# Patient Record
Sex: FEMALE | Race: ASIAN | NOT HISPANIC OR LATINO | Employment: OTHER | URBAN - METROPOLITAN AREA
[De-identification: names, ages, dates, MRNs, and addresses within clinical notes are randomized per-mention and may not be internally consistent; named-entity substitution may affect disease eponyms.]

---

## 2017-08-02 PROBLEM — Q18.1 PREAURICULAR CYST: Status: ACTIVE | Noted: 2017-08-02

## 2017-08-08 RX ORDER — ACETAMINOPHEN 500 MG
1000 TABLET ORAL 3 TIMES DAILY
Status: ON HOLD | COMMUNITY
End: 2022-11-28

## 2017-08-09 ENCOUNTER — ANESTHESIA EVENT (OUTPATIENT)
Dept: SURGERY | Facility: CLINIC | Age: 82
End: 2017-08-09
Payer: MEDICARE

## 2017-08-09 ENCOUNTER — ANESTHESIA (OUTPATIENT)
Dept: SURGERY | Facility: CLINIC | Age: 82
End: 2017-08-09
Payer: MEDICARE

## 2017-08-09 ENCOUNTER — SURGERY (OUTPATIENT)
Age: 82
End: 2017-08-09

## 2017-08-09 ENCOUNTER — HOSPITAL ENCOUNTER (OUTPATIENT)
Facility: CLINIC | Age: 82
Discharge: HOME | End: 2017-08-09
Attending: OTOLARYNGOLOGY | Admitting: OTOLARYNGOLOGY
Payer: MEDICARE

## 2017-08-09 VITALS
HEART RATE: 61 BPM | DIASTOLIC BLOOD PRESSURE: 72 MMHG | OXYGEN SATURATION: 95 % | HEIGHT: 61 IN | SYSTOLIC BLOOD PRESSURE: 143 MMHG | BODY MASS INDEX: 21.88 KG/M2 | RESPIRATION RATE: 16 BRPM | WEIGHT: 115.9 LBS | TEMPERATURE: 96.5 F

## 2017-08-09 DIAGNOSIS — Q18.1 PREAURICULAR CYST: Primary | ICD-10-CM

## 2017-08-09 PROCEDURE — 37000008 ZZH ANESTHESIA TECHNICAL FEE, 1ST 30 MIN: Performed by: OTOLARYNGOLOGY

## 2017-08-09 PROCEDURE — 88305 TISSUE EXAM BY PATHOLOGIST: CPT | Performed by: OTOLARYNGOLOGY

## 2017-08-09 PROCEDURE — 25000128 H RX IP 250 OP 636: Performed by: NURSE ANESTHETIST, CERTIFIED REGISTERED

## 2017-08-09 PROCEDURE — 71000027 ZZH RECOVERY PHASE 2 EACH 15 MINS: Performed by: OTOLARYNGOLOGY

## 2017-08-09 PROCEDURE — 36000052 ZZH SURGERY LEVEL 2 EA 15 ADDTL MIN: Performed by: OTOLARYNGOLOGY

## 2017-08-09 PROCEDURE — 71000012 ZZH RECOVERY PHASE 1 LEVEL 1 FIRST HR: Performed by: OTOLARYNGOLOGY

## 2017-08-09 PROCEDURE — 36000050 ZZH SURGERY LEVEL 2 1ST 30 MIN: Performed by: OTOLARYNGOLOGY

## 2017-08-09 PROCEDURE — 27210794 ZZH OR GENERAL SUPPLY STERILE: Performed by: OTOLARYNGOLOGY

## 2017-08-09 PROCEDURE — 25000125 ZZHC RX 250: Performed by: OTOLARYNGOLOGY

## 2017-08-09 PROCEDURE — 88305 TISSUE EXAM BY PATHOLOGIST: CPT | Mod: 26 | Performed by: OTOLARYNGOLOGY

## 2017-08-09 PROCEDURE — 25000125 ZZHC RX 250: Performed by: NURSE ANESTHETIST, CERTIFIED REGISTERED

## 2017-08-09 PROCEDURE — 37000009 ZZH ANESTHESIA TECHNICAL FEE, EACH ADDTL 15 MIN: Performed by: OTOLARYNGOLOGY

## 2017-08-09 PROCEDURE — 40000170 ZZH STATISTIC PRE-PROCEDURE ASSESSMENT II: Performed by: OTOLARYNGOLOGY

## 2017-08-09 RX ORDER — LIDOCAINE HYDROCHLORIDE 20 MG/ML
INJECTION, SOLUTION INFILTRATION; PERINEURAL PRN
Status: DISCONTINUED | OUTPATIENT
Start: 2017-08-09 | End: 2017-08-09

## 2017-08-09 RX ORDER — FENTANYL CITRATE 50 UG/ML
25-50 INJECTION, SOLUTION INTRAMUSCULAR; INTRAVENOUS EVERY 5 MIN PRN
Status: DISCONTINUED | OUTPATIENT
Start: 2017-08-09 | End: 2017-08-09 | Stop reason: HOSPADM

## 2017-08-09 RX ORDER — IBUPROFEN 600 MG/1
600 TABLET, FILM COATED ORAL
Status: DISCONTINUED | OUTPATIENT
Start: 2017-08-09 | End: 2017-08-09 | Stop reason: HOSPADM

## 2017-08-09 RX ORDER — SODIUM CHLORIDE, SODIUM LACTATE, POTASSIUM CHLORIDE, CALCIUM CHLORIDE 600; 310; 30; 20 MG/100ML; MG/100ML; MG/100ML; MG/100ML
INJECTION, SOLUTION INTRAVENOUS CONTINUOUS
Status: DISCONTINUED | OUTPATIENT
Start: 2017-08-09 | End: 2017-08-09 | Stop reason: HOSPADM

## 2017-08-09 RX ORDER — MEPERIDINE HYDROCHLORIDE 25 MG/ML
12.5 INJECTION INTRAMUSCULAR; INTRAVENOUS; SUBCUTANEOUS
Status: DISCONTINUED | OUTPATIENT
Start: 2017-08-09 | End: 2017-08-09 | Stop reason: HOSPADM

## 2017-08-09 RX ORDER — HYDROCODONE BITARTRATE AND ACETAMINOPHEN 5; 325 MG/1; MG/1
1-2 TABLET ORAL
Status: DISCONTINUED | OUTPATIENT
Start: 2017-08-09 | End: 2017-08-09 | Stop reason: HOSPADM

## 2017-08-09 RX ORDER — ONDANSETRON 4 MG/1
4 TABLET, ORALLY DISINTEGRATING ORAL EVERY 30 MIN PRN
Status: DISCONTINUED | OUTPATIENT
Start: 2017-08-09 | End: 2017-08-09 | Stop reason: HOSPADM

## 2017-08-09 RX ORDER — ONDANSETRON 2 MG/ML
INJECTION INTRAMUSCULAR; INTRAVENOUS PRN
Status: DISCONTINUED | OUTPATIENT
Start: 2017-08-09 | End: 2017-08-09

## 2017-08-09 RX ORDER — FENTANYL CITRATE 50 UG/ML
INJECTION, SOLUTION INTRAMUSCULAR; INTRAVENOUS PRN
Status: DISCONTINUED | OUTPATIENT
Start: 2017-08-09 | End: 2017-08-09

## 2017-08-09 RX ORDER — PROPOFOL 10 MG/ML
INJECTION, EMULSION INTRAVENOUS CONTINUOUS PRN
Status: DISCONTINUED | OUTPATIENT
Start: 2017-08-09 | End: 2017-08-09

## 2017-08-09 RX ORDER — ONDANSETRON 2 MG/ML
4 INJECTION INTRAMUSCULAR; INTRAVENOUS EVERY 30 MIN PRN
Status: DISCONTINUED | OUTPATIENT
Start: 2017-08-09 | End: 2017-08-09 | Stop reason: HOSPADM

## 2017-08-09 RX ORDER — GINSENG 100 MG
CAPSULE ORAL
Status: DISCONTINUED
Start: 2017-08-09 | End: 2017-08-09 | Stop reason: HOSPADM

## 2017-08-09 RX ORDER — DEXAMETHASONE SODIUM PHOSPHATE 4 MG/ML
INJECTION, SOLUTION INTRA-ARTICULAR; INTRALESIONAL; INTRAMUSCULAR; INTRAVENOUS; SOFT TISSUE PRN
Status: DISCONTINUED | OUTPATIENT
Start: 2017-08-09 | End: 2017-08-09

## 2017-08-09 RX ORDER — HYDROCODONE BITARTRATE AND ACETAMINOPHEN 5; 325 MG/1; MG/1
1-2 TABLET ORAL EVERY 4 HOURS PRN
Qty: 20 TABLET | Refills: 0 | Status: ON HOLD | OUTPATIENT
Start: 2017-08-09 | End: 2018-09-02

## 2017-08-09 RX ORDER — LIDOCAINE HYDROCHLORIDE AND EPINEPHRINE 10; 10 MG/ML; UG/ML
INJECTION, SOLUTION INFILTRATION; PERINEURAL PRN
Status: DISCONTINUED | OUTPATIENT
Start: 2017-08-09 | End: 2017-08-09 | Stop reason: HOSPADM

## 2017-08-09 RX ORDER — FENTANYL CITRATE 50 UG/ML
25-50 INJECTION, SOLUTION INTRAMUSCULAR; INTRAVENOUS
Status: DISCONTINUED | OUTPATIENT
Start: 2017-08-09 | End: 2017-08-09 | Stop reason: HOSPADM

## 2017-08-09 RX ORDER — BACITRACIN ZINC 500 [USP'U]/G
OINTMENT TOPICAL PRN
Status: DISCONTINUED | OUTPATIENT
Start: 2017-08-09 | End: 2017-08-09 | Stop reason: HOSPADM

## 2017-08-09 RX ORDER — SODIUM CHLORIDE, SODIUM LACTATE, POTASSIUM CHLORIDE, CALCIUM CHLORIDE 600; 310; 30; 20 MG/100ML; MG/100ML; MG/100ML; MG/100ML
INJECTION, SOLUTION INTRAVENOUS CONTINUOUS PRN
Status: DISCONTINUED | OUTPATIENT
Start: 2017-08-09 | End: 2017-08-09

## 2017-08-09 RX ORDER — CEPHALEXIN 500 MG/1
500 CAPSULE ORAL 2 TIMES DAILY
Qty: 10 CAPSULE | Refills: 0 | Status: SHIPPED | OUTPATIENT
Start: 2017-08-09 | End: 2017-08-14

## 2017-08-09 RX ORDER — NALOXONE HYDROCHLORIDE 0.4 MG/ML
.1-.4 INJECTION, SOLUTION INTRAMUSCULAR; INTRAVENOUS; SUBCUTANEOUS
Status: DISCONTINUED | OUTPATIENT
Start: 2017-08-09 | End: 2017-08-09 | Stop reason: HOSPADM

## 2017-08-09 RX ORDER — LIDOCAINE HYDROCHLORIDE AND EPINEPHRINE 10; 10 MG/ML; UG/ML
INJECTION, SOLUTION INFILTRATION; PERINEURAL
Status: DISCONTINUED
Start: 2017-08-09 | End: 2017-08-09 | Stop reason: HOSPADM

## 2017-08-09 RX ADMIN — FENTANYL CITRATE 25 MCG: 50 INJECTION, SOLUTION INTRAMUSCULAR; INTRAVENOUS at 07:33

## 2017-08-09 RX ADMIN — PROPOFOL 75 MCG/KG/MIN: 10 INJECTION, EMULSION INTRAVENOUS at 07:34

## 2017-08-09 RX ADMIN — BACITRACIN ZINC 1 G: 500 OINTMENT TOPICAL at 08:06

## 2017-08-09 RX ADMIN — DEXAMETHASONE SODIUM PHOSPHATE 4 MG: 4 INJECTION, SOLUTION INTRA-ARTICULAR; INTRALESIONAL; INTRAMUSCULAR; INTRAVENOUS; SOFT TISSUE at 07:35

## 2017-08-09 RX ADMIN — LIDOCAINE HYDROCHLORIDE 25 MG: 20 INJECTION, SOLUTION INFILTRATION; PERINEURAL at 07:42

## 2017-08-09 RX ADMIN — SODIUM CHLORIDE, POTASSIUM CHLORIDE, SODIUM LACTATE AND CALCIUM CHLORIDE: 600; 310; 30; 20 INJECTION, SOLUTION INTRAVENOUS at 07:28

## 2017-08-09 RX ADMIN — FENTANYL CITRATE 25 MCG: 50 INJECTION, SOLUTION INTRAMUSCULAR; INTRAVENOUS at 07:38

## 2017-08-09 RX ADMIN — ONDANSETRON 4 MG: 2 INJECTION INTRAMUSCULAR; INTRAVENOUS at 07:35

## 2017-08-09 RX ADMIN — LIDOCAINE HYDROCHLORIDE AND EPINEPHRINE 5 ML: 10; 10 INJECTION, SOLUTION INFILTRATION; PERINEURAL at 08:00

## 2017-08-09 NOTE — OP NOTE
DATE OF PROCEDURE:  2017       PREOPERATIVE DIAGNOSIS:  Left preauricular cyst.      POSTOPERATIVE DIAGNOSIS:  Left preauricular cyst.       PROCEDURE:  Excision of left preauricular cyst.      PREOPERATIVE HISTORY AND INDICATIONS:  Mejia Vazquez is an 81-year-old female with a long history of mass in the immediate left preauricular region.  This has been drained on several occasions but has recurred.  She presents at this time for its excision.  Risks, benefits and limitations have been discussed with the patient and family.      OPERATIVE PROCEDURE:  The patient was taken to the operating room and was appropriately positioned.  The patient was given intravenous sedation, and at this time the left auricle was prepped in a standard manner.  The patient was then draped and timeout procedure was observed.  The region of the superior left preauricular region was infiltrated with 1% Xylocaine with 1:100,000 epinephrine.  At this time, an elliptical incision was made over the mass present, including puncta that was noted within the skin.  At this time, flaps were elevated and margins of this cystic structure were delineated.  Mild scarring was noted along what I felt to be a probable previous drainage site.  The cyst was mobilized from the soft tissues and eventually tracked to the anterior cartilaginous canal.  A small segment of cartilage and the cyst were removed and hemostasis assured.  The wound was then closed in layers using a 4-0 chromic for the subcutaneous layer and 6-0 fast absorbing plain for the skin.  Bacitracin dressing was applied.  The patient is then transferred to the recovery area in satisfactory condition without complication and with very minimal blood loss.         JOSTIN CARRASCO III, MD             D: 2017 08:24   T: 2017 09:47   MT: KRISTIN#[id number]      Name:     MJEIA VAZQUEZ   MRN:      [id number]        Account:        [id number]   :      1935           Procedure Date: 2017       Document: [id number]       cc: Delmy Starkey III, MD

## 2017-08-09 NOTE — ANESTHESIA POSTPROCEDURE EVALUATION
Patient: Mejia Ordaz    Procedure(s):  EXCISION OF PREAURICULAR CYST LEFT EAR - Wound Class: I-Clean    Diagnosis:LEFT PREAURICULAR CYST  Diagnosis Additional Information: No value filed.    Anesthesia Type:  General, LMA    Note:  Anesthesia Post Evaluation    Patient location during evaluation: PACU  Patient participation: Able to fully participate in evaluation  Level of consciousness: awake  Pain management: adequate  Airway patency: patent  Cardiovascular status: acceptable  Respiratory status: acceptable  Hydration status: acceptable  PONV: controlled     Anesthetic complications: None          Last vitals:  Vitals:    08/09/17 0635 08/09/17 0814 08/09/17 0830   BP: 153/69 136/77 117/70   Pulse: 60 61    Resp: 18 16 22   Temp: 35.9  C (96.7  F) 35.8  C (96.5  F)    SpO2: 97% 96% 93%         Electronically Signed By: Luis Daniel Madden MD  August 9, 2017  8:50 AM

## 2017-08-09 NOTE — ANESTHESIA PREPROCEDURE EVALUATION
Anesthesia Evaluation     . Pt has had prior anesthetic. Type: General    History of anesthetic complications   - PONV        ROS/MED HX    ENT/Pulmonary:       Neurologic: Comment: Vertigo, trigeminal neuralgia      Cardiovascular:     (+) Dyslipidemia, hypertension----. : . . . :. .       METS/Exercise Tolerance:     Hematologic: Comments: thrombcythemia - platelet count 740,000        Musculoskeletal: Comment: Giant cell arteritis        GI/Hepatic:  - neg GI/hepatic ROS       Renal/Genitourinary:     (+) chronic renal disease, type: CRI,       Endo:     (+) Chronic steroid usage for .      Psychiatric:         Infectious Disease:         Malignancy:         Other:                     Physical Exam  Normal systems: cardiovascular, pulmonary and dental    Airway   Mallampati: I  TM distance: >3 FB  Neck ROM: full    Dental     Cardiovascular   Rhythm and rate: regular and normal      Pulmonary    breath sounds clear to auscultation                    Anesthesia Plan      History & Physical Review  History and physical reviewed and following examination; no interval change.    ASA Status:  3 .    NPO Status:  > 8 hours    Plan for MAC with Intravenous induction.   PONV prophylaxis:  Ondansetron (or other 5HT-3) and Dexamethasone or Solumedrol       Postoperative Care  Postoperative pain management:  IV analgesics and Oral pain medications.      Consents  Anesthetic plan, risks, benefits and alternatives discussed with:  Patient..                          .

## 2017-08-09 NOTE — IP AVS SNAPSHOT
MRN:[id number]                      After Visit Summary   8/9/2017    Mejia Ordaz    MRN: [id number]           Thank you!     Thank you for choosing [facility] for your care. Our goal is always to provide you with excellent care. Hearing back from our patients is one way we can continue to improve our services. Please take a few minutes to complete the written survey that you may receive in the mail after you visit with us. Thank you!        Patient Information     Date Of Birth          1935        About your hospital stay     You were admitted on:  August 9, 2017 You last received care in the:  [facility]    You were discharged on:  August 9, 2017       Who to Call     For medical emergencies, please call 911.  For non-urgent questions about your medical care, please call your primary care provider or clinic, [phone]  For questions related to your surgery, please call your surgery clinic        Attending Provider     Provider Yordan Conner MD Otolaryngology       Primary Care Provider Office Phone # Fax #    Delmy Matias [phone]      After Care Instructions     Diet Instructions       Resume pre procedure diet            Discharge Instructions       Patient to follow up with surgeon in 7 days            Discharge Instructions - Lifting restrictions       Lifting Restrictions 15 pounds until seen at Post-op follow up appointment            No driving or operating machinery       until the day after procedure            Wound care       Keep wound dry for three days. Apply Bacitracin three times daily for 5 days.                  Further instructions from your care team       Same Day Surgery Discharge Instructions for  Sedation and General Anesthesia       It's not unusual to feel dizzy, light-headed or faint for up to 24 hours after surgery or while taking pain medication.  If you have these symptoms: sit for a few minutes  before standing and have someone assist you when you get up to walk or use the bathroom.      You should rest and relax for the next 24 hours. We recommend you make arrangements to have an adult stay with you for at least 24 hours after your discharge.  Avoid hazardous and strenuous activity.      DO NOT DRIVE any vehicle or operate mechanical equipment for 24 hours following the end of your surgery.  Even though you may feel normal, your reactions may be affected by the medication you have received.      Do not drink alcoholic beverages for 24 hours following surgery.       Slowly progress to your regular diet as you feel able. It's not unusual to feel nauseated and/or vomit after receiving anesthesia.  If you develop these symptoms, drink clear liquids (apple juice, ginger ale, broth, 7-up, etc. ) until you feel better.  If your nausea and vomiting persists for 24 hours, please notify your surgeon.        All narcotic pain medications, along with inactivity and anesthesia, can cause constipation. Drinking plenty of liquids and increasing fiber intake will help.      For any questions of a medical nature, call your surgeon.      Do not make important decisions for 24 hours.      If you had general anesthesia, you may have a sore throat for a couple of days related to the breathing tube used during surgery.  You may use Cepacol lozenges to help with this discomfort.  If it worsens or if you develop a fever, contact your surgeon.       If you feel your pain is not well managed with the pain medications prescribed by your surgeon, please contact your surgeon's office to let them know so they can address your concerns.     Reasons to contact your surgeon:    1. Signs of possible infection: Check your incision daily for redness, swelling, warmth, red streaks or foul drainage.   2. Elevated temperature.  3. Pain not controlled with pain medication and/or rest.   4. Uncontrolled nausea or vomiting.  Any questions or  "concerns.      **If you have questions or concerns about your procedure, call   Dr. Starkey at [phone]**    Pending Results     Date and Time Order Name Status Description    2017 0000 EKG CARDIAC - HIM SCAN Preliminary             Admission Information     Date & Time Provider Department Dept. Phone    2017 Yordan Starkey MD [facility] [phone]      Your Vitals Were     Blood Pressure Pulse Temperature Respirations Height Weight    117/70 61 96.5  F (35.8  C) (Temporal) 22 1.549 m (5' 1\") 52.6 kg (115 lb 14.4 oz)    Pulse Oximetry BMI (Body Mass Index)                93% 21.9 kg/m2          MyChart Information     [employer] lets you send messages to your doctor, view your test results, renew your prescriptions, schedule appointments and more. To sign up, go to [email or web address] . Click on \"Log in\" on the left side of the screen, which will take you to the Welcome page. Then click on \"Sign up Now\" on the right side of the page.     You will be asked to enter the access code listed below, as well as some personal information. Please follow the directions to create your username and password.     Your access code is: [email or web address]  Expires: 2017  8:26 AM     Your access code will  in 90 days. If you need help or a new code, please call your [place] clinic or [phone].        Care EveryWhere ID     This is your Care EveryWhere ID. This could be used by other organizations to access your [facility] [place] medical records  [id number]        Equal Access to Services     [facility] : Emilio Rasheed, warosa isela buchanan, qator ross. So [facility] [phone].    ATENCIÓN: Si habla español, tiene a arce disposición servicios gratuitos de asistencia lingüística. Llame al [phone].    We comply with applicable federal civil rights laws and [place] laws. We do not discriminate on the basis of race, color, " national origin, age, disability sex, sexual orientation or gender identity.               Review of your medicines      START taking        Dose / Directions    cephALEXin 500 MG capsule   Commonly known as:  KEFLEX   Used for:  Preauricular cyst        Dose:  500 mg   Take 1 capsule (500 mg) by mouth 2 times daily for 5 days   Quantity:  10 capsule   Refills:  0       HYDROcodone-acetaminophen 5-325 MG per tablet   Commonly known as:  NORCO   Used for:  Preauricular cyst        Dose:  1-2 tablet   Take 1-2 tablets by mouth every 4 hours as needed for other (Moderate to Severe Pain)   Quantity:  20 tablet   Refills:  0         CONTINUE these medicines which have NOT CHANGED        Dose / Directions    acetaminophen 500 MG tablet   Commonly known as:  TYLENOL        Dose:  500 mg   Take 500 mg by mouth every 6 hours as needed for mild pain   Refills:  0       atorvastatin 10 MG tablet   Commonly known as:  LIPITOR   Used for:  Other and unspecified hyperlipidemia        Dose:  10 mg   Take 1 tablet (10 mg) by mouth daily   Quantity:  90 tablet   Refills:  0       predniSONE 2 MG EC tablet   Commonly known as:  SADIQ        Dose:  2 mg   Take 2 mg by mouth daily   Refills:  0       triamterene-hydrochlorothiazide 37.5-25 MG per tablet   Commonly known as:  MAXZIDE-25        Dose:  1 tablet   Take 1 tablet by mouth daily   Refills:  0            Where to get your medicines      These medications were sent to [facility] - [address]  [address], [place] [address]     Phone:  [phone]     cephALEXin 500 MG capsule         Some of these will need a paper prescription and others can be bought over the counter. Ask your nurse if you have questions.     Bring a paper prescription for each of these medications     HYDROcodone-acetaminophen 5-325 MG per tablet                Protect others around you: Learn how to safely use, store and throw away your medicines at  www.disposemymeds.org.             Medication List: This is a list of all your medications and when to take them. Check marks below indicate your daily home schedule. Keep this list as a reference.      Medications           Morning Afternoon Evening Bedtime As Needed    acetaminophen 500 MG tablet   Commonly known as:  TYLENOL   Take 500 mg by mouth every 6 hours as needed for mild pain                                atorvastatin 10 MG tablet   Commonly known as:  LIPITOR   Take 1 tablet (10 mg) by mouth daily                                cephALEXin 500 MG capsule   Commonly known as:  KEFLEX   Take 1 capsule (500 mg) by mouth 2 times daily for 5 days                                HYDROcodone-acetaminophen 5-325 MG per tablet   Commonly known as:  NORCO   Take 1-2 tablets by mouth every 4 hours as needed for other (Moderate to Severe Pain)                                predniSONE 2 MG EC tablet   Commonly known as:  SADIQ   Take 2 mg by mouth daily                                triamterene-hydrochlorothiazide 37.5-25 MG per tablet   Commonly known as:  MAXZIDE-25   Take 1 tablet by mouth daily

## 2017-08-09 NOTE — DISCHARGE INSTRUCTIONS
Same Day Surgery Discharge Instructions for  Sedation and General Anesthesia       It's not unusual to feel dizzy, light-headed or faint for up to 24 hours after surgery or while taking pain medication.  If you have these symptoms: sit for a few minutes before standing and have someone assist you when you get up to walk or use the bathroom.      You should rest and relax for the next 24 hours. We recommend you make arrangements to have an adult stay with you for at least 24 hours after your discharge.  Avoid hazardous and strenuous activity.      DO NOT DRIVE any vehicle or operate mechanical equipment for 24 hours following the end of your surgery.  Even though you may feel normal, your reactions may be affected by the medication you have received.      Do not drink alcoholic beverages for 24 hours following surgery.       Slowly progress to your regular diet as you feel able. It's not unusual to feel nauseated and/or vomit after receiving anesthesia.  If you develop these symptoms, drink clear liquids (apple juice, ginger ale, broth, 7-up, etc. ) until you feel better.  If your nausea and vomiting persists for 24 hours, please notify your surgeon.        All narcotic pain medications, along with inactivity and anesthesia, can cause constipation. Drinking plenty of liquids and increasing fiber intake will help.      For any questions of a medical nature, call your surgeon.      Do not make important decisions for 24 hours.      If you had general anesthesia, you may have a sore throat for a couple of days related to the breathing tube used during surgery.  You may use Cepacol lozenges to help with this discomfort.  If it worsens or if you develop a fever, contact your surgeon.       If you feel your pain is not well managed with the pain medications prescribed by your surgeon, please contact your surgeon's office to let them know so they can address your concerns.     Reasons to contact your surgeon:    1. Signs of  possible infection: Check your incision daily for redness, swelling, warmth, red streaks or foul drainage.   2. Elevated temperature.  3. Pain not controlled with pain medication and/or rest.   4. Uncontrolled nausea or vomiting.  Any questions or concerns.      **If you have questions or concerns about your procedure, call   Dr. Starkey at [phone]**

## 2017-08-09 NOTE — BRIEF OP NOTE
[facility] Brief Operative Note    Pre-operative diagnosis: LEFT PREAURICULAR CYST   Post-operative diagnosis Same   Procedure: Procedure(s):  EXCISION OF PREAURICULAR CYST LEFT EAR - Wound Class: I-Clean   Surgeon(s): Surgeon(s) and Role:     * Yordan Starkey MD - Primary   Estimated blood loss: 1 mL    Specimens:   ID Type Source Tests Collected by Time Destination   A : Left Ear Preauricular Cyst Tissue Ear, Outer, Left SURGICAL PATHOLOGY EXAM Yordan Starkey MD 8/9/2017  7:54 AM       Findings: 2.5 cm cyst with attachment to anterior external canal

## 2017-08-09 NOTE — ANESTHESIA CARE TRANSFER NOTE
Patient: Mejia Ordaz    Procedure(s):  EXCISION OF PREAURICULAR CYST LEFT EAR - Wound Class: I-Clean    Diagnosis: LEFT PREAURICULAR CYST  Diagnosis Additional Information: No value filed.    Anesthesia Type:   General, LMA     Note:  Airway :Room Air  Patient transferred to:PACU  Comments: O2 room air. Patient is up in recliner chair, awake, alert and oriented. Sats are 95%. Report to RN.      Vitals: (Last set prior to Anesthesia Care Transfer)    CRNA VITALS  8/9/2017 0743 - 8/9/2017 0816      8/9/2017             Resp Rate (set): 10                Electronically Signed By: KEVIN Katz CRNA  August 9, 2017  8:16 AM

## 2017-08-09 NOTE — IP AVS SNAPSHOT
[facility]    [address]    [place] [address]    Phone:  [phone]    Fax:  [phone]                                       After Visit Summary   8/9/2017    Mejia Ordaz    MRN: [id number]           After Visit Summary Signature Page     I have received my discharge instructions, and my questions have been answered. I have discussed any challenges I see with this plan with the nurse or doctor.    ..........................................................................................................................................  Patient/Patient Representative Signature      ..........................................................................................................................................  Patient Representative Print Name and Relationship to Patient    ..................................................               ................................................  Date                                            Time    ..........................................................................................................................................  Reviewed by Signature/Title    ...................................................              ..............................................  Date                                                            Time

## 2017-08-10 LAB — COPATH REPORT: NORMAL

## 2018-09-01 ENCOUNTER — HOSPITAL ENCOUNTER (OUTPATIENT)
Facility: CLINIC | Age: 83
Setting detail: OBSERVATION
Discharge: HOME | End: 2018-09-02
Attending: EMERGENCY MEDICINE | Admitting: INTERNAL MEDICINE
Payer: MEDICARE

## 2018-09-01 DIAGNOSIS — N39.0 URINARY TRACT INFECTION WITHOUT HEMATURIA, SITE UNSPECIFIED: ICD-10-CM

## 2018-09-01 DIAGNOSIS — R42 VERTIGO: ICD-10-CM

## 2018-09-01 DIAGNOSIS — R55 NEAR SYNCOPE: ICD-10-CM

## 2018-09-01 LAB
ANION GAP SERPL CALCULATED.3IONS-SCNC: 8 MMOL/L (ref 3–14)
BASOPHILS # BLD AUTO: 0 10E9/L (ref 0–0.2)
BASOPHILS NFR BLD AUTO: 0.2 %
BUN SERPL-MCNC: 22 MG/DL (ref 7–30)
CALCIUM SERPL-MCNC: 8.5 MG/DL (ref 8.5–10.1)
CHLORIDE SERPL-SCNC: 101 MMOL/L (ref 94–109)
CO2 SERPL-SCNC: 31 MMOL/L (ref 20–32)
CREAT SERPL-MCNC: 1 MG/DL (ref 0.52–1.04)
DIFFERENTIAL METHOD BLD: ABNORMAL
EOSINOPHIL # BLD AUTO: 0.3 10E9/L (ref 0–0.7)
EOSINOPHIL NFR BLD AUTO: 2.4 %
ERYTHROCYTE [DISTWIDTH] IN BLOOD BY AUTOMATED COUNT: 15.1 % (ref 10–15)
GFR SERPL CREATININE-BSD FRML MDRD: 53 ML/MIN/1.7M2
GLUCOSE SERPL-MCNC: 134 MG/DL (ref 70–99)
HCT VFR BLD AUTO: 41.2 % (ref 35–47)
HGB BLD-MCNC: 13.4 G/DL (ref 11.7–15.7)
IMM GRANULOCYTES # BLD: 0 10E9/L (ref 0–0.4)
IMM GRANULOCYTES NFR BLD: 0.2 %
INTERPRETATION ECG - MUSE: NORMAL
LYMPHOCYTES # BLD AUTO: 2.7 10E9/L (ref 0.8–5.3)
LYMPHOCYTES NFR BLD AUTO: 22.1 %
MAGNESIUM SERPL-MCNC: 2.4 MG/DL (ref 1.6–2.3)
MCH RBC QN AUTO: 27.6 PG (ref 26.5–33)
MCHC RBC AUTO-ENTMCNC: 32.5 G/DL (ref 31.5–36.5)
MCV RBC AUTO: 85 FL (ref 78–100)
MONOCYTES # BLD AUTO: 0.8 10E9/L (ref 0–1.3)
MONOCYTES NFR BLD AUTO: 6.7 %
NEUTROPHILS # BLD AUTO: 8.4 10E9/L (ref 1.6–8.3)
NEUTROPHILS NFR BLD AUTO: 68.4 %
NRBC # BLD AUTO: 0 10*3/UL
NRBC BLD AUTO-RTO: 0 /100
PLATELET # BLD AUTO: 622 10E9/L (ref 150–450)
POTASSIUM SERPL-SCNC: 3.3 MMOL/L (ref 3.4–5.3)
RBC # BLD AUTO: 4.85 10E12/L (ref 3.8–5.2)
SODIUM SERPL-SCNC: 140 MMOL/L (ref 133–144)
WBC # BLD AUTO: 12.3 10E9/L (ref 4–11)

## 2018-09-01 PROCEDURE — 99285 EMERGENCY DEPT VISIT HI MDM: CPT | Mod: 25

## 2018-09-01 PROCEDURE — 87086 URINE CULTURE/COLONY COUNT: CPT | Performed by: EMERGENCY MEDICINE

## 2018-09-01 PROCEDURE — 87088 URINE BACTERIA CULTURE: CPT | Performed by: EMERGENCY MEDICINE

## 2018-09-01 PROCEDURE — 96375 TX/PRO/DX INJ NEW DRUG ADDON: CPT

## 2018-09-01 PROCEDURE — 84443 ASSAY THYROID STIM HORMONE: CPT | Performed by: EMERGENCY MEDICINE

## 2018-09-01 PROCEDURE — 80048 BASIC METABOLIC PNL TOTAL CA: CPT | Performed by: EMERGENCY MEDICINE

## 2018-09-01 PROCEDURE — 85025 COMPLETE CBC W/AUTO DIFF WBC: CPT | Performed by: EMERGENCY MEDICINE

## 2018-09-01 PROCEDURE — 83735 ASSAY OF MAGNESIUM: CPT | Performed by: EMERGENCY MEDICINE

## 2018-09-01 PROCEDURE — A9270 NON-COVERED ITEM OR SERVICE: HCPCS | Mod: GY | Performed by: EMERGENCY MEDICINE

## 2018-09-01 PROCEDURE — 25000128 H RX IP 250 OP 636: Performed by: EMERGENCY MEDICINE

## 2018-09-01 PROCEDURE — 87186 SC STD MICRODIL/AGAR DIL: CPT | Performed by: EMERGENCY MEDICINE

## 2018-09-01 PROCEDURE — 25000131 ZZH RX MED GY IP 250 OP 636 PS 637: Mod: GY | Performed by: EMERGENCY MEDICINE

## 2018-09-01 PROCEDURE — 81001 URINALYSIS AUTO W/SCOPE: CPT | Performed by: EMERGENCY MEDICINE

## 2018-09-01 PROCEDURE — 93005 ELECTROCARDIOGRAM TRACING: CPT

## 2018-09-01 PROCEDURE — 84484 ASSAY OF TROPONIN QUANT: CPT | Performed by: EMERGENCY MEDICINE

## 2018-09-01 RX ORDER — ONDANSETRON 2 MG/ML
4 INJECTION INTRAMUSCULAR; INTRAVENOUS ONCE
Status: COMPLETED | OUTPATIENT
Start: 2018-09-01 | End: 2018-09-01

## 2018-09-01 RX ORDER — MECLIZINE HYDROCHLORIDE 25 MG/1
25 TABLET ORAL ONCE
Status: COMPLETED | OUTPATIENT
Start: 2018-09-01 | End: 2018-09-01

## 2018-09-01 RX ADMIN — MECLIZINE HYDROCHLORIDE 25 MG: 25 TABLET ORAL at 23:54

## 2018-09-01 RX ADMIN — ONDANSETRON 4 MG: 2 INJECTION INTRAMUSCULAR; INTRAVENOUS at 23:54

## 2018-09-01 ASSESSMENT — ENCOUNTER SYMPTOMS
DIARRHEA: 0
HEMATURIA: 0
NAUSEA: 1
DYSURIA: 0
SHORTNESS OF BREATH: 0
WEAKNESS: 1
DIFFICULTY URINATING: 0
DIZZINESS: 1
HEADACHES: 0
BLOOD IN STOOL: 0
ABDOMINAL PAIN: 0
VOMITING: 1

## 2018-09-01 NOTE — IP AVS SNAPSHOT
MRN:[id number]                      After Visit Summary   9/1/2018    Mejia Ordaz    MRN: [id number]           Thank you!     Thank you for choosing [facility] for your care. Our goal is always to provide you with excellent care. Hearing back from our patients is one way we can continue to improve our services. Please take a few minutes to complete the written survey that you may receive in the mail after you visit with us. Thank you!        Patient Information     Date Of Birth          1935        About your hospital stay     You were admitted on:  September 2, 2018 [facility]    You were discharged on:  September 2, 2018        Reason for your hospital stay       You were in the hospital to treat your vertigo                  Who to Call     For medical emergencies, please call 911.  For non-urgent questions about your medical care, please call your primary care provider or clinic, [phone]          Attending Provider     Provider Specialty    Chaparro Fountain MD Emergency Medicine    Aguilar, Miguel Bush MD Internal Medicine       Primary Care Provider Office Phone # Fax #    Delmy Matias [phone]      After Care Instructions     Activity       Your activity upon discharge: activity as tolerated            Diet       Follow this diet upon discharge: Regular                  Follow-up Appointments     Follow-up and recommended labs and tests        Follow up with primary care provider, Delmy Matias MD, within 7-10 days for hospital follow- up.                  Pending Results     Date and Time Order Name Status Description    9/2/2018 1302 Basic metabolic panel In process     9/2/2018 0055 Urine Culture Preliminary             Statement of Approval     Ordered          09/02/18 1252  I have reviewed and agree with all the recommendations and orders detailed in this document.  EFFECTIVE NOW     Approved and  "electronically signed by:  Valeria Jaffe PA-C             Admission Information     Date & Time Provider Department Dept. Phone    2018 Aguilar, Miguel Bush MD [facility] Observation Unit [phone]      Your Vitals Were     Blood Pressure Pulse Temperature Respirations Height Weight    150/63 (BP Location: Right arm) 56 96.5  F (35.8  C) (Oral) 16 1.575 m (5' 2\") 53.2 kg (117 lb 3.2 oz)    Pulse Oximetry BMI (Body Mass Index)                94% 21.44 kg/m2          [email or web address] Information     [employer] lets you send messages to your doctor, view your test results, renew your prescriptions, schedule appointments and more. To sign up, go to [email or web address] ([employer]) . Click on \"Log in\" on the left side of the screen, which will take you to the Welcome page. Then click on \"Sign up Now\" on the right side of the page.     You will be asked to enter the access code listed below, as well as some personal information. Please follow the directions to create your username and password.     Your access code is: [email or web address]  Expires: 2018  1:34 AM     Your access code will  in 90 days. If you need help or a new code, please call your [facility] clinic or [phone].        Care EveryWhere ID     This is your Care EveryWhere ID. This could be used by other organizations to access your [facility] medical records  [id number]        Equal Access to Services     [facility] AH: Hadii alison ku hadasho Soomaali, waaxda luqadaha, qaybta kaalmada adeegyada, tor thomas . So [facility] [phone].    ATENCIÓN: Si habla español, tiene a arce disposición servicios gratuitos de asistencia lingüística. Llame al [phone].    We comply with applicable federal civil rights laws and [place] laws. We do not discriminate on the basis of race, color, national origin, age, disability, sex, sexual orientation, or gender identity.               Review of your medicines      CONTINUE these medicines which " have NOT CHANGED        Dose / Directions    acetaminophen 500 MG tablet   Commonly known as:  TYLENOL        Dose:  500 mg   Take 500 mg by mouth every 6 hours as needed for mild pain   Refills:  0       atorvastatin 10 MG tablet   Commonly known as:  LIPITOR   Used for:  Other and unspecified hyperlipidemia        Dose:  10 mg   Take 1 tablet (10 mg) by mouth daily   Quantity:  90 tablet   Refills:  0       predniSONE 1 MG tablet   Commonly known as:  DELTASONE        Dose:  1 mg   Take 1 mg by mouth daily   Refills:  0       triamterene-hydrochlorothiazide 37.5-25 MG per tablet   Commonly known as:  MAXZIDE-25        Dose:  1 tablet   Take 1 tablet by mouth daily   Refills:  0                Protect others around you: Learn how to safely use, store and throw away your medicines at www.disposemymeds.org.             Medication List: This is a list of all your medications and when to take them. Check marks below indicate your daily home schedule. Keep this list as a reference.      Medications           Morning Afternoon Evening Bedtime As Needed    acetaminophen 500 MG tablet   Commonly known as:  TYLENOL   Take 500 mg by mouth every 6 hours as needed for mild pain                                atorvastatin 10 MG tablet   Commonly known as:  LIPITOR   Take 1 tablet (10 mg) by mouth daily                                predniSONE 1 MG tablet   Commonly known as:  DELTASONE   Take 1 mg by mouth daily   Last time this was given:  2 mg on 9/2/2018  8:04 AM                                triamterene-hydrochlorothiazide 37.5-25 MG per tablet   Commonly known as:  MAXZIDE-25   Take 1 tablet by mouth daily

## 2018-09-02 VITALS
OXYGEN SATURATION: 94 % | WEIGHT: 117.2 LBS | SYSTOLIC BLOOD PRESSURE: 150 MMHG | TEMPERATURE: 96.5 F | DIASTOLIC BLOOD PRESSURE: 63 MMHG | HEART RATE: 56 BPM | HEIGHT: 62 IN | RESPIRATION RATE: 16 BRPM | BODY MASS INDEX: 21.57 KG/M2

## 2018-09-02 PROBLEM — R42 VERTIGO: Status: ACTIVE | Noted: 2018-09-02

## 2018-09-02 LAB
ALBUMIN UR-MCNC: NEGATIVE MG/DL
ANION GAP SERPL CALCULATED.3IONS-SCNC: 8 MMOL/L (ref 3–14)
APPEARANCE UR: CLEAR
BILIRUB UR QL STRIP: NEGATIVE
BUN SERPL-MCNC: 19 MG/DL (ref 7–30)
CALCIUM SERPL-MCNC: 8.2 MG/DL (ref 8.5–10.1)
CHLORIDE SERPL-SCNC: 105 MMOL/L (ref 94–109)
CO2 SERPL-SCNC: 28 MMOL/L (ref 20–32)
COLOR UR AUTO: ABNORMAL
CREAT SERPL-MCNC: 1.01 MG/DL (ref 0.52–1.04)
ERYTHROCYTE [DISTWIDTH] IN BLOOD BY AUTOMATED COUNT: 15.1 % (ref 10–15)
GFR SERPL CREATININE-BSD FRML MDRD: 52 ML/MIN/1.7M2
GLUCOSE SERPL-MCNC: 165 MG/DL (ref 70–99)
GLUCOSE UR STRIP-MCNC: NEGATIVE MG/DL
HCT VFR BLD AUTO: 37 % (ref 35–47)
HGB BLD-MCNC: 12.1 G/DL (ref 11.7–15.7)
HGB UR QL STRIP: NEGATIVE
KETONES UR STRIP-MCNC: NEGATIVE MG/DL
LEUKOCYTE ESTERASE UR QL STRIP: ABNORMAL
MCH RBC QN AUTO: 27.7 PG (ref 26.5–33)
MCHC RBC AUTO-ENTMCNC: 32.7 G/DL (ref 31.5–36.5)
MCV RBC AUTO: 85 FL (ref 78–100)
NITRATE UR QL: POSITIVE
PH UR STRIP: 6.5 PH (ref 5–7)
PLATELET # BLD AUTO: 494 10E9/L (ref 150–450)
POTASSIUM SERPL-SCNC: 4.3 MMOL/L (ref 3.4–5.3)
RBC # BLD AUTO: 4.37 10E12/L (ref 3.8–5.2)
RBC #/AREA URNS AUTO: <1 /HPF (ref 0–2)
SODIUM SERPL-SCNC: 141 MMOL/L (ref 133–144)
SOURCE: ABNORMAL
SP GR UR STRIP: 1.01 (ref 1–1.03)
TROPONIN I SERPL-MCNC: <0.015 UG/L (ref 0–0.04)
TSH SERPL DL<=0.005 MIU/L-ACNC: 1.25 MU/L (ref 0.4–4)
UROBILINOGEN UR STRIP-MCNC: NORMAL MG/DL (ref 0–2)
WBC # BLD AUTO: 10.2 10E9/L (ref 4–11)
WBC #/AREA URNS AUTO: 23 /HPF (ref 0–5)

## 2018-09-02 PROCEDURE — 25000132 ZZH RX MED GY IP 250 OP 250 PS 637: Mod: GY | Performed by: INTERNAL MEDICINE

## 2018-09-02 PROCEDURE — 85027 COMPLETE CBC AUTOMATED: CPT | Performed by: PHYSICIAN ASSISTANT

## 2018-09-02 PROCEDURE — 25000128 H RX IP 250 OP 636: Performed by: INTERNAL MEDICINE

## 2018-09-02 PROCEDURE — 99207 ZZC APP CREDIT; MD BILLING SHARED VISIT: CPT | Performed by: PHYSICIAN ASSISTANT

## 2018-09-02 PROCEDURE — G0378 HOSPITAL OBSERVATION PER HR: HCPCS

## 2018-09-02 PROCEDURE — 25000125 ZZHC RX 250: Performed by: INTERNAL MEDICINE

## 2018-09-02 PROCEDURE — 96361 HYDRATE IV INFUSION ADD-ON: CPT

## 2018-09-02 PROCEDURE — 99207 ZZC MOONLIGHTING INDICATOR: CPT | Performed by: PHYSICIAN ASSISTANT

## 2018-09-02 PROCEDURE — 36415 COLL VENOUS BLD VENIPUNCTURE: CPT | Performed by: PHYSICIAN ASSISTANT

## 2018-09-02 PROCEDURE — A9270 NON-COVERED ITEM OR SERVICE: HCPCS | Mod: GY | Performed by: EMERGENCY MEDICINE

## 2018-09-02 PROCEDURE — 25000132 ZZH RX MED GY IP 250 OP 250 PS 637: Mod: GY | Performed by: EMERGENCY MEDICINE

## 2018-09-02 PROCEDURE — 25000128 H RX IP 250 OP 636: Performed by: EMERGENCY MEDICINE

## 2018-09-02 PROCEDURE — A9270 NON-COVERED ITEM OR SERVICE: HCPCS | Mod: GY | Performed by: INTERNAL MEDICINE

## 2018-09-02 PROCEDURE — 96365 THER/PROPH/DIAG IV INF INIT: CPT

## 2018-09-02 PROCEDURE — 80048 BASIC METABOLIC PNL TOTAL CA: CPT | Performed by: PHYSICIAN ASSISTANT

## 2018-09-02 PROCEDURE — 99236 HOSP IP/OBS SAME DATE HI 85: CPT | Performed by: INTERNAL MEDICINE

## 2018-09-02 RX ORDER — PREDNISONE 1 MG/1
1 TABLET ORAL DAILY
COMMUNITY
End: 2022-06-04

## 2018-09-02 RX ORDER — POTASSIUM CHLORIDE 7.45 MG/ML
10 INJECTION INTRAVENOUS
Status: DISCONTINUED | OUTPATIENT
Start: 2018-09-02 | End: 2018-09-02 | Stop reason: HOSPADM

## 2018-09-02 RX ORDER — ACETAMINOPHEN 325 MG/1
650 TABLET ORAL EVERY 4 HOURS PRN
Status: DISCONTINUED | OUTPATIENT
Start: 2018-09-02 | End: 2018-09-02 | Stop reason: HOSPADM

## 2018-09-02 RX ORDER — METOCLOPRAMIDE HYDROCHLORIDE 5 MG/ML
5 INJECTION INTRAMUSCULAR; INTRAVENOUS EVERY 6 HOURS PRN
Status: DISCONTINUED | OUTPATIENT
Start: 2018-09-02 | End: 2018-09-02 | Stop reason: HOSPADM

## 2018-09-02 RX ORDER — AMOXICILLIN 250 MG
2 CAPSULE ORAL 2 TIMES DAILY PRN
Status: DISCONTINUED | OUTPATIENT
Start: 2018-09-02 | End: 2018-09-02 | Stop reason: HOSPADM

## 2018-09-02 RX ORDER — PROCHLORPERAZINE 25 MG
12.5 SUPPOSITORY, RECTAL RECTAL EVERY 12 HOURS PRN
Status: DISCONTINUED | OUTPATIENT
Start: 2018-09-02 | End: 2018-09-02 | Stop reason: HOSPADM

## 2018-09-02 RX ORDER — CEFTRIAXONE 1 G/1
1 INJECTION, POWDER, FOR SOLUTION INTRAMUSCULAR; INTRAVENOUS ONCE
Status: COMPLETED | OUTPATIENT
Start: 2018-09-02 | End: 2018-09-02

## 2018-09-02 RX ORDER — ONDANSETRON 2 MG/ML
4 INJECTION INTRAMUSCULAR; INTRAVENOUS EVERY 6 HOURS PRN
Status: DISCONTINUED | OUTPATIENT
Start: 2018-09-02 | End: 2018-09-02 | Stop reason: HOSPADM

## 2018-09-02 RX ORDER — POLYETHYLENE GLYCOL 3350 17 G/17G
17 POWDER, FOR SOLUTION ORAL DAILY PRN
Status: DISCONTINUED | OUTPATIENT
Start: 2018-09-02 | End: 2018-09-02 | Stop reason: HOSPADM

## 2018-09-02 RX ORDER — PREDNISONE 1 MG/1
2 TABLET ORAL DAILY
Status: DISCONTINUED | OUTPATIENT
Start: 2018-09-02 | End: 2018-09-02 | Stop reason: HOSPADM

## 2018-09-02 RX ORDER — ACETAMINOPHEN 500 MG
500 TABLET ORAL EVERY 6 HOURS PRN
Status: DISCONTINUED | OUTPATIENT
Start: 2018-09-02 | End: 2018-09-02 | Stop reason: HOSPADM

## 2018-09-02 RX ORDER — MECLIZINE HYDROCHLORIDE 25 MG/1
25 TABLET ORAL 3 TIMES DAILY PRN
Status: DISCONTINUED | OUTPATIENT
Start: 2018-09-02 | End: 2018-09-02 | Stop reason: HOSPADM

## 2018-09-02 RX ORDER — NALOXONE HYDROCHLORIDE 0.4 MG/ML
.1-.4 INJECTION, SOLUTION INTRAMUSCULAR; INTRAVENOUS; SUBCUTANEOUS
Status: DISCONTINUED | OUTPATIENT
Start: 2018-09-02 | End: 2018-09-02 | Stop reason: HOSPADM

## 2018-09-02 RX ORDER — PROCHLORPERAZINE MALEATE 5 MG
5 TABLET ORAL EVERY 6 HOURS PRN
Status: DISCONTINUED | OUTPATIENT
Start: 2018-09-02 | End: 2018-09-02 | Stop reason: HOSPADM

## 2018-09-02 RX ORDER — POTASSIUM CHLORIDE 1500 MG/1
40 TABLET, EXTENDED RELEASE ORAL ONCE
Status: COMPLETED | OUTPATIENT
Start: 2018-09-02 | End: 2018-09-02

## 2018-09-02 RX ORDER — LIDOCAINE 40 MG/G
CREAM TOPICAL
Status: DISCONTINUED | OUTPATIENT
Start: 2018-09-02 | End: 2018-09-02 | Stop reason: HOSPADM

## 2018-09-02 RX ORDER — BISACODYL 10 MG
10 SUPPOSITORY, RECTAL RECTAL DAILY PRN
Status: DISCONTINUED | OUTPATIENT
Start: 2018-09-02 | End: 2018-09-02 | Stop reason: HOSPADM

## 2018-09-02 RX ORDER — POTASSIUM CHLORIDE 1500 MG/1
20-40 TABLET, EXTENDED RELEASE ORAL
Status: DISCONTINUED | OUTPATIENT
Start: 2018-09-02 | End: 2018-09-02 | Stop reason: HOSPADM

## 2018-09-02 RX ORDER — POTASSIUM CHLORIDE 1.5 G/1.58G
20-40 POWDER, FOR SOLUTION ORAL
Status: DISCONTINUED | OUTPATIENT
Start: 2018-09-02 | End: 2018-09-02 | Stop reason: HOSPADM

## 2018-09-02 RX ORDER — ONDANSETRON 4 MG/1
4 TABLET, ORALLY DISINTEGRATING ORAL EVERY 6 HOURS PRN
Status: DISCONTINUED | OUTPATIENT
Start: 2018-09-02 | End: 2018-09-02 | Stop reason: HOSPADM

## 2018-09-02 RX ORDER — METOCLOPRAMIDE 5 MG/1
5 TABLET ORAL EVERY 6 HOURS PRN
Status: DISCONTINUED | OUTPATIENT
Start: 2018-09-02 | End: 2018-09-02 | Stop reason: HOSPADM

## 2018-09-02 RX ORDER — AMOXICILLIN 250 MG
1 CAPSULE ORAL 2 TIMES DAILY PRN
Status: DISCONTINUED | OUTPATIENT
Start: 2018-09-02 | End: 2018-09-02 | Stop reason: HOSPADM

## 2018-09-02 RX ORDER — CEFTRIAXONE 1 G/1
1 INJECTION, POWDER, FOR SOLUTION INTRAMUSCULAR; INTRAVENOUS EVERY 24 HOURS
Status: DISCONTINUED | OUTPATIENT
Start: 2018-09-03 | End: 2018-09-02 | Stop reason: HOSPADM

## 2018-09-02 RX ORDER — MAGNESIUM SULFATE HEPTAHYDRATE 40 MG/ML
4 INJECTION, SOLUTION INTRAVENOUS EVERY 4 HOURS PRN
Status: DISCONTINUED | OUTPATIENT
Start: 2018-09-02 | End: 2018-09-02 | Stop reason: HOSPADM

## 2018-09-02 RX ORDER — POTASSIUM CHLORIDE 29.8 MG/ML
20 INJECTION INTRAVENOUS
Status: DISCONTINUED | OUTPATIENT
Start: 2018-09-02 | End: 2018-09-02 | Stop reason: HOSPADM

## 2018-09-02 RX ORDER — POTASSIUM CL/LIDO/0.9 % NACL 10MEQ/0.1L
10 INTRAVENOUS SOLUTION, PIGGYBACK (ML) INTRAVENOUS
Status: DISCONTINUED | OUTPATIENT
Start: 2018-09-02 | End: 2018-09-02 | Stop reason: HOSPADM

## 2018-09-02 RX ORDER — ACETAMINOPHEN 650 MG/1
650 SUPPOSITORY RECTAL EVERY 4 HOURS PRN
Status: DISCONTINUED | OUTPATIENT
Start: 2018-09-02 | End: 2018-09-02 | Stop reason: HOSPADM

## 2018-09-02 RX ADMIN — CEFTRIAXONE SODIUM 1 G: 1 INJECTION, POWDER, FOR SOLUTION INTRAMUSCULAR; INTRAVENOUS at 01:11

## 2018-09-02 RX ADMIN — PREDNISONE 2 MG: 1 TABLET ORAL at 08:04

## 2018-09-02 RX ADMIN — SODIUM CHLORIDE 500 ML: 9 INJECTION, SOLUTION INTRAVENOUS at 00:29

## 2018-09-02 RX ADMIN — SODIUM CHLORIDE, POTASSIUM CHLORIDE, SODIUM LACTATE AND CALCIUM CHLORIDE 1000 ML: 600; 310; 30; 20 INJECTION, SOLUTION INTRAVENOUS at 02:43

## 2018-09-02 RX ADMIN — POTASSIUM CHLORIDE 20 MEQ: 1500 TABLET, EXTENDED RELEASE ORAL at 03:07

## 2018-09-02 RX ADMIN — POTASSIUM CHLORIDE 40 MEQ: 1500 TABLET, EXTENDED RELEASE ORAL at 01:11

## 2018-09-02 NOTE — PLAN OF CARE
Problem: Patient Care Overview  Goal: Plan of Care/Patient Progress Review   Observation goals PRIOR TO DISCHARGE     diagnostic tests and consults completed and resulted -Not met  -vital signs normal or at patient baseline -met  -tolerating oral intake to maintain hydration -met  -returns to baseline functional status-not met        A&OX4, VSS, Denies any pain,SOB nor dizziness.Up with standby assist .Has a little bit of language barrier, declined in  service at the ED, daughter was interpreting . Patient can understand and respond to simple short sentences.Tele-SR.I.V L/Ivetg @ 100.K+ replaced over the night, for recheck this am.

## 2018-09-02 NOTE — ED NOTES
Reassessed patient at this time. Patient states that she is still feeling dizzy and nauseated after medication.

## 2018-09-02 NOTE — PROGRESS NOTES
"Pt ambulated around unit. Reported \"a little bit of dizziness but not too much.\" Steady gait. Ortho BPs neg     Layin/51/ HR: 60  Sittin/55 HR: 66  Standin/63 HR: 58  "

## 2018-09-02 NOTE — ED NOTES
Patient assisted to bathroom at this time. Used wheelchair and 1 person assist. UA obtained. Sent to lab. Pt also had bowel movement.

## 2018-09-02 NOTE — PHARMACY-ADMISSION MEDICATION HISTORY
Admission medication history interview status for the 9/1/2018  admission is complete. See EPIC admission navigator for prior to admission medications     Medication history source reliability:Good    Actions taken by pharmacist (provider contacted, etc):None     Additional medication history information not noted on PTA med list :None    Medication reconciliation/reorder completed by provider prior to medication history? Yes\    Time spent in this activity: 10    Prior to Admission medications    Medication Sig Last Dose Taking? Auth Provider   acetaminophen (TYLENOL) 500 MG tablet Take 500 mg by mouth every 6 hours as needed for mild pain PRN at PRN Yes Reported, Patient   atorvastatin (LIPITOR) 10 MG tablet Take 1 tablet (10 mg) by mouth daily 9/1/2018 at Unknown time Yes Jeet Ortiz MD   predniSONE (DELTASONE) 1 MG tablet Take 1 mg by mouth daily 9/1/2018 at Unknown time Yes Unknown, Entered By History   triamterene-hydrochlorothiazide (MAXZIDE-25) 37.5-25 MG per tablet Take 1 tablet by mouth daily 9/1/2018 at Unknown time Yes Reported, Patient

## 2018-09-02 NOTE — PLAN OF CARE
Problem: Patient Care Overview  Goal: Plan of Care/Patient Progress Review  Outcome: Adequate for Discharge Date Met: 09/02/18  A&Ox4. VSS on RA. K+: 4.3. Denies dizziness, pain, and SOB. Pt given discharge instructions. Questions answered. Pt to resume pre-admission meds, pt aware. Pt to schedule follow up appt w/ PCP Dr. Chirinos, pt aware. Pt to DC home with daughterMargie.

## 2018-09-02 NOTE — ED NOTES
"[facility]  ED Nurse Handoff Report    ED Chief complaint: Loss of Consciousness (Pt. was sitting at table tonight and \"passed out\" daughter then describes pt became \"very sweaty\" and now is nauseated)      ED Diagnosis:   Final diagnoses:   Urinary tract infection without hematuria, site unspecified   Vertigo   Near syncope       Code Status: Full Code    Allergies:   Allergies   Allergen Reactions     Penicillins Itching     Scopolamine Nausea     Nausea for two weeks after patch was removed.       Activity level - Baseline/Home:  Independent    Activity Level - Current:   Stand with Assist     Needed?: Yes    Isolation: No  Infection: Not Applicable  Bariatric?: No    Vital Signs:   Vitals:    09/01/18 2331 09/02/18 0000 09/02/18 0014 09/02/18 0030   BP: 155/76 144/72  146/68   Pulse:       Resp: 12 14 10 13   Temp:       TempSrc:       SpO2: 98% 97% 99% 98%   Weight:       Height:           Cardiac Rhythm: ,        Pain level:      Is this patient confused?: No   [place] - Suicide Severity Rating Scale Completed?  Yes  If yes, what color did the patient score?  White    Patient Report: Initial Complaint: Patient was sitting at table tonight with friends and started feeling dizzy. States she felt like she was going to pass out. Has had similar episode in past, but not as bad. Pt speaks some English.   Focused Assessment: c/o dizziness and nausea while in ED.  Tests Performed: Lab work, UA  Abnormal Results: +UTI  Treatments provided: 1L NS, 4mg Zofran, 25mg Meclizine.     Family Comments: daughter is at bedside, is interpreting.     OBS brochure/video discussed/provided to patient: Yes    ED Medications:   Medications   cefTRIAXone (ROCEPHIN) 1 g vial to attach to  mL bag for ADULTS or NS 50 mL bag for PEDS (not administered)   potassium chloride SA (K-DUR/KLOR-CON M) CR tablet 40 mEq (not administered)   ondansetron (ZOFRAN) injection 4 mg (4 mg Intravenous Given 9/1/18 6712) "   meclizine (ANTIVERT) tablet 25 mg (25 mg Oral Given 9/1/18 6852)   0.9% sodium chloride BOLUS (500 mLs Intravenous New Bag 9/2/18 0029)       Drips infusing?:  Yes    For the majority of the shift this patient was Green.   Interventions performed were Monitor, repositioned, updated on cares.    Severe Sepsis OR Septic Shock Diagnosis Present: No      ED NURSE PHONE NUMBER: *[phone]

## 2018-09-02 NOTE — PROGRESS NOTES
Pt reports that daughter, Margie, was planning on coming at noon and is her ride home. Called Margie, no answer, left a message that pt is ok to discharge today and wondering if she was still planning to be here/what time. Left name and callback number

## 2018-09-02 NOTE — PROGRESS NOTES
"OBSERVATION GOALS    -diagnostic tests and consults completed and resulted: not met  -vital signs normal or at patient baseline: met  -tolerating oral intake to maintain hydration: met  -returns to baseline functional status: partially met, pt \"feels less dizzy and a lot better than yesterday. Has not been out of bed this AM, plan to get up and attempt walk when finished w/ breakfast  "

## 2018-09-02 NOTE — DISCHARGE SUMMARY
[facility]  Discharge Summary  Hospitalist    Date of Admission:  9/1/2018  Date of Discharge:  9/2/2018  Discharging Provider: Valeria Jaffe PA-C    Discharge Diagnoses   Vertigo and near syncope- resolved    Hospital Course   Mejia Ordaz was admitted on 9/1/2018.  The following problems were addressed during her hospitalization: Pt presented with sudden onset leftward spinning vertiginous symptoms while playing [employer] with her friends. Patient has history of vertigo as well as a history of presyncope and falls associated with vertiginous spells dating back at least 10 years.  Daughter describes patient with an episode of vertigo after returning from a cruise which required patient to be on bedrest for several days with hospitalization in [place], [place]. Pt was treated symptomatically and symptoms resolved overnight. Pt was able to ambulate independently with minimal dizziness and no nausea on 9/2. Pt tolerated a regular lunch without nausea or vomiting. Pt was deemed medically ready to go home 9/2 and pt agreed, so she was discharge from observation 9/2/18.     Active Problems:    Vertigo    Vertigo: Patient with recurrent episodes of vertigo.  History of presyncopal events associated with this in the past as well including 2014, 2016 through outside system.  No clear movement precipitating this episode, though patient describes leftward rotation with vertigo.  -As needed antiemetics including Zofran, Compazine, Reglan as well as Meclizine 25 mg 3 times daily were made available as needed for vertiginous symptoms. Pt used 9/1, but did not require 9/2. Offered pt Rx for anti-emetics at discharge, but she declined  -Regular diet was tolerated without nausea 9/2  -Pt ambulated with nursing staff to assess stability and walk around unit was completed with minimal difficulties and pt feels much better today  -Prior to admission hydrochlorothiazide held during observation stay- resumed at  discharge as BP was 150/63 today and SBP was 128-155mmHg during observation unit stay. Will have pt follow-up with PCP to adjust PRN     Presyncope: Suspect vagal response.  Patient describes onset of vertigo followed by nausea, then cold sweats, tunnel vision.  Baseline heart rate appears to be in the 60s.  Similar episodes in 2014, 2016 for which she was evaluated through her primary care system.  -Cardiac telemetry was benign  -As this is a recurrent issue for patient in the setting of vertigo, defer repeat TTE to primary care provider.  Appears to have had a normal TTE as of 2006.     Abnormal urinalysis: Possibly represents urinary tract infection, though largely asymptomatic from a  standpoint.  Patient with a known history of vertigo, so seems less likely that vertigo was precipitated by UTI  -Urine culture pending, will follow-up when it's back  -Was on ceftriaxone pending urine culture results. Anti-infectives discontinued at discharge as pt asymptomatic and WBCs were WNL at discharge     Hypokalemia:  -Prior to admission hydrochlorothiazide held, resume at discharge  -Potassium, magnesium, phosphorus replacement protocols were in place. Potassium was WNL prior to discharge     Giant cell arteritis: Followed by arthritis and rheumatology clinic.  -Continue low-dose prednisone.  Appears to be on 1 mg versus 2 mg daily oral prednisone by review of outside records.  Ordered 2 mg every morning while in observation.     Chronic medical issues:  Patient with a history of trigeminal neuralgia now status post occipital craniectomy for decompression.    Valeria Jaffe PA-C    Pending Results   Urine culture, will call if organisms grows    Code Status   Full Code       Primary Care Physician   Delmy Matias MD    Physical Exam   Temp: 96.5  F (35.8  C) Temp src: Oral BP: 150/63 (standing) Pulse: 56 Heart Rate: 58 Resp: 16 SpO2: 94 % O2 Device: None (Room air)    Vitals:    09/01/18 2315 09/02/18 0219    Weight: 53.1 kg (117 lb) 53.2 kg (117 lb 3.2 oz)     Vital Signs with Ranges  Temp:  [95.6  F (35.3  C)-97  F (36.1  C)] 96.5  F (35.8  C)  Pulse:  [56] 56  Heart Rate:  [54-66] 58  Resp:  [10-20] 16  BP: (128-155)/(51-79) 150/63  SpO2:  [93 %-100 %] 94 %     Constitutional: alert, NAD, cooperative  Respiratory: breathing unlabored, no supplemental oxygenation required  Cardiovascular: RRR  Eyes: no scleral icterus or injection.  No nystagmus  HEENT: moist mucous membranes  GI: abdomen soft, NTTP, non-distended  Lymph/Hematologic: no lower extremity swelling  Genitourinary: not examined  Skin: no rashes  Musculoskeletal: muscular tone intact in all extremities  Neurologic: mental status grossly intact, left facial droop in the setting of known Bell's palsy, alert  Psychiatric: normal affect    Discharge Disposition   Discharged to home  Condition at discharge: Stable    Consultations This Hospital Stay   CARE COORDINATOR IP CONSULT    Time Spent on this Encounter   IValeria, personally saw the patient today and spent less than or equal to 30 minutes discharging this patient.    Discharge Orders     Reason for your hospital stay   You were in the hospital to treat your vertigo     Follow-up and recommended labs and tests    Follow up with primary care provider, Delmy Matias MD, within 7-10 days for hospital follow- up.     Activity   Your activity upon discharge: activity as tolerated     Diet   Follow this diet upon discharge: Regular       Discharge Medications   Current Discharge Medication List      CONTINUE these medications which have NOT CHANGED    Details   acetaminophen (TYLENOL) 500 MG tablet Take 500 mg by mouth every 6 hours as needed for mild pain      atorvastatin (LIPITOR) 10 MG tablet Take 1 tablet (10 mg) by mouth daily  Qty: 90 tablet, Refills: 0    Associated Diagnoses: Other and unspecified hyperlipidemia      predniSONE (DELTASONE) 1 MG tablet Take 1 mg by mouth daily       triamterene-hydrochlorothiazide (MAXZIDE-25) 37.5-25 MG per tablet Take 1 tablet by mouth daily         STOP taking these medications       predniSONE (SADIQ) 2 MG EC tablet Comments:   Reason for Stopping:             Allergies   Allergies   Allergen Reactions     Penicillins Itching     Scopolamine Nausea     Nausea for two weeks after patch was removed.     Data   Most Recent 3 CBC's:  Recent Labs   Lab Test  09/01/18 2326  08/19/15   1605  07/21/14   1324  11/14/13   1333   WBC  12.3*   --   13.9*  7.3   HGB  13.4  15.2  12.6  15.3   MCV  85   --   82  86   PLT  622*   --   619*  453*     Most Recent 3 BMP's:  Recent Labs   Lab Test  09/01/18   2326  08/19/15   1605  07/21/14   1324  11/14/13   1333   NA  140   --   132*  138   POTASSIUM  3.3*  3.4  3.9  3.5   CHLORIDE  101   --   91*  100   CO2  31   --   30  28   BUN  22   --   13  19   CR  1.00   --   0.71  0.78   ANIONGAP  8   --   11  10   LEIGH  8.5   --   8.4*  9.1   GLC  134*   --   124*  100*     Most Recent 3 Troponin's:  Recent Labs   Lab Test  09/01/18 2326   TROPI  <0.015

## 2018-09-02 NOTE — ED PROVIDER NOTES
History     Chief Complaint:  loss of consciousness    HPI   Mejia Ordaz is a 82 year old female with a history of vertigo who presents following a near syncopal episode. The patient states that she was sitting tonight when she suddenly felt weak and dizzy and she had a near syncopal episode. The patient states that she lost her vision but was still able to hear what was going on around her. After regaining full consciousness, she still feels dizzy and also has felt nauseated and has vomited.  She notes that she has had dizziness in the past, but notes that her dizziness tonight started much more suddenly than in the past. She feels more dizzy sitting up. She also notes that she had blurry vision earlier today. The patient denies any chest pain, shortness of breath, abdominal pain, urinary problems, diarrhea, bloody stool, recent falls, double vision, or headache. A year and a half ago she fainted and fell and this was her only other known syncopal episode. She lives independently.       Allergies:  Penicillins  Scopolamine     Medications:    Tylenol  Lipitor  Prednisone  Maxzide     Past Medical History:    Arteritis  Atrophic vaginitis  Bell's palsy  Chronic steroid use  Chronic UTI  CKD, stage 2  Headache  Hyperlipidemia  Hypertension  Microalbuminuria  OA  PONV  Trigeminal neuralgia    Past Surgical History:    Balloon compression rhizotomy  Hysterectomy  Excise lesion head  EGD    Family History:    Cerebrovascular disease    Social History:  Patient presents with daughter  Negative for tobacco use.  Negative for alcohol use.  Marital Status:  Single      Review of Systems   Eyes: Positive for visual disturbance.   Respiratory: Negative for shortness of breath.    Cardiovascular: Negative for chest pain.   Gastrointestinal: Positive for nausea and vomiting. Negative for abdominal pain, blood in stool and diarrhea.   Genitourinary: Negative for difficulty urinating, dysuria and hematuria.   Neurological:  "Positive for dizziness, syncope and weakness. Negative for headaches.   All other systems reviewed and are negative.      Physical Exam   First Vitals:  BP: 149/68  Pulse: 56  Heart Rate: 62  Temp: 96.6  F (35.9  C)  Resp: 20  Height: 157.5 cm (5' 2\")  Weight: 53.1 kg (117 lb)  SpO2: 97 %      Physical Exam  Constitutional: Well developed, nontox appearance, forlorn apperance  Head: Atraumatic.   Mouth/Throat: Oropharynx is clear and moist.   Neck:  no stridor  Eyes: no scleral icterus, PERRL, EOMI  Cardiovascular: RRR, 2+ bilat radial pulses  Pulmonary/Chest: nml resp effort, Clear BS bilat  Abdominal: ND, +BS, soft, NT, no rebound or guarding   : no CVA tenderness bilat  Ext: Warm, well perfused, no edema  Neurological: A&O,  Baseline L facial paralysis (known chronic bell's palsy), nml finger to nose, 5/5 strength throughout upper and lower ext, symmetric; sensation grossly intact  Skin: Skin is warm and dry.   Psychiatric: Behavior is normal. Thought content normal.   Nursing note and vitals reviewed.    Emergency Department Course   ECG:  Time: 2324  Vent. Rate 67 bpm. OH interval 214. QRS duration 84. QT/QTc 436/460. P-R-T axis 0 -26 56. Sinus rhythm with 1st AV block. Otherwise normal ECG. Read time: 2328    Laboratory:  UA with micro: Nitrite: positive, Leukocyte esterase: large, WBC: 23 (H) o/w negative  CBC: WBC: 12.3 (H), HGB: 13.4, PLT: 622 (H)  BMP: Glucose 134 (H), Potassium: 3.3 (L), GFR: 53 (L) o/w WNL (Creatinine: 1.00)  Troponin: <0.015  TSH: 1.25  Magnesium: 2.4 (H)  Urine culture: pending    Interventions:  2354 - Antivert 25 mg PO  2354 -Zofran 4 mg IV  0029 - NS 1L IV  0111 - Rocephin 1 g IV  0111 - K-dur 40 mEq PO    Emergency Department Course:  Nursing notes and vitals reviewed.  2330: I performed an exam of the patient as documented above.     0059: Findings and plan explained to the Patient who consents to admission.     0106: Discussed the patient with Dr. Aguilar, who will admit the " patient to a observation bed for further monitoring, evaluation, and treatment.       Impression & Plan      Medical Decision Makin-year-old female presenting with near syncope, vertiginous symptoms    Differential diagnosis includes BPPV, vasovagal near syncope, orthostatic near syncope, electrolyte abnormality, dysrhythmia.  Intracranial abnormality such as hemorrhage or mass seems less likely given patient's lack of headache and similar vertiginous symptoms previously.  She reports she has fainted in the past as well.  EKG interpretation as noted above and unchanged.  Labs significant for mild hypokalemia, UA concerning for infection.  Given the patient's hx of similar vertiginous symptoms, no acute neurologic deficits on physical exam, imaging was deferred at this time.  She was given ceftriaxone for treatment of UTI and medications for symptom management as noted above.  Given patient's age, her persistent symptoms although somewhat improved, and that she lives alone, I think it would be reasonable to admit her to observation overnight for further symptom control and medication management.  Patient and her daughter counseled on results, diagnosis and disposition.  They are understanding and agreeable to plan.  Patient was subsequently admitted in stable condition.      Diagnosis:    ICD-10-CM   1. Urinary tract infection without hematuria, site unspecified N39.0   2. Vertigo R42   3. Near syncope R55       I, Gopi Howard, am serving as a scribe on 2018 at 11:30 PM to personally document services performed by Chaparro Fountain MD based on my observations and the provider's statements to me.     Gopi Howard  2018    EMERGENCY DEPARTMENT       Chaparro Fountain MD  [phone]

## 2018-09-02 NOTE — PROGRESS NOTES
OBSERVATION GOALS    -diagnostic tests and consults completed and resulted: met  -vital signs normal or at patient baseline: met  -tolerating oral intake to maintain hydration: met  -returns to baseline functional status: met

## 2018-09-02 NOTE — PLAN OF CARE
Problem: Patient Care Overview  Goal: Plan of Care/Patient Progress Review   Observation goals PRIOR TO DISCHARGE     -Diagnostic tests and consults completed and resulted -Not met  -vital signs normal or at patient baseline -met  -tolerating oral intake to maintain hydration-met   -returns to baseline functional status-not met

## 2018-09-02 NOTE — PLAN OF CARE
Problem: Patient Care Overview  Goal: Plan of Care/Patient Progress Review   Observation goals PRIOR TO DISCHARGE     Comments: -diagnostic tests and consults completed and resulted   -vital signs normal or at patient baseline   -tolerating oral intake to maintain hydration   -returns to baseline functional status

## 2018-09-02 NOTE — H&P
[facility]    History and Physical  Hospitalist       Date of Admission:  9/1/2018    Assessment & Plan   Mejia Ordaz is a 82 year old female who presents with vertigo and near syncope    Vertigo: Patient with recurrent episodes of vertigo.  History of presyncopal events associated with this in the past as well including 2014, 2016 through outside system.  No clear movement precipitating tonight's episode, though patient describes leftward rotation with vertigo.  -As needed antiemetics including Zofran, Compazine, Reglan  -Meclizine 25 mg 3 times daily as needed for vertiginous symptoms  -Diet as tolerated  -Ambulate with nursing staff to assess stability/safety for discharge.  -Prior to admission hydrochlorothiazide held    Presyncope: Suspect vagal response.  Patient describes onset of vertigo followed by nausea, then cold sweats, tunnel vision.  Baseline heart rate appears to be in the 60s.  Similar episodes in 2014, 2016 for which she was evaluated through her primary care system.  -Cardiac telemetry  -As this is a recurrent issue for patient in the setting of vertigo, defer repeat TTE to primary care provider.  Appears to have had a normal TTE as of 2006.    Abnormal urinalysis: Possibly represents urinary tract infection, though largely asymptomatic from a  standpoint.  Patient with a known history of vertigo, so seems less likely that vertigo was precipitated by UTI  -Urine culture pending  -Continues on ceftriaxone pending urine culture results.  Low threshold to discontinue anti-infectives.    Hypokalemia:  -Prior to admission hydrochlorothiazide held  -Potassium, magnesium, phosphorus replacement protocols in place.    Giant cell arteritis: Followed by arthritis and rheumatology clinic.  -Continue low-dose prednisone.  Appears to be on 1 mg versus 2 mg daily oral prednisone by review of outside records.  Will reorder as 2 mg every morning pending allergy/rheumatology records.    Chronic  medical issues:  Patient with a history of trigeminal neuralgia now status post occipital craniectomy for decompression.    # Pain Assessment:  Current Pain Score 09/02/18   Patient currently in pain? denies   Pain score (0-10) -   Pain location -   Pain descriptors -   Mejia fernando pain level was assessed and she currently denies pain.      DVT Prophylaxis: Low Risk/Ambulatory with no VTE prophylaxis indicated    Code Status: Full Code    Disposition: Expected discharge potentially later today, 9/2/18    Miguel Bush [place]    Primary Care Physician   Delmy Matias MD    Chief Complaint   Vertigo, almost passed out    History is obtained from the patient, chart review, patient's daughter at bedside, discussion with Dr. Fountain in the emergency department, review of outside records including historical emergency department presentations for near syncope and falls related to onset of vertigo including historical MRI imaging of brain.    History of Present Illness   Mejia Ordaz is a 82 year old female who presents with sudden onset leftward spinning vertiginous symptoms while playing [email or web address] with her friends tonight.  Patient history of vertigo as well as a history of presyncope and falls associated with vertiginous spells dating back at least 10 years.  Daughter describes patient with an episode of vertigo after returning from a cruise which required patient to be on bedrest for several days with hospitalization in [place], [place].    Patient describes sitting up in a chair when she experienced a spinning sensation consistent with vertigo.  Sudden onset.  Patient closed her eyes, and when she opened them again, the spinning was worse.  Resulted in increased nausea and subsequently, patient with symptoms of lightheadedness, cold sweats, feeling as though she was going to pass out.  Patient believes that she had some blurred vision versus tunnel vision with this, laid her head on the table while still sitting.  Patient  remained seated, friends tended to her by rubbing herbal medications on her forehead.  Patient states that she did not pass out, she was awake with her eyes closed and could hear everything her friends were doing.  Continued to feel unwell until her daughter arrived to transport her to the emergency department.    In the emergency department, patient continues to have some vertiginous symptoms.  Much improved, however.  No longer feeling as though she is going to pass out, no longer with cold sweats.  Did have emesis in the emergency department, though nausea is improving.  Patient noted to have resting bradycardia to 55-60 range, though this appears to be baseline.    Discussed at length with patient and daughter regarding onset and frequency of symptoms.  As far as I can ascertain, patient does not have symptoms of lightheadedness when standing to suggest volume depletion or orthostasis, though she does have vertiginous symptoms not infrequently which typically resolve quickly.  Patient seems to feel that today's episode of vertigo came on quicker than most, though I would consider it uncommon to have vertigo which does not come on suddenly.  Patient occasionally mentions that she has never had a similar episode in the past, though in review of records as well as per daughter's recollection, patient has had similar episodes as noted above including presyncopal/syncopal episodes following onset of vertigo in 2014, 2016.    Patient was found to have an abnormal urinalysis.  Patient describes a history of intermittent urinary tract infections, though does not feel that she has dysuria or any symptoms of urinary tract infection currently.  Was initiated on ceftriaxone pending urine culture results.    Patient does not currently have any visual changes.    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  No changes to this currently.      Past Medical History    I have reviewed this  patient's medical history and updated it with pertinent information if needed.   Past Medical History:   Diagnosis Date     Arteritis (H)     CHRONIC     Atrophic vaginitis 11/13/2012     Bell's palsy     rt facial pain controlled by Tegretal     Chronic steroid use 8/19/2015     Chronic UTI 11/13/2012     CKD (chronic kidney disease) stage 2, GFR 60-89 ml/min 6/2/2012     Headache(784.0)      Hyperlipidemia LDL goal <100 6/2/2012     Hypertension goal BP (blood pressure) < 140/90 9/27/2011     Microalbuminuria 11/13/2012     Mixed hyperlipidemia 11/13/2012     Need for prophylactic hormone replacement therapy (postmenopausal)      Osteoporosis, unspecified 2/03    on HRT, calcium and Vit.     Other and unspecified hyperlipidemia      PONV (postoperative nausea and vomiting) 8/19/2015     Trigeminal neuralgia        Past Surgical History   I have reviewed this patient's surgical history and updated it with pertinent information if needed.  Past Surgical History:   Procedure Laterality Date     BALLOON COMPRESSION RHIZOTOMY Right 8/20/2015    Procedure: BALLOON COMPRESSION RHIZOTOMY;  Surgeon: Luis Daniel Marino MD;  Location: [facility] OR     C NONSPECIFIC PROCEDURE  1970    hysterectomy BSO     C NONSPECIFIC PROCEDURE  1999    flex     COLONOSCOPY  2007     EXCISE LESION HEAD Left 8/9/2017    Procedure: EXCISE LESION HEAD;  EXCISION OF PREAURICULAR CYST LEFT EAR;  Surgeon: Yordan Starkey MD;  [facility] ESOPHAGOSCOPY, DIAGNOSTIC  2009    gastritis       Prior to Admission Medications   Prior to Admission Medications   Prescriptions Last Dose Informant Patient Reported? Taking?   HYDROcodone-acetaminophen (NORCO) 5-325 MG per tablet   No No   Sig: Take 1-2 tablets by mouth every 4 hours as needed for other (Moderate to Severe Pain)   acetaminophen (TYLENOL) 500 MG tablet   Yes No   Sig: Take 500 mg by mouth every 6 hours as needed for mild pain   atorvastatin (LIPITOR) 10 MG tablet   No No   Sig: Take 1  tablet (10 mg) by mouth daily   predniSONE (SADIQ) 2 MG EC tablet   Yes No   Sig: Take 2 mg by mouth daily   triamterene-hydrochlorothiazide (MAXZIDE-25) 37.5-25 MG per tablet   Yes No   Sig: Take 1 tablet by mouth daily      Facility-Administered Medications: None     Allergies   Allergies   Allergen Reactions     Penicillins Itching     Scopolamine Nausea     Nausea for two weeks after patch was removed.       Social History   I have reviewed this patient's social history and updated it with pertinent information if needed. Mejia Ordaz  reports that she has never smoked. She has never used smokeless tobacco. She reports that she does not drink alcohol or use illicit drugs.     Family History   I have reviewed this patient's family history and updated it with pertinent information if needed.   Family History   Problem Relation Age of Onset     Cerebrovascular Disease Father    No family history of vertigo    Review of Systems   The 10 point Review of Systems is negative other than noted in the HPI or here.   No fevers or chills, no dysuria.  Had been eating and drinking normally prior to today's episode    Physical Exam   Temp: 97  F (36.1  C) Temp src: Temporal BP: 144/79 Pulse: 56 Heart Rate: 66 Resp: 18 SpO2: 97 % O2 Device: None (Room air)    Vital Signs with Ranges  Temp:  [96.6  F (35.9  C)-97  F (36.1  C)] 97  F (36.1  C)  Pulse:  [56] 56  Heart Rate:  [54-66] 66  Resp:  [10-20] 18  BP: (144-155)/(65-79) 144/79  SpO2:  [97 %-100 %] 97 %  117 lbs 0 oz    Constitutional: no acute distress, alert, conversant  Eyes: no scleral icterus or injection.  No nystagmus.  Patient believes her vertigo gets slightly worse when she gazes to the left.  HEENT: moist mucous membranes  Respiratory: breath sounds clear bilaterally to auscultation, no wheezes, no crackles  Cardiovascular: regular rate and rhythm, no murmur  GI: abdomen soft, non-tender, normoactive bowel sounds, no masses  Lymph/Hematologic: no lower extremity  swelling  Genitourinary: not examined  Skin: no rashes  Musculoskeletal: muscular tone intact in all extremities  Neurologic: mental status grossly intact, left facial droop in the setting of known Bell's palsy, alert  Psychiatric: normal affect    Data   Data reviewed today:  I personally reviewed the EKG tracing showing Normal sinus rhythm with first-degree AV block.  Note by telemetry that patient is intermittently bradycardic to the 55-60 range.    Recent Labs  Lab 09/01/18  2326   WBC 12.3*   HGB 13.4   MCV 85   *      POTASSIUM 3.3*   CHLORIDE 101   CO2 31   BUN 22   CR 1.00   ANIONGAP 8   LEIGH 8.5   *   TROPI <0.015       No results found for this or any previous visit (from the past 24 hour(s)).

## 2018-09-02 NOTE — PROGRESS NOTES
RECEIVING UNIT ED HANDOFF REVIEW    ED Nurse Handoff Report was reviewed by: Edna Salter on September 2, 2018 at 1:24 AM

## 2018-09-04 LAB
BACTERIA SPEC CULT: ABNORMAL
Lab: ABNORMAL
SPECIMEN SOURCE: ABNORMAL

## 2018-09-07 ENCOUNTER — HOSPITAL ENCOUNTER (EMERGENCY)
Facility: CLINIC | Age: 83
Discharge: HOME | End: 2018-09-07
Attending: EMERGENCY MEDICINE | Admitting: EMERGENCY MEDICINE
Payer: MEDICARE

## 2018-09-07 VITALS
RESPIRATION RATE: 18 BRPM | OXYGEN SATURATION: 98 % | TEMPERATURE: 97.5 F | HEIGHT: 62 IN | WEIGHT: 114.6 LBS | BODY MASS INDEX: 21.09 KG/M2 | DIASTOLIC BLOOD PRESSURE: 71 MMHG | SYSTOLIC BLOOD PRESSURE: 168 MMHG | HEART RATE: 58 BPM

## 2018-09-07 DIAGNOSIS — R42 VERTIGO: ICD-10-CM

## 2018-09-07 LAB
ALBUMIN UR-MCNC: NEGATIVE MG/DL
ANION GAP SERPL CALCULATED.3IONS-SCNC: 8 MMOL/L (ref 3–14)
APPEARANCE UR: CLEAR
BACTERIA #/AREA URNS HPF: ABNORMAL /HPF
BASOPHILS # BLD AUTO: 0 10E9/L (ref 0–0.2)
BASOPHILS NFR BLD AUTO: 0.2 %
BILIRUB UR QL STRIP: NEGATIVE
BUN SERPL-MCNC: 13 MG/DL (ref 7–30)
CALCIUM SERPL-MCNC: 8.6 MG/DL (ref 8.5–10.1)
CHLORIDE SERPL-SCNC: 98 MMOL/L (ref 94–109)
CO2 SERPL-SCNC: 31 MMOL/L (ref 20–32)
COLOR UR AUTO: ABNORMAL
CREAT SERPL-MCNC: 0.88 MG/DL (ref 0.52–1.04)
DIFFERENTIAL METHOD BLD: ABNORMAL
EOSINOPHIL # BLD AUTO: 0.3 10E9/L (ref 0–0.7)
EOSINOPHIL NFR BLD AUTO: 3.3 %
ERYTHROCYTE [DISTWIDTH] IN BLOOD BY AUTOMATED COUNT: 14.8 % (ref 10–15)
GFR SERPL CREATININE-BSD FRML MDRD: 62 ML/MIN/1.7M2
GLUCOSE SERPL-MCNC: 101 MG/DL (ref 70–99)
GLUCOSE UR STRIP-MCNC: NEGATIVE MG/DL
HCT VFR BLD AUTO: 40 % (ref 35–47)
HGB BLD-MCNC: 13.2 G/DL (ref 11.7–15.7)
HGB UR QL STRIP: NEGATIVE
IMM GRANULOCYTES # BLD: 0 10E9/L (ref 0–0.4)
IMM GRANULOCYTES NFR BLD: 0.3 %
KETONES UR STRIP-MCNC: NEGATIVE MG/DL
LEUKOCYTE ESTERASE UR QL STRIP: NEGATIVE
LYMPHOCYTES # BLD AUTO: 2.4 10E9/L (ref 0.8–5.3)
LYMPHOCYTES NFR BLD AUTO: 24 %
MCH RBC QN AUTO: 27.7 PG (ref 26.5–33)
MCHC RBC AUTO-ENTMCNC: 33 G/DL (ref 31.5–36.5)
MCV RBC AUTO: 84 FL (ref 78–100)
MONOCYTES # BLD AUTO: 0.8 10E9/L (ref 0–1.3)
MONOCYTES NFR BLD AUTO: 8.5 %
NEUTROPHILS # BLD AUTO: 6.3 10E9/L (ref 1.6–8.3)
NEUTROPHILS NFR BLD AUTO: 63.7 %
NITRATE UR QL: NEGATIVE
NRBC # BLD AUTO: 0 10*3/UL
NRBC BLD AUTO-RTO: 0 /100
PH UR STRIP: 7 PH (ref 5–7)
PLATELET # BLD AUTO: 627 10E9/L (ref 150–450)
POTASSIUM SERPL-SCNC: 3.4 MMOL/L (ref 3.4–5.3)
RBC # BLD AUTO: 4.77 10E12/L (ref 3.8–5.2)
RBC #/AREA URNS AUTO: 0 /HPF (ref 0–2)
SODIUM SERPL-SCNC: 137 MMOL/L (ref 133–144)
SOURCE: ABNORMAL
SP GR UR STRIP: 1 (ref 1–1.03)
SQUAMOUS #/AREA URNS AUTO: <1 /HPF (ref 0–1)
UROBILINOGEN UR STRIP-MCNC: NORMAL MG/DL (ref 0–2)
WBC # BLD AUTO: 9.9 10E9/L (ref 4–11)
WBC #/AREA URNS AUTO: 5 /HPF (ref 0–5)

## 2018-09-07 PROCEDURE — 99283 EMERGENCY DEPT VISIT LOW MDM: CPT

## 2018-09-07 PROCEDURE — 85025 COMPLETE CBC W/AUTO DIFF WBC: CPT | Performed by: EMERGENCY MEDICINE

## 2018-09-07 PROCEDURE — 80048 BASIC METABOLIC PNL TOTAL CA: CPT | Performed by: EMERGENCY MEDICINE

## 2018-09-07 PROCEDURE — 87086 URINE CULTURE/COLONY COUNT: CPT | Performed by: EMERGENCY MEDICINE

## 2018-09-07 PROCEDURE — 81001 URINALYSIS AUTO W/SCOPE: CPT | Performed by: EMERGENCY MEDICINE

## 2018-09-07 RX ORDER — MECLIZINE HCL 12.5 MG 12.5 MG/1
12.5 TABLET ORAL 4 TIMES DAILY PRN
Qty: 30 TABLET | Refills: 0 | Status: SHIPPED | OUTPATIENT
Start: 2018-09-07 | End: 2022-06-04

## 2018-09-07 ASSESSMENT — ENCOUNTER SYMPTOMS
DYSURIA: 0
HEADACHES: 0
DIZZINESS: 1

## 2018-09-07 NOTE — ED AVS SNAPSHOT
Emergency Department    [address] [facility] [address]    Phone:  [phone]    Fax:  [phone]                                       Mejia Ordaz   MRN: [id number]    Department:   Emergency Department   Date of Visit:  9/7/2018           Patient Information     Date Of Birth          1935        Your diagnoses for this visit were:     Vertigo        You were seen by Letty Martinez MD.      Follow-up Information     Follow up with Delmy Matias MD.    Specialty:  Family Practice    Why:  3 -4 days    Contact information:    [facility]   [address]  [facility] [address] [phone]          Discharge Instructions       You  Had ESBL infection on your first urine culture. WE have recultured today's urine. You should call your doctor on Monday for result.   Discharge Instructions  Vertigo  You have been diagnosed with vertigo.  This is a dizzy feeling often described as spinning or that the room is moving around you. You will often have nausea (sick to your stomach), vomiting (throwing up), and balance problems with it.  Vertigo is usually caused by a problem in the inner ear which helps control your balance.  Many things can cause vertigo, including calcium collections in the inner ear, a virus infection of the inner ear, concussion, migraine, and some medicines.  Luckily, these causes are not life threatening and will eventually go away.  However, sometimes there is a serious problem that does not show up right away.  Generally, every Emergency Department visit should have a follow-up clinic visit with either a primary or a specialty clinic/provider. Please follow-up as instructed by your emergency provider today.  Return to the Emergency Department if you have:    New or severe headache.    Double vision (seeing two of things).    Trouble speaking or hearing.    Weakness or trouble moving/using one side of your body.    Passing out.    Numbness or tingling.    Chest  pain.    Vomiting that will not stop.    Treatment:    There are several commonly prescribed medications:  o Antihistamines such as meclizine (Antivert ), dimenhydrinate (Dramamine ), or diphenhydramine (Benadryl ).  o Prescription anti-nausea medicines, such as promethazine (Phenergan ), metoclopramide (Reglan ), or ondansetron (Zofran ).  o Prescription sedative medicines, such as diazepam (Valium ), lorazepam (Ativan ), or clonazepam (Klonopin ).    Most of these medicines make you sleepy, and you should not take them before you work or drive. You should only take prescription medicines to treat severe vertigo symptoms, and you should stop the medicine when your symptoms improve.    Follow Up:    If you have vertigo longer than three days, it is important that you follow up either with your primary provider or an Ear, Nose, and Throat (ENT) specialist.  You may need further testing to evaluate your vertigo and you may also need  vestibular  therapy which is a special form of physical therapy to make the vertigo go away.    If you were given a prescription for medicine here today, be sure to read all of the information (including the package insert) that comes with your prescription.  This will include important information about the medicine, its side effects, and any warnings that you need to know about.  The pharmacist who fills the prescription can provide more information and answer questions you may have about the medicine.  If you have questions or concerns that the pharmacist cannot address, please call or return to the Emergency Department.     Remember that you can always come back to the Emergency Department if you are not able to see your regular provider in the amount of time listed above, if you get any new symptoms, or if there is anything that worries you.    24 Hour Appointment Hotline       To make an appointment at any [facility], call [phone] ([phone]). If you don't have a  family doctor or clinic, we will help you find one. [place] clinics are conveniently located to serve the needs of you and your family.             Review of your medicines      START taking        Dose / Directions Last dose taken    meclizine 12.5 MG tablet   Commonly known as:  ANTIVERT   Dose:  12.5 mg   Quantity:  30 tablet        Take 1 tablet (12.5 mg) by mouth 4 times daily as needed for dizziness   Refills:  0          Our records show that you are taking the medicines listed below. If these are incorrect, please call your family doctor or clinic.        Dose / Directions Last dose taken    acetaminophen 500 MG tablet   Commonly known as:  TYLENOL   Dose:  500 mg        Take 500 mg by mouth every 6 hours as needed for mild pain   Refills:  0        atorvastatin 10 MG tablet   Commonly known as:  LIPITOR   Dose:  10 mg   Quantity:  90 tablet        Take 1 tablet (10 mg) by mouth daily   Refills:  0        predniSONE 1 MG tablet   Commonly known as:  DELTASONE   Dose:  1 mg        Take 1 mg by mouth daily   Refills:  0        triamterene-hydrochlorothiazide 37.5-25 MG per tablet   Commonly known as:  MAXZIDE-25   Dose:  1 tablet        Take 1 tablet by mouth daily   Refills:  0                Prescriptions were sent or printed at these locations (1 Prescription)                   Other Prescriptions                Printed at Department/Unit printer (1 of 1)         meclizine (ANTIVERT) 12.5 MG tablet                Procedures and tests performed during your visit     Basic metabolic panel    CBC with platelets differential    UA with Microscopic      Orders Needing Specimen Collection     None      Pending Results     No orders found from 9/5/2018 to 9/8/2018.            Pending Culture Results     No orders found from 9/5/2018 to 9/8/2018.            Pending Results Instructions     If you had any lab results that were not finalized at the time of your Discharge, you can call the ED Lab Result RN at  [phone]. You will be contacted by this team for any positive Lab results or changes in treatment. The nurses are available 7 days a week from 10A to 6:30P.  You can leave a message 24 hours per day and they will return your call.        Test Results From Your Hospital Stay        9/7/2018  6:32 PM      Component Results     Component Value Ref Range & Units Status    Color Urine Straw  Final    Appearance Urine Clear  Final    Glucose Urine Negative NEG^Negative mg/dL Final    Bilirubin Urine Negative NEG^Negative Final    Ketones Urine Negative NEG^Negative mg/dL Final    Specific Gravity Urine 1.003 1.003 - 1.035 Final    Blood Urine Negative NEG^Negative Final    pH Urine 7.0 5.0 - 7.0 pH Final    Protein Albumin Urine Negative NEG^Negative mg/dL Final    Urobilinogen mg/dL Normal 0.0 - 2.0 mg/dL Final    Nitrite Urine Negative NEG^Negative Final    Leukocyte Esterase Urine Negative NEG^Negative Final    Source Midstream Urine  Final    WBC Urine 5 0 - 5 /HPF Final    RBC Urine 0 0 - 2 /HPF Final    Bacteria Urine Few (A) NEG^Negative /HPF Final    Squamous Epithelial /HPF Urine <1 0 - 1 /HPF Final         9/7/2018  7:32 PM      Component Results     Component Value Ref Range & Units Status    WBC 9.9 4.0 - 11.0 10e9/L Final    RBC Count 4.77 3.8 - 5.2 10e12/L Final    Hemoglobin 13.2 11.7 - 15.7 g/dL Final    Hematocrit 40.0 35.0 - 47.0 % Final    MCV 84 78 - 100 fl Final    MCH 27.7 26.5 - 33.0 pg Final    MCHC 33.0 31.5 - 36.5 g/dL Final    RDW 14.8 10.0 - 15.0 % Final    Platelet Count 627 (H) 150 - 450 10e9/L Final    Diff Method Automated Method  Final    % Neutrophils 63.7 % Final    % Lymphocytes 24.0 % Final    % Monocytes 8.5 % Final    % Eosinophils 3.3 % Final    % Basophils 0.2 % Final    % Immature Granulocytes 0.3 % Final    Nucleated RBCs 0 0 /100 Final    Absolute Neutrophil 6.3 1.6 - 8.3 10e9/L Final    Absolute Lymphocytes 2.4 0.8 - 5.3 10e9/L Final    Absolute Monocytes 0.8 0.0 - 1.3  10e9/L Final    Absolute Eosinophils 0.3 0.0 - 0.7 10e9/L Final    Absolute Basophils 0.0 0.0 - 0.2 10e9/L Final    Abs Immature Granulocytes 0.0 0 - 0.4 10e9/L Final    Absolute Nucleated RBC 0.0  Final         9/7/2018  7:45 PM      Component Results     Component Value Ref Range & Units Status    Sodium 137 133 - 144 mmol/L Final    Potassium 3.4 3.4 - 5.3 mmol/L Final    Chloride 98 94 - 109 mmol/L Final    Carbon Dioxide 31 20 - 32 mmol/L Final    Anion Gap 8 3 - 14 mmol/L Final    Glucose 101 (H) 70 - 99 mg/dL Final    Urea Nitrogen 13 7 - 30 mg/dL Final    Creatinine 0.88 0.52 - 1.04 mg/dL Final    GFR Estimate 62 >60 mL/min/1.7m2 Final    Non  GFR Calc    GFR Estimate If Black 75 >60 mL/min/1.7m2 Final    African American GFR Calc    Calcium 8.6 8.5 - 10.1 mg/dL Final                Clinical Quality Measure: Blood Pressure Screening     Your blood pressure was checked while you were in the emergency department today. The last reading we obtained was  BP: 164/72 . Please read the guidelines below about what these numbers mean and what you should do about them.  If your systolic blood pressure (the top number) is less than 120 and your diastolic blood pressure (the bottom number) is less than 80, then your blood pressure is normal. There is nothing more that you need to do about it.  If your systolic blood pressure (the top number) is 120-139 or your diastolic blood pressure (the bottom number) is 80-89, your blood pressure may be higher than it should be. You should have your blood pressure rechecked within a year by a primary care provider.  If your systolic blood pressure (the top number) is 140 or greater or your diastolic blood pressure (the bottom number) is 90 or greater, you may have high blood pressure. High blood pressure is treatable, but if left untreated over time it can put you at risk for heart attack, stroke, or kidney failure. You should have your blood pressure rechecked by a  "primary care provider within the next 4 weeks.  If your provider in the emergency department today gave you specific instructions to follow-up with your doctor or provider even sooner than that, you should follow that instruction and not wait for up to 4 weeks for your follow-up visit.        Thank you for choosing [facility]       Thank you for choosing [facility] for your care. Our goal is always to provide you with excellent care. Hearing back from our patients is one way we can continue to improve our services. Please take a few minutes to complete the written survey that you may receive in the mail after you visit with us. Thank you!        [facility] Information     [facility] lets you send messages to your doctor, view your test results, renew your prescriptions, schedule appointments and more. To sign up, go to [email or web address] . Click on \"Log in\" on the left side of the screen, which will take you to the Welcome page. Then click on \"Sign up Now\" on the right side of the page.     You will be asked to enter the access code listed below, as well as some personal information. Please follow the directions to create your username and password.     Your access code is: [email or web address]  Expires: 2018  1:34 AM     Your access code will  in 90 days. If you need help or a new code, please call your [facility] clinic or [phone].        Care EveryWhere ID     This is your Care EveryWhere ID. This could be used by other organizations to access your [facility] medical records  [id number]        Equal Access to Services     JACOBO YARBROUGH : Hadii alison riley Sodonal, waaxda luqadaha, qaybta kaalmada tor ernandez . So [facility] [phone].    ATENCIÓN: Si habla español, tiene a arce disposición servicios gratuitos de asistencia lingüística. Llame al [phone].    We comply with applicable federal civil rights laws and [place] laws. We do not discriminate on the basis of " race, color, national origin, age, disability, sex, sexual orientation, or gender identity.            After Visit Summary       This is your record. Keep this with you and show to your community pharmacist(s) and doctor(s) at your next visit.

## 2018-09-07 NOTE — ED AVS SNAPSHOT
Emergency Department    [address] [place] [address]    Phone:  [phone]    Fax:  [phone]                                       Mejia Ordaz   MRN: [id number]    Department:   Emergency Department   Date of Visit:  9/7/2018           After Visit Summary Signature Page     I have received my discharge instructions, and my questions have been answered. I have discussed any challenges I see with this plan with the nurse or doctor.    ..........................................................................................................................................  Patient/Patient Representative Signature      ..........................................................................................................................................  Patient Representative Print Name and Relationship to Patient    ..................................................               ................................................  Date                                            Time    ..........................................................................................................................................  Reviewed by Signature/Title    ...................................................              ..............................................  Date                                                            Time          22EPIC Rev 08/18

## 2018-09-07 NOTE — ED PROVIDER NOTES
"  History     Chief Complaint:  Dizziness    HPI   Mejia Ordaz is a 82 year old female with a history of vertigo, chronic steroid use, hyperlipidemia, hypertension and CKD who presents with dizziness. The patient reports that she has intermittent dizziness for the past decade that is worse when she gets up quickly.  She was hospitalized 9/1-9/2; see discharge summary below.  She called her primary care provider for a follow-up appointment today and told him that she had not felt great this week and it was advised to come to the ED tonight to have a \"head scan\" she denies that she has had further vertigo this week but just says that she does not feel quite right and may be a little more tired than usual.  She denies headache, change in vision, change in speech, numbness or weakness.  She says she is able to ambulate independently.     Of note she was thought to have UTI and admission was treated with Rocephin.  However she was discharged off antibiotics was seen that they would check the urine culture.  She has not received any calls.  The urine did grow out ESBL.  Patient has no urinary symptoms.    Medications:    Lipitor  Prednisone  Maxzide     Past Medical History:    Arteritis  Atrophic vaginitis  Bell's palsy  Chronic steroid use  Chronic UTI  Chronic kidney disease  Hyperlipidemia  Hypertension  Microalbuminuria  Osteoporosis  Trigeminal neuralgia   Vertigo  Preauricular cyst  Elevated glucose     Past Surgical History:    Balloon compression rhizotomy  Hysterectomy  Nonspecific procedure, flex  Excise lesion head  Esophagoscopy     Family History:    Cerebrovascular disease    Social History:  Marital Status:  Single [1]  Smoking status: Never Smoker  Alcohol use: No  The patient lives in a house alone.  The patient presents to the ED alone.     Review of Systems   Eyes: Negative for visual disturbance.   Genitourinary: Negative for dysuria.   Neurological: Positive for dizziness. Negative for headaches. " "  All other systems reviewed and are negative.    Physical Exam     Patient Vitals for the past 24 hrs:   BP Temp Temp src Pulse Resp SpO2 Height Weight   09/07/18 2016 - - - - - 98 % - -   09/07/18 2015 168/71 - - - - - - -   09/07/18 1639 164/72 97.5  F (36.4  C) Oral 58 18 98 % 1.575 m (5' 2\") 52 kg (114 lb 9.6 oz)       Physical Exam  Constitutional:  Oriented to person, place, and time.      Appears well-developed and well-nourished.   HENT:   Head:    Normocephalic and atraumatic.   Right Ear:   Tympanic membrane and external ear normal.   Left Ear:   Tympanic membrane and external ear normal.   Mouth/Throat:   Oropharynx is clear and moist and mucous membranes are normal.   Eyes:    Conjunctivae normal and EOM are normal.      Pupils are equal, round, and reactive to light.   Neck:    Normal range of motion. Neck supple.   Cardiovascular:  Normal rate, S1 normal, S2 normal and normal heart sounds.      No gallop. No murmur heard.  Pulmonary/Chest:  Effort normal and breath sounds normal.      No wheezes. No rhonchi. No rales.   Abdominal:   Soft. Normal appearance. No hepatosplenomegaly.      No tenderness. No rigidity, no rebound, no guarding.      No CVA tenderness.   Musculoskeletal:  Normal range of motion.   Neurological:   Alert and oriented to person, place, and time.      Normal strength and normal reflexes.      No cranial nerve deficit or sensory deficit.      GCS eye subscore is 4. GCS verbal subscore is 5.      GCS motor subscore is 6. Finger to nose intact bilaterally.    Emergency Department Course   Laboratory:  CBC:  (H), o/w WNL (WBC 9.9, HGB 13.2)  CMP: Glucose 101 (H), o/w WNL (Creatinine 0.88)  UA: Bacteria--Few, o/w Negative    Urine Culture: in process    Emergency Department Course:  Past medical records, nursing notes, and vitals reviewed.  1837: I performed an exam of the patient and obtained history, as documented above.   Contact isolation was ordered for the patient.  A " "urine sample was collected and sent for laboratory testing, findings above.  IV inserted and blood samples were collected and sent for laboratory testing, findings above.    1945: I rechecked the patient. Explained findings to the patient.    2000: I rechecked the patient. Explained findings to the patient.    Findings and plan explained to the patient and her family. Patient discharged home with instructions regarding supportive care, medications, and reasons to return. The importance of close follow-up was reviewed.    Impression & Plan    Medical Decision Making:  The patient is an 82-year-old female with a history of vertigo and recent hospitalization for same who called her doctor's office today to get a follow-up appointment and one telling them she had not been feeling well this week was referred here for \"scan\".  The patient really does not have any dizziness since that time she was in the hospital.  On looking at her records she did have an MRI head scan in 2016 that was unremarkable.  Her vertigo has been long-standing over 10 years.  I do not think she needs any further evaluation of this acutely.  Of note her urine culture from when she was in the hospital grew out ESBL.  The patient was not informed of this.  She denies any urinary symptoms.  Her urine was repeated and was negative.  I talked to the hospitalist who felt that this urine was negative she could be sent home with a urine culture done if that were positive she should be treated for the ESBL possibly in the hospital.  The patient is agreement with that.  I did write a prescription for meclizine for the patient if she should have some vertigo.  She can follow-up with her primary otherwise next week.  She should call her primary in 3 days for the urine culture result.  She was eating well here and with says she is able to ambulate independently.    Diagnosis:    ICD-10-CM    1. Vertigo R42 Urine Culture Aerobic Bacterial "       Disposition:  discharged to home    Discharge Medications:  New Prescriptions    MECLIZINE (ANTIVERT) 12.5 MG TABLET    Take 1 tablet (12.5 mg) by mouth 4 times daily as needed for dizziness         Janay Orozco  9/7/2018    EMERGENCY DEPARTMENT  I, Janay Orozco, am serving as a scribe at 6:37 PM on 9/7/2018 to document services personally performed by Letty Martinez MD based on my observations and the provider's statements to me.        Letty Martinez MD  09/08/18 5581

## 2018-09-08 LAB
BACTERIA SPEC CULT: NORMAL
Lab: NORMAL
SPECIMEN SOURCE: NORMAL

## 2018-09-08 NOTE — DISCHARGE INSTRUCTIONS
You  Had ESBL infection on your first urine culture. WE have recultured today's urine. You should call your doctor on Monday for result.   Discharge Instructions  Vertigo  You have been diagnosed with vertigo.  This is a dizzy feeling often described as spinning or that the room is moving around you. You will often have nausea (sick to your stomach), vomiting (throwing up), and balance problems with it.  Vertigo is usually caused by a problem in the inner ear which helps control your balance.  Many things can cause vertigo, including calcium collections in the inner ear, a virus infection of the inner ear, concussion, migraine, and some medicines.  Luckily, these causes are not life threatening and will eventually go away.  However, sometimes there is a serious problem that does not show up right away.  Generally, every Emergency Department visit should have a follow-up clinic visit with either a primary or a specialty clinic/provider. Please follow-up as instructed by your emergency provider today.  Return to the Emergency Department if you have:    New or severe headache.    Double vision (seeing two of things).    Trouble speaking or hearing.    Weakness or trouble moving/using one side of your body.    Passing out.    Numbness or tingling.    Chest pain.    Vomiting that will not stop.    Treatment:    There are several commonly prescribed medications:  o Antihistamines such as meclizine (Antivert ), dimenhydrinate (Dramamine ), or diphenhydramine (Benadryl ).  o Prescription anti-nausea medicines, such as promethazine (Phenergan ), metoclopramide (Reglan ), or ondansetron (Zofran ).  o Prescription sedative medicines, such as diazepam (Valium ), lorazepam (Ativan ), or clonazepam (Klonopin ).    Most of these medicines make you sleepy, and you should not take them before you work or drive. You should only take prescription medicines to treat severe vertigo symptoms, and you should stop the medicine when your  symptoms improve.    Follow Up:    If you have vertigo longer than three days, it is important that you follow up either with your primary provider or an Ear, Nose, and Throat (ENT) specialist.  You may need further testing to evaluate your vertigo and you may also need  vestibular  therapy which is a special form of physical therapy to make the vertigo go away.    If you were given a prescription for medicine here today, be sure to read all of the information (including the package insert) that comes with your prescription.  This will include important information about the medicine, its side effects, and any warnings that you need to know about.  The pharmacist who fills the prescription can provide more information and answer questions you may have about the medicine.  If you have questions or concerns that the pharmacist cannot address, please call or return to the Emergency Department.     Remember that you can always come back to the Emergency Department if you are not able to see your regular provider in the amount of time listed above, if you get any new symptoms, or if there is anything that worries you.

## 2021-02-08 ENCOUNTER — IMMUNIZATION (OUTPATIENT)
Dept: PEDIATRICS | Facility: CLINIC | Age: 86
End: 2021-02-08
Payer: COMMERCIAL

## 2021-02-08 PROCEDURE — 91300 PR COVID VAC PFIZER DIL RECON 30 MCG/0.3 ML IM: CPT

## 2021-02-08 PROCEDURE — 0001A PR COVID VAC PFIZER DIL RECON 30 MCG/0.3 ML IM: CPT

## 2021-03-01 ENCOUNTER — IMMUNIZATION (OUTPATIENT)
Dept: PEDIATRICS | Facility: CLINIC | Age: 86
End: 2021-03-01
Attending: INTERNAL MEDICINE
Payer: COMMERCIAL

## 2021-03-01 PROCEDURE — 91300 PR COVID VAC PFIZER DIL RECON 30 MCG/0.3 ML IM: CPT

## 2021-03-01 PROCEDURE — 0002A PR COVID VAC PFIZER DIL RECON 30 MCG/0.3 ML IM: CPT

## 2021-05-14 ENCOUNTER — APPOINTMENT (OUTPATIENT)
Dept: MRI IMAGING | Facility: CLINIC | Age: 86
End: 2021-05-14
Attending: EMERGENCY MEDICINE
Payer: COMMERCIAL

## 2021-05-14 ENCOUNTER — HOSPITAL ENCOUNTER (EMERGENCY)
Facility: CLINIC | Age: 86
Discharge: HOME | End: 2021-05-14
Attending: EMERGENCY MEDICINE | Admitting: EMERGENCY MEDICINE
Payer: COMMERCIAL

## 2021-05-14 VITALS
SYSTOLIC BLOOD PRESSURE: 136 MMHG | TEMPERATURE: 97.5 F | DIASTOLIC BLOOD PRESSURE: 72 MMHG | WEIGHT: 113 LBS | HEIGHT: 62 IN | BODY MASS INDEX: 20.8 KG/M2 | OXYGEN SATURATION: 99 % | HEART RATE: 66 BPM | RESPIRATION RATE: 16 BRPM

## 2021-05-14 DIAGNOSIS — R42 VERTIGO: ICD-10-CM

## 2021-05-14 LAB
ANION GAP SERPL CALCULATED.3IONS-SCNC: 5 MMOL/L (ref 3–14)
BASOPHILS # BLD AUTO: 0 10E9/L (ref 0–0.2)
BASOPHILS NFR BLD AUTO: 0.5 %
BUN SERPL-MCNC: 18 MG/DL (ref 7–30)
CALCIUM SERPL-MCNC: 8.6 MG/DL (ref 8.5–10.1)
CHLORIDE SERPL-SCNC: 109 MMOL/L (ref 94–109)
CO2 SERPL-SCNC: 28 MMOL/L (ref 20–32)
CREAT SERPL-MCNC: 0.96 MG/DL (ref 0.52–1.04)
DIFFERENTIAL METHOD BLD: ABNORMAL
EOSINOPHIL # BLD AUTO: 0.4 10E9/L (ref 0–0.7)
EOSINOPHIL NFR BLD AUTO: 4.1 %
ERYTHROCYTE [DISTWIDTH] IN BLOOD BY AUTOMATED COUNT: 15.5 % (ref 10–15)
GFR SERPL CREATININE-BSD FRML MDRD: 54 ML/MIN/{1.73_M2}
GLUCOSE SERPL-MCNC: 143 MG/DL (ref 70–99)
HCT VFR BLD AUTO: 37.9 % (ref 35–47)
HGB BLD-MCNC: 11.7 G/DL (ref 11.7–15.7)
IMM GRANULOCYTES # BLD: 0 10E9/L (ref 0–0.4)
IMM GRANULOCYTES NFR BLD: 0.3 %
INTERPRETATION ECG - MUSE: NORMAL
LYMPHOCYTES # BLD AUTO: 2.2 10E9/L (ref 0.8–5.3)
LYMPHOCYTES NFR BLD AUTO: 25 %
MCH RBC QN AUTO: 25.8 PG (ref 26.5–33)
MCHC RBC AUTO-ENTMCNC: 30.9 G/DL (ref 31.5–36.5)
MCV RBC AUTO: 84 FL (ref 78–100)
MONOCYTES # BLD AUTO: 0.5 10E9/L (ref 0–1.3)
MONOCYTES NFR BLD AUTO: 5.9 %
NEUTROPHILS # BLD AUTO: 5.6 10E9/L (ref 1.6–8.3)
NEUTROPHILS NFR BLD AUTO: 64.2 %
NRBC # BLD AUTO: 0 10*3/UL
NRBC BLD AUTO-RTO: 0 /100
PLATELET # BLD AUTO: 721 10E9/L (ref 150–450)
POTASSIUM SERPL-SCNC: 3.1 MMOL/L (ref 3.4–5.3)
RBC # BLD AUTO: 4.54 10E12/L (ref 3.8–5.2)
SODIUM SERPL-SCNC: 142 MMOL/L (ref 133–144)
WBC # BLD AUTO: 8.7 10E9/L (ref 4–11)

## 2021-05-14 PROCEDURE — 258N000003 HC RX IP 258 OP 636: Performed by: EMERGENCY MEDICINE

## 2021-05-14 PROCEDURE — 255N000002 HC RX 255 OP 636: Performed by: EMERGENCY MEDICINE

## 2021-05-14 PROCEDURE — 96374 THER/PROPH/DIAG INJ IV PUSH: CPT

## 2021-05-14 PROCEDURE — 99285 EMERGENCY DEPT VISIT HI MDM: CPT | Mod: 25

## 2021-05-14 PROCEDURE — 70544 MR ANGIOGRAPHY HEAD W/O DYE: CPT

## 2021-05-14 PROCEDURE — 250N000011 HC RX IP 250 OP 636: Performed by: EMERGENCY MEDICINE

## 2021-05-14 PROCEDURE — 96361 HYDRATE IV INFUSION ADD-ON: CPT

## 2021-05-14 PROCEDURE — 250N000013 HC RX MED GY IP 250 OP 250 PS 637: Performed by: EMERGENCY MEDICINE

## 2021-05-14 PROCEDURE — A9585 GADOBUTROL INJECTION: HCPCS | Performed by: EMERGENCY MEDICINE

## 2021-05-14 PROCEDURE — 93005 ELECTROCARDIOGRAM TRACING: CPT

## 2021-05-14 PROCEDURE — 85025 COMPLETE CBC W/AUTO DIFF WBC: CPT | Performed by: EMERGENCY MEDICINE

## 2021-05-14 PROCEDURE — 70553 MRI BRAIN STEM W/O & W/DYE: CPT

## 2021-05-14 PROCEDURE — 80048 BASIC METABOLIC PNL TOTAL CA: CPT | Performed by: EMERGENCY MEDICINE

## 2021-05-14 PROCEDURE — 96375 TX/PRO/DX INJ NEW DRUG ADDON: CPT | Mod: 59

## 2021-05-14 PROCEDURE — 70549 MR ANGIOGRAPH NECK W/O&W/DYE: CPT

## 2021-05-14 RX ORDER — LORAZEPAM 1 MG/1
1 TABLET ORAL ONCE
Status: COMPLETED | OUTPATIENT
Start: 2021-05-14 | End: 2021-05-14

## 2021-05-14 RX ORDER — MECLIZINE HYDROCHLORIDE 25 MG/1
50 TABLET ORAL ONCE
Status: COMPLETED | OUTPATIENT
Start: 2021-05-14 | End: 2021-05-14

## 2021-05-14 RX ORDER — ONDANSETRON 4 MG/1
4 TABLET, ORALLY DISINTEGRATING ORAL EVERY 6 HOURS PRN
Qty: 10 TABLET | Refills: 0 | Status: SHIPPED | OUTPATIENT
Start: 2021-05-14 | End: 2021-05-17

## 2021-05-14 RX ORDER — MECLIZINE HYDROCHLORIDE 25 MG/1
50 TABLET ORAL EVERY 6 HOURS PRN
Qty: 30 TABLET | Refills: 1 | Status: SHIPPED | OUTPATIENT
Start: 2021-05-14 | End: 2022-06-04

## 2021-05-14 RX ORDER — GADOBUTROL 604.72 MG/ML
10 INJECTION INTRAVENOUS ONCE
Status: COMPLETED | OUTPATIENT
Start: 2021-05-14 | End: 2021-05-14

## 2021-05-14 RX ORDER — ONDANSETRON 2 MG/ML
4 INJECTION INTRAMUSCULAR; INTRAVENOUS ONCE
Status: COMPLETED | OUTPATIENT
Start: 2021-05-14 | End: 2021-05-14

## 2021-05-14 RX ORDER — DIPHENHYDRAMINE HYDROCHLORIDE 50 MG/ML
25 INJECTION INTRAMUSCULAR; INTRAVENOUS ONCE
Status: COMPLETED | OUTPATIENT
Start: 2021-05-14 | End: 2021-05-14

## 2021-05-14 RX ADMIN — ONDANSETRON 4 MG: 2 INJECTION INTRAMUSCULAR; INTRAVENOUS at 09:59

## 2021-05-14 RX ADMIN — LORAZEPAM 1 MG: 1 TABLET ORAL at 13:37

## 2021-05-14 RX ADMIN — SODIUM CHLORIDE 1000 ML: 9 INJECTION, SOLUTION INTRAVENOUS at 09:52

## 2021-05-14 RX ADMIN — DIPHENHYDRAMINE HYDROCHLORIDE 25 MG: 50 INJECTION, SOLUTION INTRAMUSCULAR; INTRAVENOUS at 13:37

## 2021-05-14 RX ADMIN — GADOBUTROL 10 ML: 604.72 INJECTION INTRAVENOUS at 11:28

## 2021-05-14 RX ADMIN — MECLIZINE HYDROCHLORIDE 50 MG: 25 TABLET ORAL at 09:59

## 2021-05-14 ASSESSMENT — MIFFLIN-ST. JEOR: SCORE: 915.81

## 2021-05-14 NOTE — ED PROVIDER NOTES
"  History     Chief Complaint:  Dizziness      HPI   Mejia Ordaz is a 85 year old female who presents with vertigo.  For the past several days she has had some intermittent vertigo however it always resolved.  This morning she woke with severe vertigo and with any motion of her head has the onset of significant nausea and vomiting.  EMS state they were able to help her stand up, as when they found her in her house she was sitting on the floor, however note that she had an unstable gait.  Here she is unable to participate in significant parts of the history or physical due to severe vertigo.  She denies chest pain or shortness of breath.  The exception of head movement she is unable to identify other exacerbating or alleviating factors.    Review of Systems  Positive-vertigo, nausea, vomiting  Negative-chest pain, shortness of breath, headache, nuchal rigidity, fever  Remaining 10 point ROS negative    Allergies:  Penicillins  Scopolamine      Medications:    atorvastatin  meclizine   triamterene-hydrochlorothiazide     Past Medical History:    Arteritis    Atrophic vaginitis   Bell's palsy   Chronic steroid use   Chronic UTI   CKD (chronic kidney disease)  Hyperlipidemia   Hypertension   Microalbuminuria   Osteoporosis, unspecified  Trigeminal neuralgia      Past Surgical History:    hysterectomy   Excise lesion head  Balloon compression rhizotomy     Family History:    Cerebrovascular disease     Social History:  Patient presents by EMS.   PCP: Delmy Matias     Physical Exam     Patient Vitals for the past 24 hrs:   BP Temp Temp src Pulse Resp SpO2 Height Weight   05/14/21 1330 136/72 -- -- 66 -- 99 % -- --   05/14/21 1328 136/72 -- -- 66 -- -- -- --   05/14/21 1119 -- -- -- 72 -- 99 % -- --   05/14/21 0942 (!) 163/78 97.5  F (36.4  C) Oral 57 16 98 % 1.575 m (5' 2\") 51.3 kg (113 lb)       Physical Exam  General/Appearance: appears stated age, well-groomed, lying with eyes closed and appears very " uncomfortable  Eyes: EOMI, no scleral injection, no icterus  ENT: MMM  Neck: supple, nl ROM, no stiffness  Cardiovascular: RRR, nl S1S2, no m/r/g, 2+ pulses in all 4 extremities, cap refill <2sec  Respiratory: CTAB, good air movement throughout, no wheezes/rhonchi/rales, no increased WOB, no retractions  GI: abd soft, non-distended, nttp,  no HSM, no rebound, no guarding, nl BS  MSK: GRANDA, good tone, no bony abnormality  Skin: warm and well-perfused, no rash, no edema, no ecchymosis, nl turgor  Neuro: GCS 15, alert and oriented, unable to perform detailed neuro exam 2/2 severe vertigo  Psych: deferred  Heme: no petechia, no purpura, no active bleeding        Emergency Department Course   ECG:  ECG taken at 1007, ECG read at 1023  Sinus rhythm with 1st degree AV block  Otherwise normal ECG  Rate 62 bpm. MS interval 224 ms. QRS duration 86 ms. QT/QTc 474/481 ms. P-R-T axes 25 63 73.     Imaging:  MR Brain w/o & w Contrast  Preliminary Result  IMPRESSION:  1. Few scattered nonspecific white matter lesions.  2. No evidence for intracranial hemorrhage, acute infarct, or any  focal mass lesions.  Reading per radiology     MRA Neck (Carotids) wo & w Contrast  Preliminary Result  IMPRESSION:  1. 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  2. Normal carotid systems and right vertebral artery.  Reading per radiology     MRA Brain ([place]) wo Contrast  Preliminary Result  IMPRESSION: Negative MR angiography of the [place].  Reading per radiology     Laboratory:  CBC: WBC 8.7, HGB 11.7,  (H)      BMP: Glucose 143 (H), potassium 3.1 (L), GFR 54 (L), o/w WNL (Creatinine: 0.96)    Emergency Department Course:    Reviewed:  I reviewed nursing notes, vitals, past history and care everywhere    Assessments:  0942 I obtained history and examined the patient as noted above.   1326 I rechecked the patient and explained findings.   1513 Patient rechecked and updated.      Interventions:  0952 0.9% sodium chloride BOLUS 1000 mL IV   0959 zofran 4 mg IV   0959 Meclizine 50 mg oral   1337 Lorazepam, 1 mg, IV injection   1337 Benadryl 25 mg IV    Disposition:  Discharged.     Impression & Plan        Medical Decision Making:  Patient is a pleasant 86-year-old female who has had some intermittent vertigo for the past several days but today had severe vertigo associated with nausea and vomiting.  I initially was concerned for posterior circulation infarct so MRI/MRA was obtained.  These were unremarkable except for a 70% stenosis to one of her vertebral arteries.  I ran this finding by the on-call neurologist and the PA, Martine, who feel this is unremarkable and unlikely to be a culprit lesion for today's symptoms given her elevated blood pressure.  They suspect this is inner ear.  We will continue to treat it like its inner ear.  After meclizine, Ativan, Benadryl she felt well enough to be able to ambulate.  She still did not feel completely stable but felt well enough to be discharged home.  She will be sent home with meclizine and Zofran to take as needed.      Diagnosis:    ICD-10-CM    1. Vertigo  R42        Discharge Medications:  New Prescriptions    MECLIZINE (ANTIVERT) 25 MG TABLET    Take 2 tablets (50 mg) by mouth every 6 hours as needed for dizziness    ONDANSETRON (ZOFRAN ODT) 4 MG ODT TAB    Take 1 tablet (4 mg) by mouth every 6 hours as needed for nausea or vomiting         Scribe Disclosure:  I, Radha Tinoco MD, am serving as a scribe at 9:42 AM on 5/14/2021 to document services personally performed by Radha Tinoco MD based on my observations and the provider's statements to me.      Radha Tinoco MD  05/14/21 3029

## 2021-05-14 NOTE — ED NOTES
Purewick and brief placed. Patient seems willing to get up and go to the bathroom after placement. Writer explained how purewick works. She will try it out at this time before getting up to use the restroom due to dizziness.

## 2021-05-14 NOTE — ED NOTES
Patient ambulated down the hallway to the bathroom without dizziness or pain. States her current level of dizziness is a manageable level for at home. MD martin.

## 2021-05-14 NOTE — ED TRIAGE NOTES
Woke up with severe dizziness and could not get out of bed. Feeling head spinning with movement. Nausea vomiting en route. Zofran 4 mg IV given. Had intermittent dizziness on and off but was mild and goes away quick.

## 2021-05-14 NOTE — ED NOTES
Pt states she needs to urinate. EDT to assist. States dizziness continues. Daughter remains at bedside.

## 2021-05-14 NOTE — ED NOTES
Bed: ED09  Expected date:   Expected time:   Means of arrival:   Comments:  Allina Dizzy nausea vomitin 85 f

## 2021-06-15 NOTE — IP AVS SNAPSHOT
[facility] Observation Unit    [address] [place] [id number]    Phone:  [phone]                                       After Visit Summary   9/1/2018    Mejia Ordaz    MRN: [id number]           After Visit Summary Signature Page     I have received my discharge instructions, and my questions have been answered. I have discussed any challenges I see with this plan with the nurse or doctor.    ..........................................................................................................................................  Patient/Patient Representative Signature      ..........................................................................................................................................  Patient Representative Print Name and Relationship to Patient    ..................................................               ................................................  Date                                            Time    ..........................................................................................................................................  Reviewed by Signature/Title    ...................................................              ..............................................  Date                                                            Time          22EPIC Rev 08/18         History:  Jessika Hunt is a 61 y.o. female who presents for further discussion regarding right breast invasive ductal carcinoma and her upcoming bilateral mastectomy with immediate reconstruction.  She is ready to get surgery over with.    ASSESSMENT:  Jessika Hunt is a 61 y.o. female with right breast invasive ductal carcinoma, ER+, PA-, HER2-    PLAN:  Reviewed more technical details regarding bilateral mastectomy with reconstruction.  We reviewed drains, physical activities, along with pathology processing and potentially ordering Oncotype.  All of her questions were answered to satisfaction.  Will plan to proceed with bilateral skin-sparing mastectomy, right sentinel lymph node biopsy and immediate reconstruction on March 11.  She has already had a preoperative physical.  She will receive a preoperative phone call from [facility] before surgery for timing specifics.    Karla Darling DO  General Surgeon  [facility]  Breast [address] [address]  Office: [phone]  Employed by - [facility]

## 2022-06-04 ENCOUNTER — APPOINTMENT (OUTPATIENT)
Dept: GENERAL RADIOLOGY | Facility: CLINIC | Age: 87
End: 2022-06-04
Attending: EMERGENCY MEDICINE
Payer: COMMERCIAL

## 2022-06-04 ENCOUNTER — HOSPITAL ENCOUNTER (OUTPATIENT)
Facility: CLINIC | Age: 87
Setting detail: OBSERVATION
Discharge: HOME | End: 2022-06-06
Attending: EMERGENCY MEDICINE | Admitting: INTERNAL MEDICINE
Payer: COMMERCIAL

## 2022-06-04 DIAGNOSIS — M54.41 ACUTE RIGHT-SIDED LOW BACK PAIN WITH RIGHT-SIDED SCIATICA: ICD-10-CM

## 2022-06-04 DIAGNOSIS — M25.551 HIP PAIN, RIGHT: ICD-10-CM

## 2022-06-04 DIAGNOSIS — E87.1 HYPONATREMIA: ICD-10-CM

## 2022-06-04 LAB
ALBUMIN SERPL-MCNC: 3.4 G/DL (ref 3.4–5)
ALP SERPL-CCNC: 75 U/L (ref 40–150)
ALT SERPL W P-5'-P-CCNC: 25 U/L (ref 0–50)
ANION GAP SERPL CALCULATED.3IONS-SCNC: 7 MMOL/L (ref 3–14)
AST SERPL W P-5'-P-CCNC: 20 U/L (ref 0–45)
BASOPHILS # BLD AUTO: 0 10E3/UL (ref 0–0.2)
BASOPHILS NFR BLD AUTO: 0 %
BILIRUB SERPL-MCNC: 0.5 MG/DL (ref 0.2–1.3)
BUN SERPL-MCNC: 17 MG/DL (ref 7–30)
CALCIUM SERPL-MCNC: 8.2 MG/DL (ref 8.5–10.1)
CHLORIDE BLD-SCNC: 92 MMOL/L (ref 94–109)
CO2 SERPL-SCNC: 26 MMOL/L (ref 20–32)
CREAT SERPL-MCNC: 0.8 MG/DL (ref 0.52–1.04)
EOSINOPHIL # BLD AUTO: 0.2 10E3/UL (ref 0–0.7)
EOSINOPHIL NFR BLD AUTO: 2 %
ERYTHROCYTE [DISTWIDTH] IN BLOOD BY AUTOMATED COUNT: 18 % (ref 10–15)
GFR SERPL CREATININE-BSD FRML MDRD: 72 ML/MIN/1.73M2
GLUCOSE BLD-MCNC: 112 MG/DL (ref 70–99)
HCT VFR BLD AUTO: 36.6 % (ref 35–47)
HGB BLD-MCNC: 12.1 G/DL (ref 11.7–15.7)
HOLD SPECIMEN: NORMAL
IMM GRANULOCYTES # BLD: 0.1 10E3/UL
IMM GRANULOCYTES NFR BLD: 0 %
LYMPHOCYTES # BLD AUTO: 2.6 10E3/UL (ref 0.8–5.3)
LYMPHOCYTES NFR BLD AUTO: 22 %
MCH RBC QN AUTO: 29.3 PG (ref 26.5–33)
MCHC RBC AUTO-ENTMCNC: 33.1 G/DL (ref 31.5–36.5)
MCV RBC AUTO: 89 FL (ref 78–100)
MONOCYTES # BLD AUTO: 1 10E3/UL (ref 0–1.3)
MONOCYTES NFR BLD AUTO: 8 %
NEUTROPHILS # BLD AUTO: 8 10E3/UL (ref 1.6–8.3)
NEUTROPHILS NFR BLD AUTO: 68 %
NRBC # BLD AUTO: 0 10E3/UL
NRBC BLD AUTO-RTO: 0 /100
PLATELET # BLD AUTO: 568 10E3/UL (ref 150–450)
POTASSIUM BLD-SCNC: 4.2 MMOL/L (ref 3.4–5.3)
PROT SERPL-MCNC: 6.4 G/DL (ref 6.8–8.8)
RBC # BLD AUTO: 4.13 10E6/UL (ref 3.8–5.2)
SARS-COV-2 RNA RESP QL NAA+PROBE: NEGATIVE
SODIUM SERPL-SCNC: 125 MMOL/L (ref 133–144)
WBC # BLD AUTO: 11.8 10E3/UL (ref 4–11)

## 2022-06-04 PROCEDURE — 96361 HYDRATE IV INFUSION ADD-ON: CPT

## 2022-06-04 PROCEDURE — 250N000011 HC RX IP 250 OP 636

## 2022-06-04 PROCEDURE — 99285 EMERGENCY DEPT VISIT HI MDM: CPT | Mod: 25

## 2022-06-04 PROCEDURE — 80053 COMPREHEN METABOLIC PANEL: CPT | Performed by: EMERGENCY MEDICINE

## 2022-06-04 PROCEDURE — 250N000013 HC RX MED GY IP 250 OP 250 PS 637: Performed by: EMERGENCY MEDICINE

## 2022-06-04 PROCEDURE — 73523 X-RAY EXAM HIPS BI 5/> VIEWS: CPT

## 2022-06-04 PROCEDURE — 258N000003 HC RX IP 258 OP 636: Performed by: EMERGENCY MEDICINE

## 2022-06-04 PROCEDURE — 99220 PR INITIAL OBSERVATION CARE,LEVEL III: CPT | Performed by: INTERNAL MEDICINE

## 2022-06-04 PROCEDURE — U0005 INFEC AGEN DETEC AMPLI PROBE: HCPCS | Performed by: EMERGENCY MEDICINE

## 2022-06-04 PROCEDURE — G0378 HOSPITAL OBSERVATION PER HR: HCPCS

## 2022-06-04 PROCEDURE — 36415 COLL VENOUS BLD VENIPUNCTURE: CPT | Performed by: EMERGENCY MEDICINE

## 2022-06-04 PROCEDURE — 96374 THER/PROPH/DIAG INJ IV PUSH: CPT

## 2022-06-04 PROCEDURE — C9803 HOPD COVID-19 SPEC COLLECT: HCPCS

## 2022-06-04 PROCEDURE — 85004 AUTOMATED DIFF WBC COUNT: CPT | Performed by: EMERGENCY MEDICINE

## 2022-06-04 PROCEDURE — 250N000013 HC RX MED GY IP 250 OP 250 PS 637: Performed by: INTERNAL MEDICINE

## 2022-06-04 RX ORDER — HYDROMORPHONE HYDROCHLORIDE 2 MG/1
2 TABLET ORAL EVERY 4 HOURS PRN
Status: DISCONTINUED | OUTPATIENT
Start: 2022-06-04 | End: 2022-06-05

## 2022-06-04 RX ORDER — HYDROCODONE BITARTRATE AND ACETAMINOPHEN 5; 325 MG/1; MG/1
2 TABLET ORAL ONCE
Status: COMPLETED | OUTPATIENT
Start: 2022-06-04 | End: 2022-06-04

## 2022-06-04 RX ORDER — ASPIRIN 81 MG/1
81 TABLET ORAL DAILY
Status: DISCONTINUED | OUTPATIENT
Start: 2022-06-05 | End: 2022-06-06 | Stop reason: HOSPADM

## 2022-06-04 RX ORDER — ONDANSETRON 4 MG/1
4 TABLET, ORALLY DISINTEGRATING ORAL EVERY 6 HOURS PRN
Status: DISCONTINUED | OUTPATIENT
Start: 2022-06-04 | End: 2022-06-06 | Stop reason: HOSPADM

## 2022-06-04 RX ORDER — HYDROXYUREA 500 MG/1
500 CAPSULE ORAL DAILY
Status: DISCONTINUED | OUTPATIENT
Start: 2022-06-04 | End: 2022-06-06 | Stop reason: HOSPADM

## 2022-06-04 RX ORDER — NALOXONE HYDROCHLORIDE 0.4 MG/ML
0.4 INJECTION, SOLUTION INTRAMUSCULAR; INTRAVENOUS; SUBCUTANEOUS
Status: DISCONTINUED | OUTPATIENT
Start: 2022-06-04 | End: 2022-06-06 | Stop reason: HOSPADM

## 2022-06-04 RX ORDER — HYDROXYUREA 500 MG/1
CAPSULE ORAL DAILY
COMMUNITY
End: 2023-06-15

## 2022-06-04 RX ORDER — ONDANSETRON 2 MG/ML
INJECTION INTRAMUSCULAR; INTRAVENOUS
Status: COMPLETED
Start: 2022-06-04 | End: 2022-06-04

## 2022-06-04 RX ORDER — GABAPENTIN 100 MG/1
100 CAPSULE ORAL AT BEDTIME
Status: DISCONTINUED | OUTPATIENT
Start: 2022-06-04 | End: 2022-06-06 | Stop reason: HOSPADM

## 2022-06-04 RX ORDER — LORAZEPAM 2 MG/ML
0.5 INJECTION INTRAMUSCULAR ONCE
Status: DISCONTINUED | OUTPATIENT
Start: 2022-06-04 | End: 2022-06-05

## 2022-06-04 RX ORDER — ACETAMINOPHEN 325 MG/1
975 TABLET ORAL EVERY 8 HOURS
Status: DISCONTINUED | OUTPATIENT
Start: 2022-06-04 | End: 2022-06-06 | Stop reason: HOSPADM

## 2022-06-04 RX ORDER — CELECOXIB 200 MG/1
200 CAPSULE ORAL DAILY
COMMUNITY
End: 2023-06-07

## 2022-06-04 RX ORDER — PROCHLORPERAZINE MALEATE 5 MG
5 TABLET ORAL EVERY 6 HOURS PRN
Status: DISCONTINUED | OUTPATIENT
Start: 2022-06-04 | End: 2022-06-06 | Stop reason: HOSPADM

## 2022-06-04 RX ORDER — PROCHLORPERAZINE 25 MG
12.5 SUPPOSITORY, RECTAL RECTAL EVERY 12 HOURS PRN
Status: DISCONTINUED | OUTPATIENT
Start: 2022-06-04 | End: 2022-06-06 | Stop reason: HOSPADM

## 2022-06-04 RX ORDER — LANOLIN ALCOHOL/MO/W.PET/CERES
1 CREAM (GRAM) TOPICAL
COMMUNITY
End: 2023-06-07

## 2022-06-04 RX ORDER — CELECOXIB 200 MG/1
200 CAPSULE ORAL DAILY
Status: DISCONTINUED | OUTPATIENT
Start: 2022-06-05 | End: 2022-06-06 | Stop reason: HOSPADM

## 2022-06-04 RX ORDER — LISINOPRIL 10 MG/1
10 TABLET ORAL DAILY
COMMUNITY
End: 2023-06-07

## 2022-06-04 RX ORDER — NALOXONE HYDROCHLORIDE 0.4 MG/ML
0.2 INJECTION, SOLUTION INTRAMUSCULAR; INTRAVENOUS; SUBCUTANEOUS
Status: DISCONTINUED | OUTPATIENT
Start: 2022-06-04 | End: 2022-06-06 | Stop reason: HOSPADM

## 2022-06-04 RX ORDER — ACETAMINOPHEN 500 MG
1000 TABLET ORAL 3 TIMES DAILY
Status: DISCONTINUED | OUTPATIENT
Start: 2022-06-04 | End: 2022-06-04

## 2022-06-04 RX ORDER — ONDANSETRON 2 MG/ML
4 INJECTION INTRAMUSCULAR; INTRAVENOUS EVERY 6 HOURS PRN
Status: DISCONTINUED | OUTPATIENT
Start: 2022-06-04 | End: 2022-06-06 | Stop reason: HOSPADM

## 2022-06-04 RX ORDER — LISINOPRIL 10 MG/1
10 TABLET ORAL DAILY
Status: DISCONTINUED | OUTPATIENT
Start: 2022-06-04 | End: 2022-06-06 | Stop reason: HOSPADM

## 2022-06-04 RX ORDER — ONDANSETRON 2 MG/ML
4 INJECTION INTRAMUSCULAR; INTRAVENOUS ONCE
Status: COMPLETED | OUTPATIENT
Start: 2022-06-04 | End: 2022-06-04

## 2022-06-04 RX ORDER — ASPIRIN 81 MG/1
81 TABLET ORAL DAILY
Status: ON HOLD | COMMUNITY
End: 2022-11-28

## 2022-06-04 RX ADMIN — GABAPENTIN 100 MG: 100 CAPSULE ORAL at 20:37

## 2022-06-04 RX ADMIN — HYDROCODONE BITARTRATE AND ACETAMINOPHEN 2 TABLET: 5; 325 TABLET ORAL at 15:28

## 2022-06-04 RX ADMIN — LISINOPRIL 10 MG: 10 TABLET ORAL at 20:37

## 2022-06-04 RX ADMIN — HYDROXYUREA 500 MG: 500 CAPSULE ORAL at 20:37

## 2022-06-04 RX ADMIN — ONDANSETRON 4 MG: 2 INJECTION INTRAMUSCULAR; INTRAVENOUS at 16:46

## 2022-06-04 RX ADMIN — SODIUM CHLORIDE 1000 ML: 9 INJECTION, SOLUTION INTRAVENOUS at 16:47

## 2022-06-04 RX ADMIN — HYDROMORPHONE HYDROCHLORIDE 2 MG: 2 TABLET ORAL at 20:37

## 2022-06-04 ASSESSMENT — ENCOUNTER SYMPTOMS
NUMBNESS: 1
ROS GI COMMENTS: INCONTINENCE -
FEVER: 0
ARTHRALGIAS: 1
FREQUENCY: 0
MYALGIAS: 1
DYSURIA: 0

## 2022-06-04 NOTE — H&P
[facility]    History and Physical - Hospitalist Service       Date of Admission:  6/4/2022  Dictation #: [id number]  Brief Summary (see dictation for more details): 86F hx Htn, osteoporosis, trigeminal neuralgia presents with progressive right hip pain with radiation down the leg.  MRI on 5/22 was unrevealing.  Needs better pain control and will ask for ortho opinion.  Trial low dose gabapentin at bedtime.  Repeat BMP in the morning after getting 1L NS for hyponatremia.     Clinically Significant Risk Factors Present on Admission         # Hyponatremia: Na = 125 mmol/L (Ref range: 133 - 144 mmol/L) on admission, will monitor as appropriate  # Hypocalcemia: Ca = 8.2 mg/dL (Ref range: 8.5 - 10.1 mg/dL) and/or iCa = N/A on admission, will replace as needed               Tohmas Lockwood, DO  Hospitalist Service  [facility]  Securely message with the Vocera Web Console (learn more here)  Text page via [employer] Paging/Directory

## 2022-06-04 NOTE — PROGRESS NOTES
RECEIVING UNIT ED HANDOFF REVIEW    ED Nurse Handoff Report was reviewed by: Ana Ortiz RN on June 4, 2022 at 6:09 PM

## 2022-06-04 NOTE — ED PROVIDER NOTES
History     Chief Complaint:  Leg Pain and Numbness     HPI:  The history is provided by the patient and a relative.      Mejia Ordaz is a 86 year old female with a history of osteoporosis, osteoarthritis, hypertension, hyperlipidemia who presents with right-sided leg/hip/buttock pain which has been gradually worsening over the past few months. The pain is described as achy, sharp, and burning which radiates down her right leg with associated paresthesia in her right upper leg. She reports that over the past week, her pain began to worsen as it began spreading to her left hip/buttock. She has not been able to walk without significant pain and has not been sleeping well. Prior to the worsening of her pain, she did undergo MRI imaging at [facility]. See below HPI for MRI conclusion. She has been taking Tylenol 3 \without much relief. She was also recently prescribed a course of prednisone which she has since finished, and she reports that this actually made her pain worse. Mejia denies any recent falls or trauma. No fever, incontinence of urine or stool, groin numbness, urinary urgency, frequency, or dysuria. She does not regularly follow with orthopedics for this issue. She lives alone.    MR Lumbar Spine w/o Contrast ([facility]; 5/22/22):  Multilevel lumbar degenerative changes with specifics as follows:  1. Mild subarticular stenosis bilaterally at L4-5 and on the right at L3-4 without impingement.  2. Foraminal stenosis, mild on the right at L5-S1, left greater than right at L4-5, and on the right at L3-4 without impingement.  3. Facet degeneration, moderate bilaterally at L5-S1. Mild bilaterally and on the left at T11-12.  4. No spondylosis, vertebral collapse, acute fracture, or destructive osseous lesion.  5. L3-4 mild degenerative endplate disruption/edema is another potential source of axial/mechanical low back pain.  6. Tortuous, mildly dilated left gonadal vein is nonspecific and has been described in the setting of  "pelvic congestion syndrome; this may be correlated clinically.    Review of Systems   Constitutional: Negative for fever.   Gastrointestinal:        Incontinence -   Genitourinary: Negative for dysuria, frequency and urgency.        Incontinence -   Musculoskeletal: Positive for arthralgias and myalgias.   Neurological: Positive for numbness (right leg; no groin numbness).   All other systems reviewed and are negative.    Allergies:  Hydrochlorothiazide  Penicillins  Scopolamine    Medications:  Lipitor  Meclizine  Maxzide-25  Celebrex  Lisinopril    Past Medical History:     Myeloproliferative disease  Primary osteoarthritis  Qualitative platelet defects    Giant cell arteritis  Essential thrombocythemia    Atrophic vaginitis   Bell's palsy  Chronic steroid use   Chronic UTI   CKD, stage 2   Hyperlipidemia   Hypertension  Osteoporosis  PONV  Trigeminal neuralgia   Vertigo   Preauricular cyst  GERD  Hyponatremia  Anemia     Past Surgical History:    Balloon compression rhizotomy  Colonoscopy  Head lesion excision, left  Esophagoscopy  Total abdominal hysterectomy, bilateral salpingo-oophorectomy     Family History:    Father: cerebrovascular disease     Social History:  The patient presents to the ED with her daughter, Shameka.  The patient presents to the ED via car.   The patient lives alone.    Physical Exam     Patient Vitals for the past 24 hrs:   BP Temp Temp src Pulse Resp SpO2 Height Weight   06/04/22 1900 -- -- -- -- -- -- 1.575 m (5' 2\") 46.8 kg (103 lb 3.2 oz)   06/04/22 1838 (!) 149/76 97.4  F (36.3  C) Oral 60 16 98 % -- --   06/04/22 1645 (!) 148/76 -- -- 69 -- 95 % -- --   06/04/22 1500 108/60 -- -- -- -- 96 % -- --   06/04/22 1458 108/60 -- -- -- -- -- -- --   06/04/22 1449 -- -- -- 85 -- 96 % -- --   06/04/22 1431 -- 98.4  F (36.9  C) Temporal -- -- -- -- --   06/04/22 1430 99/53 -- -- 79 15 97 % -- --     Physical Exam  General: Patient in moderate distress.  Alert and cooperative with exam. Normal " mentation  HEENT: NC/AT. Conjunctiva without injection or scleral icterus. External ears normal.  Respiratory: Breathing comfortably on room air  CV: Normal rate, all extremities well perfused  GI:  Non-distended abdomen  Skin: Warm, dry, no rashes/open wounds on exposed skin  Musculoskeletal: No obvious deformities. She has tenderness to her right posterior hip with pain with active/passive ROM. CMS intact to lower extremities. No swelling.  Neuro: Alert, answers questions appropriately. No gross motor deficits    Emergency Department Course     Imaging:  XR Pelvis and Hip Bilateral 2 Views   Final Result   IMPRESSION: Advanced bilateral degenerative changes with complete loss of joint space superiorly and prominent subchondral cyst formation. No acute fracture.         Report per radiology    Laboratory:  Labs Ordered and Resulted from Time of ED Arrival to Time of ED Departure   COMPREHENSIVE METABOLIC PANEL - Abnormal       Result Value    Sodium 125 (*)     Potassium 4.2      Chloride 92 (*)     Carbon Dioxide (CO2) 26      Anion Gap 7      Urea Nitrogen 17      Creatinine 0.80      Calcium 8.2 (*)     Glucose 112 (*)     Alkaline Phosphatase 75      AST 20      ALT 25      Protein Total 6.4 (*)     Albumin 3.4      Bilirubin Total 0.5      GFR Estimate 72     CBC WITH PLATELETS AND DIFFERENTIAL - Abnormal    WBC Count 11.8 (*)     RBC Count 4.13      Hemoglobin 12.1      Hematocrit 36.6      MCV 89      MCH 29.3      MCHC 33.1      RDW 18.0 (*)     Platelet Count 568 (*)     % Neutrophils 68      % Lymphocytes 22      % Monocytes 8      % Eosinophils 2      % Basophils 0      % Immature Granulocytes 0      NRBCs per 100 WBC 0      Absolute Neutrophils 8.0      Absolute Lymphocytes 2.6      Absolute Monocytes 1.0      Absolute Eosinophils 0.2      Absolute Basophils 0.0      Absolute Immature Granulocytes 0.1      Absolute NRBCs 0.0        Emergency Department Course:       Reviewed:  I reviewed nursing notes,  vitals, past medical history and Care Everywhere    Assessments:  1457 I obtained history and examined the patient as noted above.   1636 I rechecked the patient and explained findings.     Consults:  1651 I spoke with Dr. Lockwood from the hospitalist service regarding the patient's presentation, findings here in the ED, and plan of care.     Interventions:  1528 Norco 5-325 mg 2 tablet PO  1646 Zofran 4 mg IV  1647 NS 1 L IV    Disposition:  The patient was admitted to the hospital under the care of Dr. Lockwood.     Impression & Plan     Medical Decision Making:  Mejia Ordaz is a 86 year old female who presents to the emergency department for evaluation of low back and bilateral hip pain, right greater than left.  Patient's medical history and records were reviewed.  On evaluation patient has normal strength and sensation in her lower extremities and is without evidence of cauda equina or other emergent pathology.  Recent MRI of her lumbar spine demonstrates multilevel degenerative changes and x-ray today shows advanced bilateral degenerative changes in the hips bilaterally with complete loss of joint space superiorly and prominent subchondral cyst formation; all of this likely contributing to her lower back/leg pain.  Patient was provided [place] in the ED and 30 minutes later an episode of vomiting with continued pain and inability to ambulate unassisted.  Given need for ongoing pain control and inability to safely discharge, will admit to observation with the hospitalist service for further evaluation and care.  Basic labs notable for mild hyponatremia (sodium 125; provided 1 L normal saline).    Diagnosis:    ICD-10-CM    1. Acute right-sided low back pain with right-sided sciatica  M54.41    2. Hip pain, right  M25.551    3. Hyponatremia  E87.1      Scribe Disclosure:  LENI, Kathya Ortiz, am serving as a scribe at 2:55 PM on 6/4/2022 to document services personally performed by Chaparro Milton DO based on my  observations and the provider's statements to me.      Chaparro Milton, DO  06/05/22 0021

## 2022-06-04 NOTE — PHARMACY-ADMISSION MEDICATION HISTORY
Pharmacy Medication History  Admission medication history interview status for the 6/4/2022  admission is complete. See EPIC admission navigator for prior to admission medications     Location of Interview: Patient room  Medication history sources: Surescripts, patient and daughter    Significant changes made to the medication list:  Changed PRN tylenol to scheduled  Removed: Meclizine, prednisone (just finished a burst), triamterene/HCTZ  Aded: asa, melatonin, celebrex, hydrea (sees Dr Leary) & lisinopril    In the past week, patient estimated taking medication this percent of the time: greater than 90%    Additional medication history information:   none    Medication reconciliation completed by provider prior to medication history? No    Time spent in this activity: 20 mins    Prior to Admission medications    Medication Sig Last Dose Taking? Auth Provider   acetaminophen (TYLENOL) 500 MG tablet Take 1,000 mg by mouth 3 times daily 6/4/2022 at x1 Yes Reported, Patient   aspirin 81 MG EC tablet Take 81 mg by mouth daily 6/3/2022 at Unknown time Yes Unknown, Entered By History   atorvastatin (LIPITOR) 10 MG tablet Take 1 tablet (10 mg) by mouth daily 6/3/2022 at Unknown time Yes Jeet Ortiz MD   celecoxib (CELEBREX) 200 MG capsule Take 200 mg by mouth daily 6/3/2022 at Unknown time Yes Unknown, Entered By History   hydroxyurea (HYDREA) 500 MG capsule Take by mouth daily 6/3/2022 at Unknown time Yes Unknown, Entered By History   lisinopril (ZESTRIL) 10 MG tablet Take 10 mg by mouth daily 6/3/2022 at Unknown time Yes Unknown, Entered By History   melatonin 3 MG tablet Take 1 mg by mouth nightly as needed for sleep Past Week at Unknown time Yes Unknown, Entered By History     Day Morrissey, PharmD     The information provided in this note is only as accurate as the sources available at the time of update(s)

## 2022-06-04 NOTE — ED NOTES
[facility]  ED Nurse Handoff Report    ED Chief complaint: Leg Pain and Numbness      ED Diagnosis:   Final diagnoses:   Acute right-sided low back pain with right-sided sciatica   Hip pain, right   Hyponatremia       Code Status: Full Code    Allergies:   Allergies   Allergen Reactions     Hydrochlorothiazide      Other reaction(s): Hyponatremia     Penicillins Itching     Scopolamine Nausea     Nausea for two weeks after patch was removed.       Patient Story: Pt lives at home alone, has had worsening back pain to the point pt is unable to walk.  Focused Assessment:  A/Ox4, can make needs known. Responds well to daughter in conversation but can be withdrawn at times. Pt is on RA. Denies chest pain. Complains of back and leg pain and unable to get up and walk in ED. Will admit for OBS.    Treatments and/or interventions provided: IV labs, bolus, meds, XRAY  Patient's response to treatments and/or interventions: Fair    To be done/followed up on inpatient unit:  Monitor    Does this patient have any cognitive concerns?: None    Activity level - Baseline/Home:  Cane and Walker  Activity Level - Current:   Total Care    Patient's Preferred language: English   Needed?: No    Isolation: None  Infection: Not Applicable  Patient tested for COVID 19 prior to admission: YES  Bariatric?: No    Vital Signs:   Vitals:    06/04/22 1449 06/04/22 1458 06/04/22 1500 06/04/22 1645   BP:  108/60 108/60 (!) 148/76   Pulse: 85   69   Resp:       Temp:       TempSrc:       SpO2: 96%  96% 95%       Cardiac Rhythm:     Was the PSS-3 completed:   Yes  What interventions are required if any?               Family Comments: Daughter at bedside  OBS brochure/video discussed/provided to patient/family: Yes              Name of person given brochure if not patient: NA              Relationship to patient: NA    For the majority of the shift this patient's behavior was Green.   Behavioral interventions performed were  None.    ED NURSE PHONE NUMBER: [phone]     \

## 2022-06-04 NOTE — ED TRIAGE NOTES
Pt lives alone, brought to ED by daughter. Patient reports severe bi lateral leg pain and numbness. Just finished a coarse of prednisone. Had MRI of back on 5/23. Pt unable to walk due to pain. Denies loss of control of bowel or bladder.     Triage Assessment     Row Name 06/04/22 1855       Triage Assessment (Adult)    Airway WDL WDL       Respiratory WDL    Respiratory WDL WDL       Skin Circulation/Temperature WDL    Skin Circulation/Temperature WDL WDL       Cardiac WDL    Cardiac WDL WDL       Peripheral/Neurovascular WDL    Peripheral Neurovascular WDL WDL       Cognitive/Neuro/Behavioral WDL    Cognitive/Neuro/Behavioral WDL WDL

## 2022-06-05 ENCOUNTER — APPOINTMENT (OUTPATIENT)
Dept: PHYSICAL THERAPY | Facility: CLINIC | Age: 87
End: 2022-06-05
Attending: INTERNAL MEDICINE
Payer: COMMERCIAL

## 2022-06-05 ENCOUNTER — APPOINTMENT (OUTPATIENT)
Dept: GENERAL RADIOLOGY | Facility: CLINIC | Age: 87
End: 2022-06-05
Attending: HOSPITALIST
Payer: COMMERCIAL

## 2022-06-05 LAB
ALBUMIN UR-MCNC: NEGATIVE MG/DL
ANION GAP SERPL CALCULATED.3IONS-SCNC: 7 MMOL/L (ref 3–14)
APPEARANCE UR: CLEAR
BILIRUB UR QL STRIP: NEGATIVE
BUN SERPL-MCNC: 12 MG/DL (ref 7–30)
CALCIUM SERPL-MCNC: 8.1 MG/DL (ref 8.5–10.1)
CHLORIDE BLD-SCNC: 93 MMOL/L (ref 94–109)
CK SERPL-CCNC: 40 U/L (ref 30–225)
CO2 SERPL-SCNC: 25 MMOL/L (ref 20–32)
COLOR UR AUTO: NORMAL
CREAT SERPL-MCNC: 0.67 MG/DL (ref 0.52–1.04)
CRP SERPL-MCNC: 6.5 MG/L (ref 0–8)
ERYTHROCYTE [DISTWIDTH] IN BLOOD BY AUTOMATED COUNT: 17.7 % (ref 10–15)
GFR SERPL CREATININE-BSD FRML MDRD: 85 ML/MIN/1.73M2
GLUCOSE BLD-MCNC: 103 MG/DL (ref 70–99)
GLUCOSE UR STRIP-MCNC: NEGATIVE MG/DL
HBA1C MFR BLD: 6 % (ref 0–5.6)
HCT VFR BLD AUTO: 35.4 % (ref 35–47)
HGB BLD-MCNC: 11.8 G/DL (ref 11.7–15.7)
HGB UR QL STRIP: NEGATIVE
KETONES UR STRIP-MCNC: NEGATIVE MG/DL
LEUKOCYTE ESTERASE UR QL STRIP: NEGATIVE
MCH RBC QN AUTO: 28.9 PG (ref 26.5–33)
MCHC RBC AUTO-ENTMCNC: 33.3 G/DL (ref 31.5–36.5)
MCV RBC AUTO: 87 FL (ref 78–100)
NITRATE UR QL: NEGATIVE
NT-PROBNP SERPL-MCNC: 235 PG/ML (ref 0–1800)
OSMOLALITY SERPL: 260 MMOL/KG (ref 280–301)
OSMOLALITY UR: 138 MMOL/KG (ref 100–1200)
PH UR STRIP: 6.5 [PH] (ref 5–7)
PLATELET # BLD AUTO: 530 10E3/UL (ref 150–450)
POTASSIUM BLD-SCNC: 4.1 MMOL/L (ref 3.4–5.3)
PROCALCITONIN SERPL-MCNC: <0.05 NG/ML
RBC # BLD AUTO: 4.08 10E6/UL (ref 3.8–5.2)
SODIUM SERPL-SCNC: 125 MMOL/L (ref 133–144)
SODIUM SERPL-SCNC: 130 MMOL/L (ref 133–144)
SODIUM UR-SCNC: 15 MMOL/L
SP GR UR STRIP: 1 (ref 1–1.03)
TSH SERPL DL<=0.005 MIU/L-ACNC: 0.62 MU/L (ref 0.4–4)
UROBILINOGEN UR STRIP-MCNC: NORMAL MG/DL
WBC # BLD AUTO: 16.6 10E3/UL (ref 4–11)

## 2022-06-05 PROCEDURE — 86140 C-REACTIVE PROTEIN: CPT | Performed by: HOSPITALIST

## 2022-06-05 PROCEDURE — 83930 ASSAY OF BLOOD OSMOLALITY: CPT | Performed by: PHYSICIAN ASSISTANT

## 2022-06-05 PROCEDURE — 99226 PR SUBSEQUENT OBSERVATION CARE,LEVEL III: CPT | Performed by: HOSPITALIST

## 2022-06-05 PROCEDURE — 250N000013 HC RX MED GY IP 250 OP 250 PS 637: Performed by: INTERNAL MEDICINE

## 2022-06-05 PROCEDURE — 71046 X-RAY EXAM CHEST 2 VIEWS: CPT

## 2022-06-05 PROCEDURE — 97161 PT EVAL LOW COMPLEX 20 MIN: CPT | Mod: GP

## 2022-06-05 PROCEDURE — 85027 COMPLETE CBC AUTOMATED: CPT | Performed by: INTERNAL MEDICINE

## 2022-06-05 PROCEDURE — 36415 COLL VENOUS BLD VENIPUNCTURE: CPT | Performed by: HOSPITALIST

## 2022-06-05 PROCEDURE — 80048 BASIC METABOLIC PNL TOTAL CA: CPT | Performed by: INTERNAL MEDICINE

## 2022-06-05 PROCEDURE — 84443 ASSAY THYROID STIM HORMONE: CPT | Performed by: HOSPITALIST

## 2022-06-05 PROCEDURE — 83935 ASSAY OF URINE OSMOLALITY: CPT | Performed by: PHYSICIAN ASSISTANT

## 2022-06-05 PROCEDURE — 84145 PROCALCITONIN (PCT): CPT | Performed by: HOSPITALIST

## 2022-06-05 PROCEDURE — 97530 THERAPEUTIC ACTIVITIES: CPT | Mod: GP

## 2022-06-05 PROCEDURE — 250N000013 HC RX MED GY IP 250 OP 250 PS 637: Performed by: PHYSICIAN ASSISTANT

## 2022-06-05 PROCEDURE — 84295 ASSAY OF SERUM SODIUM: CPT | Mod: 91 | Performed by: HOSPITALIST

## 2022-06-05 PROCEDURE — 83036 HEMOGLOBIN GLYCOSYLATED A1C: CPT | Performed by: HOSPITALIST

## 2022-06-05 PROCEDURE — 83880 ASSAY OF NATRIURETIC PEPTIDE: CPT | Mod: GZ | Performed by: HOSPITALIST

## 2022-06-05 PROCEDURE — 36415 COLL VENOUS BLD VENIPUNCTURE: CPT | Performed by: INTERNAL MEDICINE

## 2022-06-05 PROCEDURE — G0378 HOSPITAL OBSERVATION PER HR: HCPCS

## 2022-06-05 PROCEDURE — 82550 ASSAY OF CK (CPK): CPT | Performed by: HOSPITALIST

## 2022-06-05 PROCEDURE — 81003 URINALYSIS AUTO W/O SCOPE: CPT | Performed by: HOSPITALIST

## 2022-06-05 PROCEDURE — 258N000003 HC RX IP 258 OP 636: Performed by: HOSPITALIST

## 2022-06-05 PROCEDURE — 84300 ASSAY OF URINE SODIUM: CPT | Performed by: PHYSICIAN ASSISTANT

## 2022-06-05 RX ORDER — SODIUM CHLORIDE 9 MG/ML
INJECTION, SOLUTION INTRAVENOUS CONTINUOUS
Status: ACTIVE | OUTPATIENT
Start: 2022-06-05 | End: 2022-06-05

## 2022-06-05 RX ORDER — LIDOCAINE 4 G/G
1 PATCH TOPICAL
Status: DISCONTINUED | OUTPATIENT
Start: 2022-06-05 | End: 2022-06-06 | Stop reason: HOSPADM

## 2022-06-05 RX ORDER — CYCLOBENZAPRINE HYDROCHLORIDE 5 MG/1
2.5 TABLET, FILM COATED ORAL 3 TIMES DAILY
Status: DISCONTINUED | OUTPATIENT
Start: 2022-06-05 | End: 2022-06-06 | Stop reason: HOSPADM

## 2022-06-05 RX ADMIN — ACETAMINOPHEN 975 MG: 325 TABLET ORAL at 14:17

## 2022-06-05 RX ADMIN — LISINOPRIL 10 MG: 10 TABLET ORAL at 09:24

## 2022-06-05 RX ADMIN — CYCLOBENZAPRINE HYDROCHLORIDE 2.5 MG: 5 TABLET, FILM COATED ORAL at 14:17

## 2022-06-05 RX ADMIN — LIDOCAINE 1 PATCH: 560 PATCH PERCUTANEOUS; TOPICAL; TRANSDERMAL at 09:24

## 2022-06-05 RX ADMIN — CYCLOBENZAPRINE HYDROCHLORIDE 2.5 MG: 5 TABLET, FILM COATED ORAL at 21:01

## 2022-06-05 RX ADMIN — CELECOXIB 200 MG: 200 CAPSULE ORAL at 09:24

## 2022-06-05 RX ADMIN — ASPIRIN 81 MG: 81 TABLET, COATED ORAL at 09:24

## 2022-06-05 RX ADMIN — HYDROXYUREA 500 MG: 500 CAPSULE ORAL at 09:24

## 2022-06-05 RX ADMIN — Medication 1 MG: at 22:12

## 2022-06-05 RX ADMIN — ACETAMINOPHEN 975 MG: 325 TABLET ORAL at 06:30

## 2022-06-05 RX ADMIN — GABAPENTIN 100 MG: 100 CAPSULE ORAL at 22:12

## 2022-06-05 RX ADMIN — CYCLOBENZAPRINE HYDROCHLORIDE 2.5 MG: 5 TABLET, FILM COATED ORAL at 09:53

## 2022-06-05 RX ADMIN — ACETAMINOPHEN 975 MG: 325 TABLET ORAL at 22:12

## 2022-06-05 RX ADMIN — SODIUM CHLORIDE: 9 INJECTION, SOLUTION INTRAVENOUS at 10:23

## 2022-06-05 NOTE — PROGRESS NOTES
Observation goals  PRIOR TO DISCHARGE       Comments: Pain improved: not met   able to ambulate: met (causes pain)   ortho consult complete: not met   safe dispo arranged: not met   Nurse to notify provider when observation goals have been met and patient is ready for discharge.

## 2022-06-05 NOTE — PLAN OF CARE
Goal Outcome Evaluation:        Orientation/Cognitive: AOx4  Observation Goals (Met/ Not Met): not met  Mobility Level/Assist Equipment: SBA  Fall Risk (Y/N): Y  Behavior Concerns: none  Pain Management: pain with movement, none at rest. PRN dilaudid x1, scheduled gabapentin   Tele/VS/O2: VSS on RA  ABNL Lab/BG: , WBC 11.8  Diet: Regular  Bowel/Bladder: continent  Skin Concerns: none  Drains/Devices: PIV SL  Tests/Procedures for next shift: ortho surg to consult  Anticipated DC date & active delays: TBD pending improvement and plan  Patient Stated Goal for Today: pain control

## 2022-06-05 NOTE — PROGRESS NOTES
06/05/22 1400   Quick Adds   Type of Visit Initial PT Evaluation   Living Environment   People in Home alone   Current Living Arrangements house   Home Accessibility stairs to enter home;stairs within home   Number of Stairs, Main Entrance 3   Stair Railings, Main Entrance none   Number of Stairs, Within Home, Primary greater than 10 stairs   Stair Railings, Within Home, Primary railings safe and in good condition   Transportation Anticipated family or friend will provide   Living Environment Comments Pt lives alone in house with 3 MAKAYLA. Pt reports bedroom and walk in shower are upstairs, but she has been talking with her daughter about moving all needs to main floor. Tub shower main floor. Pt has 2 daughters who can assist intermittently.   Self-Care   Usual Activity Tolerance good   Current Activity Tolerance fair   Regular Exercise No   Equipment Currently Used at Home none   Fall history within last six months no   Activity/Exercise/Self-Care Comment Pt reports IND with all ADLs/IADLs and mobility without AD at baseline. Reports had been getting more difficult over last few weeks, using furniture to support self.   General Information   Onset of Illness/Injury or Date of Surgery 06/04/22   Referring Physician Thomas Lockwood, DO   Patient/Family Therapy Goals Statement (PT) return home   Pertinent History of Current Problem (include personal factors and/or comorbidities that impact the POC) Patient is 85 yo female with hx Htn, osteoporosis, trigeminal neuralgia presents with progressive right hip pain with radiation down the leg. Imaging indicates end stage hip arthritis R > L.   Existing Precautions/Restrictions fall   Weight-Bearing Status - LLE full weight-bearing   Weight-Bearing Status - RLE full weight-bearing   Cognition   Affect/Mental Status (Cognition) WNL   Orientation Status (Cognition) oriented x 3   Follows Commands (Cognition) WNL   Pain Assessment   Patient Currently in Pain Yes, see Vital Sign  flowsheet  (reporting 9-10/10 hip pain)   Integumentary/Edema   Integumentary/Edema no deficits were identifed   Posture    Posture Forward head position;Protracted shoulders   Range of Motion (ROM)   Range of Motion ROM deficits secondary to pain   ROM Comment R knee/hip ROM limited d/t pain   Strength (Manual Muscle Testing)   Strength (Manual Muscle Testing) Able to perform R SLR;Able to perform L SLR   Strength Comments at least 3+/5 with functional mobility   Bed Mobility   Comment, (Bed Mobility) supine to sit with use of bedrails and SBA, slow to complete   Transfers   Comment, (Transfers) STS with SBA   Gait/Stairs (Locomotion)   Comment, (Gait/Stairs) ambulation with FWW and SBA   Balance   Balance Comments unable to handle challenge in standing, requiring AD d/t pain   Clinical Impression   Criteria for Skilled Therapeutic Intervention Yes, treatment indicated   PT Diagnosis (PT) impaired functional mobility   Influenced by the following impairments pain, impaired gait   Functional limitations due to impairments difficulty with bed mobiliy, transfers, ambulation, stairs   Clinical Presentation (PT Evaluation Complexity) Stable/Uncomplicated   Clinical Presentation Rationale clinical judgement   Clinical Decision Making (Complexity) low complexity   Planned Therapy Interventions (PT) balance training;bed mobility training;gait training;home exercise program;patient/family education;ROM (range of motion);strengthening;stair training;transfer training;stretching;progressive activity/exercise   Anticipated Equipment Needs at Discharge (PT) walker, rolling   Risk & Benefits of therapy have been explained evaluation/treatment results reviewed;care plan/treatment goals reviewed;risks/benefits reviewed;current/potential barriers reviewed;participants voiced agreement with care plan;participants included;patient   PT Discharge Planning   PT Discharge Recommendation (DC Rec) home with assist;home with outpatient  physical therapy   PT Rationale for DC Rec Patient is currently below baseline functional mobility. Patient lives alone and is independent with all ADLs/IADLs and mobility without AD at baseline. Patient is currently limited due to severe bilateral hip pain, right > left. Anticipate that patient will be safe to discharge home with assist and appropriate assistive device for safety with functional mobility; have yet to assess stairs.   PT Brief overview of current status SBA bed mobility, STS, limited amb with FWW   Total Evaluation Time   Total Evaluation Time (Minutes) 10   Physical Therapy Goals   PT Frequency Daily   PT Predicted Duration/Target Date for Goal Attainment 06/07/22   PT Goals Bed Mobility;Transfers;Gait;Stairs   PT: Bed Mobility Independent;Supine to/from sit   PT: Transfers Modified independent;Sit to/from stand;Assistive device   PT: Gait Modified independent;Rolling walker;150 feet   PT: Stairs Modified independent;3 stairs;Rail on right

## 2022-06-05 NOTE — PROGRESS NOTES
[facility]    Orthopedics Consultation    Date of Admission:  6/4/2022    Assessment & Plan     1. 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  This is not a procedure that is done acutely in the inpatient setting for arthritis.  I recommend her pain be managed symptomatically.  I would not recommend a steroid injection as she has had this in the past and did not improve and would also prevent surgery in the near future.  She should be seen as an outpatient for consideration of hip replacement.    Active Problems:    Hip pain, right    Acute right-sided low back pain with right-sided sciatica    Fernando Caruso MD     Code Status    Full Code    Reason for Consult   Reason for consult: I was asked by Dr. Lockwood to evaluate this patient for bilateral hip pain.    Primary Care Physician   Physician No Ref-Primary    Chief Complaint   Bilateral hip pain    History is obtained from the patient    History of Present Illness   Mejia Ordaz is a 86 year old female who presents with bilateral hip pain.  She states her right hip pain has been present for years but has worsened lately.  Her left hip was not painful but lately has been more more painful.  She presented to the hospital for worsening pain control and difficulty mobilizing.  She denies any symptoms aside from bilateral hip pain.  She localizes her pain in the groin and about the hip with some radiation down the anterior aspect of the right thigh.  She denies any numbness or weakness in her feet.    Past Medical History   I have reviewed this patient's medical history and updated it with pertinent information if needed.   Past Medical History:   Diagnosis Date     Arteritis (H)     CHRONIC     Atrophic vaginitis 11/13/2012     Bell's palsy     rt facial pain controlled by Tegretal     Chronic steroid use  8/19/2015     Chronic UTI 11/13/2012     CKD (chronic kidney disease) stage 2, GFR 60-89 ml/min 6/2/2012     Headache(784.0)      Hyperlipidemia LDL goal <100 6/2/2012     Hypertension goal BP (blood pressure) < 140/90 9/27/2011     Microalbuminuria 11/13/2012     Mixed hyperlipidemia 11/13/2012     Need for prophylactic hormone replacement therapy (postmenopausal)      Osteoporosis, unspecified 2/03    on HRT, calcium and Vit.     Other and unspecified hyperlipidemia      PONV (postoperative nausea and vomiting) 8/19/2015     Trigeminal neuralgia        Past Surgical History   I have reviewed this patient's surgical history and updated it with pertinent information if needed.  Past Surgical History:   Procedure Laterality Date     BALLOON COMPRESSION RHIZOTOMY Right 8/20/2015    Procedure: BALLOON COMPRESSION RHIZOTOMY;  Surgeon: Luis Daniel Marino MD;  Location: [facility] OR     COLONOSCOPY  2007     EXCISE LESION HEAD Left 8/9/2017    Procedure: EXCISE LESION HEAD;  EXCISION OF PREAURICULAR CYST LEFT EAR;  Surgeon: Yordan Starkey MD;  Location: [facility] ESOPHAGOSCOPY, DIAGNOSTIC  2009    gastritis     [facility] NONSPECIFIC PROCEDURE  1970    hysterectomy BSO     [facility] NONSPECIFIC PROCEDURE  1999    flex       Prior to Admission Medications   Prior to Admission Medications   Prescriptions Last Dose Informant Patient Reported? Taking?   acetaminophen (TYLENOL) 500 MG tablet 6/4/2022 at x1 Daughter Yes Yes   Sig: Take 1,000 mg by mouth 3 times daily   aspirin 81 MG EC tablet 6/3/2022 at Unknown time Daughter Yes Yes   Sig: Take 81 mg by mouth daily   atorvastatin (LIPITOR) 10 MG tablet 6/3/2022 at Unknown time Daughter No Yes   Sig: Take 1 tablet (10 mg) by mouth daily   celecoxib (CELEBREX) 200 MG capsule 6/3/2022 at Unknown time Daughter Yes Yes   Sig: Take 200 mg by mouth daily   hydroxyurea (HYDREA) 500 MG capsule 6/3/2022 at Unknown time Daughter Yes Yes   Sig: Take by mouth daily   lisinopril (ZESTRIL) 10 MG tablet  6/3/2022 at Unknown time Daughter Yes Yes   Sig: Take 10 mg by mouth daily   melatonin 3 MG tablet Past Week at Unknown time Daughter Yes Yes   Sig: Take 1 mg by mouth nightly as needed for sleep      Facility-Administered Medications: None     Allergies   Allergies   Allergen Reactions     Hydrochlorothiazide      Other reaction(s): Hyponatremia     Penicillins Itching     Scopolamine Nausea     Nausea for two weeks after patch was removed.       Social History   I have reviewed this patient's social history and updated it with pertinent information if needed. Mejia Ordaz  reports that she has never smoked. She has never used smokeless tobacco. She reports that she does not drink alcohol and does not use drugs.    Family History   I have reviewed this patient's family history and updated it with pertinent information if needed.   Family History   Problem Relation Age of Onset     Cerebrovascular Disease Father        Review of Systems   The 10 point Review of Systems is negative other than noted in the HPI or here.     Physical Exam   Temp: 97.8  F (36.6  C) Temp src: Oral BP: 110/56 Pulse: 64   Resp: 16 SpO2: 96 % O2 Device: None (Room air)    Vital Signs with Ranges  Temp:  [97.4  F (36.3  C)-98.4  F (36.9  C)] 97.8  F (36.6  C)  Pulse:  [60-85] 64  Resp:  [14-18] 16  BP: ()/(53-80) 110/56  SpO2:  [93 %-98 %] 96 %  103 lbs 3.2 oz    Constitutional: awake, alert, cooperative, no apparent distress, and appears stated age  Eyes: extra-ocular muscles intact, no scleral icterus  ENT: normocepalic, atraumatic without obvious abnormality  Hematologic / Lymphatic:  No lymphedema   Respiratory: no increased work of breathing, symmetric chest wall expansion  Skin: normal skin color, texture, turgor  Musculoskeletal: Range of motion of both hips is reduced.  On the right hip she has pain with logroll and Stinchfield.  She has range of motion to 100 degrees of flexion on the right and on the left.  She has no internal  rotation on the right and about 10 degrees on the left.  External rotation about 20 degrees bilaterally pain with passive range of motion although not exquisite.  Neurologic: Mental Status Exam:  Level of Alertness:   awake  Orientation:   person, place, time  Cranial Nerves:  cranial nerves II-XII are grossly intact  Motor Exam:  moves all extremities well and symmetrically  Sensory:  Sensory intact  Neuropsychiatric: General: normal, calm and normal eye contact  Level of consciousness: alert / normal  Affect: normal  Orientation: oriented to self, place, time and situation    Data   Results for orders placed or performed during the hospital encounter of 06/04/22 (from the past 24 hour(s))   [facility] Draw    Narrative    The following orders were created for panel order [facility] Draw.  Procedure                               Abnormality         Status                     ---------                               -----------         ------                     Extra Blue Top Tube[[id number]]                              Final result               Extra Red Top Tube[[id number]]                               Final result               Extra Green Top (Lithium...[[id number]]                      Final result               Extra Purple Top Tube[[id number]]                            Final result                 Please view results for these tests on the individual orders.   Comprehensive metabolic panel   Result Value Ref Range    Sodium 125 (L) 133 - 144 mmol/L    Potassium 4.2 3.4 - 5.3 mmol/L    Chloride 92 (L) 94 - 109 mmol/L    Carbon Dioxide (CO2) 26 20 - 32 mmol/L    Anion Gap 7 3 - 14 mmol/L    Urea Nitrogen 17 7 - 30 mg/dL    Creatinine 0.80 0.52 - 1.04 mg/dL    Calcium 8.2 (L) 8.5 - 10.1 mg/dL    Glucose 112 (H) 70 - 99 mg/dL    Alkaline Phosphatase 75 40 - 150 U/L    AST 20 0 - 45 U/L    ALT 25 0 - 50 U/L    Protein Total 6.4 (L) 6.8 - 8.8 g/dL    Albumin 3.4 3.4 - 5.0 g/dL    Bilirubin Total 0.5 0.2 - 1.3 mg/dL     GFR Estimate 72 >60 mL/min/1.73m2   CBC with platelets + differential    Narrative    The following orders were created for panel order CBC with platelets + differential.  Procedure                               Abnormality         Status                     ---------                               -----------         ------                     CBC with platelets and d...[[id number]]  Abnormal            Final result                 Please view results for these tests on the individual orders.   Extra Blue Top Tube   Result Value Ref Range    Hold Specimen JIC    Extra Red Top Tube   Result Value Ref Range    Hold Specimen JIC    Extra Green Top (Lithium Heparin) Tube   Result Value Ref Range    Hold Specimen JIC    Extra Purple Top Tube   Result Value Ref Range    Hold Specimen JIC    CBC with platelets and differential   Result Value Ref Range    WBC Count 11.8 (H) 4.0 - 11.0 10e3/uL    RBC Count 4.13 3.80 - 5.20 10e6/uL    Hemoglobin 12.1 11.7 - 15.7 g/dL    Hematocrit 36.6 35.0 - 47.0 %    MCV 89 78 - 100 fL    MCH 29.3 26.5 - 33.0 pg    MCHC 33.1 31.5 - 36.5 g/dL    RDW 18.0 (H) 10.0 - 15.0 %    Platelet Count 568 (H) 150 - 450 10e3/uL    % Neutrophils 68 %    % Lymphocytes 22 %    % Monocytes 8 %    % Eosinophils 2 %    % Basophils 0 %    % Immature Granulocytes 0 %    NRBCs per 100 WBC 0 <1 /100    Absolute Neutrophils 8.0 1.6 - 8.3 10e3/uL    Absolute Lymphocytes 2.6 0.8 - 5.3 10e3/uL    Absolute Monocytes 1.0 0.0 - 1.3 10e3/uL    Absolute Eosinophils 0.2 0.0 - 0.7 10e3/uL    Absolute Basophils 0.0 0.0 - 0.2 10e3/uL    Absolute Immature Granulocytes 0.1 <=0.4 10e3/uL    Absolute NRBCs 0.0 10e3/uL   XR Pelvis and Hip Bilateral 2 Views    Narrative    EXAM: XR PELVIS AND HIP BILATERAL 2 VIEWS  LOCATION: [facility]  DATE/TIME: 06/04/2022, 3:58 PM    INDICATION: Hip pain.  COMPARISON: None.      Impression    IMPRESSION: Advanced bilateral degenerative changes with complete loss of joint  space superiorly and prominent subchondral cyst formation. No acute fracture.     Asymptomatic COVID-19 Virus (Coronavirus) by PCR Nasopharyngeal    Specimen: Nasopharyngeal; Swab   Result Value Ref Range    SARS CoV2 PCR Negative Negative    Narrative    Testing was performed using the Xpert Xpress SARS-CoV-2 Assay on the   Cepheid Gene-Xpert Instrument Systems. Additional information about   this Emergency Use Authorization (EUA) assay can be found via the Lab   Guide. This test should be ordered for the detection of SARS-CoV-2 in   individuals who meet SARS-CoV-2 clinical and/or epidemiological   criteria. Test performance is unknown in asymptomatic patients. This   test is for in vitro diagnostic use under the FDA EUA for   laboratories certified under CLIA to perform high complexity testing.   This test has not been FDA cleared or approved. A negative result   does not rule out the presence of PCR inhibitors in the specimen or   target RNA in concentration below the limit of detection for the   assay. The possibility of a false negative should be considered if   the patient's recent exposure or clinical presentation suggests   COVID-19. This test was validated by the [facility]. This laboratory is certified under the Clinical Laboratory Improvement Amendments of 1988 (CLIA-88) as qualified to perform high complexity laboratory testing.     Basic metabolic panel   Result Value Ref Range    Sodium 125 (L) 133 - 144 mmol/L    Potassium 4.1 3.4 - 5.3 mmol/L    Chloride 93 (L) 94 - 109 mmol/L    Carbon Dioxide (CO2) 25 20 - 32 mmol/L    Anion Gap 7 3 - 14 mmol/L    Urea Nitrogen 12 7 - 30 mg/dL    Creatinine 0.67 0.52 - 1.04 mg/dL    Calcium 8.1 (L) 8.5 - 10.1 mg/dL    Glucose 103 (H) 70 - 99 mg/dL    GFR Estimate 85 >60 mL/min/1.73m2   CBC with platelets   Result Value Ref Range    WBC Count 16.6 (H) 4.0 - 11.0 10e3/uL    RBC Count 4.08 3.80 - 5.20 10e6/uL    Hemoglobin 11.8  11.7 - 15.7 g/dL    Hematocrit 35.4 35.0 - 47.0 %    MCV 87 78 - 100 fL    MCH 28.9 26.5 - 33.0 pg    MCHC 33.3 31.5 - 36.5 g/dL    RDW 17.7 (H) 10.0 - 15.0 %    Platelet Count 530 (H) 150 - 450 10e3/uL   Osmolality   Result Value Ref Range    Osmolality Blood 260 (L) 280 - 301 mmol/kg    Narrative    Greater than 385 mmol/kg relates to stupor in hyperglycemia   Greater than 400 mmol/kg can relate to seizures   Greater than 420 mmol/kg can be lethal    Serum Osmalar Gap:   Normal <10   Larger suggest unmeasured substances present in serum (ethanol, methanol, isopropanol, mannitol, ethylene glycol).   TSH with free T4 reflex   Result Value Ref Range    TSH 0.62 0.40 - 4.00 mU/L   N terminal pro BNP outpatient   Result Value Ref Range    N Terminal Pro BNP Outpatient 235 0 - 1,800 pg/mL   CRP inflammation   Result Value Ref Range    CRP Inflammation 6.5 0.0 - 8.0 mg/L   Procalcitonin   Result Value Ref Range    Procalcitonin <0.05 <0.05 ng/mL   Hemoglobin A1c   Result Value Ref Range    Hemoglobin A1C 6.0 (H) 0.0 - 5.6 %   CK total   Result Value Ref Range    CK 40 30 - 225 U/L

## 2022-06-05 NOTE — PLAN OF CARE
[facility]      OUTPATIENT PHYSICAL THERAPY EVALUATION  PLAN OF TREATMENT FOR OUTPATIENT REHABILITATION  (COMPLETE FOR INITIAL CLAIMS ONLY)  Patient's Last Name, First Name, M.I.  YOB: 1935  Mejia Ordaz                        Provider's Name  [facility] Medical Record No.  [id number]                               Onset Date:  06/04/22   Start of Care Date:    6/4/2022     Type:     _X_PT   ___OT   ___SLP Medical Diagnosis:   Hip pain                        PT Diagnosis:  impaired functional mobility   Visits from SOC:  1   _________________________________________________________________________________  Plan of Treatment/Functional Goals    Planned Interventions: balance training, bed mobility training, gait training, home exercise program, patient/family education, ROM (range of motion), strengthening, stair training, transfer training, stretching, progressive activity/exercise     Goals: See Physical Therapy Goals on Care Plan in [employer] electronic health record.    Therapy Frequency: Daily  Predicted Duration of Therapy Intervention: 06/07/22  _________________________________________________________________________________    I CERTIFY THE NEED FOR THESE SERVICES FURNISHED UNDER        THIS PLAN OF TREATMENT AND WHILE UNDER MY CARE     (Physician co-signature of this document indicates review and certification of the therapy plan).               ,      Referring Physician: Thomas Lockwood, DO            Initial Assessment        See Physical Therapy evaluation dated   in Epic electronic health record.

## 2022-06-05 NOTE — UTILIZATION REVIEW
Concurrent stay review; Secondary Review Determination     [facility], [place]          Under the authority of the Utilization Management Committee, the utilization review process indicated a secondary review on the above patient.  The review outcome is based on review of the medical records, discussions with staff, and applying clinical experience noted on the date of the review.          (x) Observation Status Appropriate - Concurrent stay review    RATIONALE FOR DETERMINATION     86-year-old female with a past medical history significant for hypertension, dyslipidemia, trigeminal neuralgia, who presented to the Emergency Department with increasing right hip pain. MRI about a week and a half ago did not show any acute issues, just some degenerative change and mild narrowing without impingement. No evidence of acute fracture.   Patient mostly on oral pain medication.  She is receiving symptomatic management and possibly need placement per documentation.  She did have mild hyponatremia on admission possibly SIADH from increased pain however she is also on diuretic which likely is the culprit per documentation.  Mild hyponatremia requiring holding diuretic and water restriction, back pain treated with oral pain medication pending therapy assessment and possible placement does not meet criteria for prolonged inpatient hospital care.  Current observation status is appropriate        This document was produced using voice recognition software       The information on this document is developed by the utilization review team in order for the business office to ensure compliance.  This only denotes the appropriateness of proper admission status and does not reflect the quality of care rendered.         The definitions of Inpatient Status and Observation Status used in making the determination above are those provided in the CMS Coverage Manual, Chapter 1 and Chapter 6, section 70.4.      Sincerely,     MIRI URENA  MD JUN    System Medical Director  Utilization Management  [facility].

## 2022-06-05 NOTE — PLAN OF CARE
Goal Outcome Evaluation:    Orientation/Cognitive: AOx4  Observation Goals (Met/ Not Met): not met  Mobility Level/Assist Equipment: SBA  Fall Risk (Y/N): Y  Behavior Concerns: none  Pain Management: Pain with movement. scheduled tylenol,  Flexeril, and lidocaine patch on R hip  Tele/VS/O2: VSS on RA  ABNL Lab/BG: , WBC 11.8  Diet: Regular  Bowel/Bladder: continent  Skin Concerns: none  Drains/Devices: IVF infusing 100 mL/hr NS  Tests/Procedures for next shift: ortho surg and PT consult  Anticipated DC date & active delays: TBD pending improvement and plan  Patient Stated Goal for Today:

## 2022-06-05 NOTE — H&P
Admitted: 06/04/2022    MEDICINE ADMISSION    PRIMARY CARE PHYSICIAN:  Carlos Méndez MD    CODE STATUS:  Full Code.  Discussed with the patient and her daughter.    CHIEF COMPLAINT:  Right hip and radicular pain.    HISTORY OF PRESENT ILLNESS:  Ms. Ordaz is an 86-year-old female with a past medical history significant for chronic kidney disease, hypertension, dyslipidemia, trigeminal neuralgia, who presented to the Emergency Department with the above concerns.  History is obtained through discussion with the ED physician as well as the patient and her daughter at bedside.  The patient has been having pain in her right hip, dating back at least a few months in discussion with the patient and there was no preceding trauma that set all this off.  She does not recall exactly what happened, but just that she started having discomfort and it has been progressive.  She has been seen by Orthopedics who completed an MRI on 05/22/2022 which showed some mild degenerative changes, but no definitive acute issues.  She was seen by her PCP who has tried a 7-day course of prednisone, which did not help.  Tylenol #3 helps slightly.  The patient has had worsening symptoms to the point where now anytime she walks she does get pain.  She describes the pain as mostly on the lateral aspect of her hip and that occasionally will shoot down the lateral aspect towards her knee.  She also notes some tingling and pins and needle sensation.  Again, no trauma.  There is no pain on the inner aspect of her hip or groin.  No change in her bowel habits.  No perineal paresthesias.  No blood in her stool or urine.  No upper extremity symptoms.  No upper back symptoms.  She does not have any numbness or tingling in her toes, but does have some sensation changes down the lateral aspect of the right leg.  She has never before tried any medications for nerve pain.  The daughter does note the patient has had things like Bell's palsy and trigeminal  neuralgia and has had some nerve issues dating back many, many years.  She has never before had this specific type of pain.    In the Emergency Department the patient had a pelvic x-ray, which did not show any evidence of fracture.  I gave her a Lortab which caused her to throw up about 30-40 minutes later but did not significantly help with her discomfort.  She feels better when she is lying still, and will occasionally have bouts of pain when she pushes on the lateral aspect of her leg removed and a sudden fashion.  Also noted in the ED was mild hyponatremia for which the patient received a liter of IV fluids.    PAST MEDICAL HISTORY:  1.  Hypertension.  2.  Dyslipidemia.  3.  Osteoporosis.  4.  Trigeminal neuralgia.  5.  Bell's palsy.  6.  Myeloproliferative disorder.  7.  Giant cell arteritis.  8.  Essential thrombocytopenia.    MEDICATIONS:    Medications Prior to Admission   Medication Sig Dispense Refill Last Dose     acetaminophen (TYLENOL) 500 MG tablet Take 1,000 mg by mouth 3 times daily   6/4/2022 at x1     aspirin 81 MG EC tablet Take 81 mg by mouth daily   6/3/2022 at Unknown time     atorvastatin (LIPITOR) 10 MG tablet Take 1 tablet (10 mg) by mouth daily 90 tablet 0 6/3/2022 at Unknown time     celecoxib (CELEBREX) 200 MG capsule Take 200 mg by mouth daily   6/3/2022 at Unknown time     hydroxyurea (HYDREA) 500 MG capsule Take by mouth daily   6/3/2022 at Unknown time     lisinopril (ZESTRIL) 10 MG tablet Take 10 mg by mouth daily   6/3/2022 at Unknown time     melatonin 3 MG tablet Take 1 mg by mouth nightly as needed for sleep   Past Week at Unknown time         SOCIAL HISTORY:  The patient denies any use of tobacco or alcohol and lives independently.    FAMILY HISTORY:  Father had a stroke.    ALLERGIES:  HYDROCHLOROTHIAZIDE, PENICILLINS AND SCOPOLAMINE.    REVIEW OF SYSTEMS:  A complete review of systems reviewed and negative, save for the pertinent positives which are recorded in  HPI.    PHYSICAL EXAMINATION:    VITAL SIGNS:  Show blood pressure of 148/76, heart rate 69, respirations 15, satting 95% on room air, temperature 98.4 degrees Fahrenheit.   GENERAL:  The patient is lying in bed and appears fairly comfortable at rest.  HEENT:  Pupils are equal, round and reactive to light.  Extraocular muscle function intact.  No scleral icterus.  Oropharynx is clear.   NECK:  No lymphadenopathy or thyromegaly.  CARDIOVASCULAR:  Heart is regular rate and rhythm without any murmur, rub or gallop.  PULMONARY:  Lungs are clear to auscultation bilaterally without wheeze or rhonchi.  GASTROINTESTINAL:  Positive bowel sounds, soft, nontender and nondistended.  SKIN:  No new rashes or lesions.  LYMPHATICS:  No peripheral edema.  PSYCHIATRIC:  Alert and oriented x 3, normal affect.  MUSCULOSKELETAL:  She does have some discomfort upon flexion of the leg with external rotation, but it is not in the groin.  It is actually on the lateral aspect near the greater trochanter.  She has good muscle strength in trying to extend at the hip and into plantar flexion and dorsiflexion at the ankle.  No specific pain in the hip with internal or external rotation.  She does have a little bit of sensation deficit on the lateral aspect of the right leg closer up to the right hip.  There is some tenderness to palpation in the lateral aspect of the right hip, more superior, closer to the greater trochanter.  NEUROLOGIC:  Cranial nerves II through XII are grossly intact.  No focal deficits.    LABORATORY DATA:  WBC count 11.8, hemoglobin 12.1, platelets of 568,000.  Sodium 125, potassium 4.2, chloride 92, CO2 of 26, BUN 17, creatinine 0.8.  Unremarkable LFTs.    Plain film of the pelvis is negative for any acute fracture, but does show some advanced degenerative disease.    ASSESSMENT AND PLAN:  Ms. Ordaz is an 86-year-old female with a past medical history significant for hypertension, dyslipidemia, trigeminal neuralgia, who  presented to the Emergency Department with increasing right hip pain.  1.  Right hip and leg pain:  MRI about a week and a half ago did not show any acute issues, just some degenerative change and mild narrowing without impingement.  No evidence of acute fracture.  It is difficult for me to assess if this might be more nerve related; I considered something like a meralgia paresthetica though I do not have this definitively fits.  The patient does need some better pain control and I will ask Orthopedics to see her again, to see if they have any other thoughts.  At this point we will have scheduled Tylenol and low dose p.r.n. Dilaudid.  I also think it is worth while to consider starting something like Neurontin, so will start with 100 mg at bedtime for now and can consider up-titration depending on her response and Orthopedics.  Therapy consult ordered as well as social work for disposition.  2.  Mild hyponatremia:  The patient has received a liter of IV fluids, which may improve, so we will plan for a repeat BMP in the morning.  If this is not improved, would consider urine studies as would need to consider something like SIADH due to pain at that point.  I do note she is on Maxzide side so that is likely the culprit.  3.  Dyslipidemia:  Can resume statin upon discharge.  4.  Mild leukocytosis:  She is afebrile with no definitive infectious symptoms.  We will monitor for fever and plan to repeat in the morning.  5.  History of trigeminal neuralgia, Bell's palsy and giant cell arteritis.  6.  Myeloproliferative disorder:  Does have mildly elevated white count and platelets.  7.  Disposition:  Brought in under observation status pending Orthopedics, therapy and pain control.      Thomas Lockwood DO        D: 2022   T: 2022   MT: [id number]    Name:     ASHLEY VAZQUEZ  MRN:      [id number]        Account:     [id number]   :      1935           Admitted:    2022       Document: [id number]    cc:   Carlos Méndez MD

## 2022-06-05 NOTE — PROGRESS NOTES
Observation goals  PRIOR TO DISCHARGE         -Pain improved: partially met  -Able to ambulate: met (causes pain)  -Ortho consult complete: not met  -Safe dispo arranged: not met     Nurse to notify provider when observation goals have been met and patient is ready for discharge.

## 2022-06-05 NOTE — PROGRESS NOTES
Observation goals  PRIOR TO DISCHARGE       Comments: Pain improved: not met   able to ambulate: met (causes pain)   ortho consult complete: not met   safe dispo arranged: not met   Nurse to notify provider when observation goals have been met and patient is ready for discharge

## 2022-06-05 NOTE — PROGRESS NOTES
[facility]  Hospitalist Progress Note   06/05/2022          Assessment and Plan:       Mejia Ordaz is a 86 year old female with Essential thrombocythemia, hypertension, dyslipidemia, trigeminal neuralgia admitted on 6/4/2022 to the observation unit with right hip and leg pain.       Right hip and leg pain likely from degenerative joint disease.  Patient lives at home alone, presented to the ED with her daughter due to pain in her right hip and leg that began a few months ago.  No trauma or injury.  Seen by Ortho who completed an MRI on 5/22/22 which revealed mild degenerative changes, but no definitive acute issues.  After seeing her PCP, she was treated with a 7 day course of prednisone which was not helpful.  She gets some relief from Tylenol #3.  Her pain has now worsened to the point she is experiences pain with minimal walking.    In the ED a pelvic Xray showed no evidence of fracture.  -Orthopedics consult requested.    --Follow CRP, CK.  -Scheduled Tylenol 975 mg every 8 hours.    -Celebrex 200 mg/d.    -Low dose PRN Dilaudid.    -Start gabapentin 100 mg at bedtime for now and can consider up-titration depending on her response and Orthopedics.    -Flexeril 2.5 mg TID.    -Lidoderm patch every morning.    -PT consult requested.    -SW/CC consult requested.  Therapy consult ordered as well as social work for disposition.     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  Sodium of 125  Chest x-ray ordered-hyponatremia, cough, leukocytosis.  Check urine sodium, UA  TSH, proBNP  2 L fluid restriction.    IV hydration, monitor sodium levels tonight.      Leukocytosis  WBC: 11.8-->16.6.  She is afebrile with no definitive infectious symptoms.    Patient just completed a 7 day course of prednisone.  Trend WBC count, fever curve.  UA    Hyperglycemia.  Follow hemoglobin A1c.     HTN  Resumed on PTA lisinopril 10 mg/d.       Dyslipidemia  Hold PTA statin due to obs status and resume at discharge.       Essential  thrombocythemia  Myeloproliferative disorder    Does have mildly elevated white count and platelets.  Resumed on PTA Hydrea 500 mg/d.       History of trigeminal neuralgia, Bell's palsy and giant cell arteritis  Not currently on any medications.  No interventions at this time.       Orders Placed This Encounter      Regular Diet Adult      DVT Prophylaxis: SCDs, ambulate.  Code Status: Full Code  Disposition: Expected discharge in 1 to 2 days pending clinical improvement.    Discussed with patient, bedside RN  Total time greater than 35 minutes.  More than 50% of time spent in direct patient care, care coordination, patient counseling, and formalizing plan of care.     Fred Webster MD        Interval History:      Patient lying in bed.  Continues to complain of hip and leg pain.  Reports was on the right, now having pain on the left pain.  Denies any chest pain or palpitations.  Complaining of cough.  No fevers.  Reports increased frequency of urination.  No new tingling or numbness.  No focal weakness.         Physical Exam:        Physical Exam   Temp:  [97.4  F (36.3  C)-98  F (36.7  C)] 97.8  F (36.6  C)  Pulse:  [59-69] 59  Resp:  [14-18] 16  BP: (110-149)/(56-80) 118/65  SpO2:  [93 %-98 %] 96 %    Intake/Output Summary (Last 24 hours) at 6/5/2022 1625  Last data filed at 6/5/2022 1425  Gross per 24 hour   Intake 360 ml   Output 1200 ml   Net -840 ml       Admission Weight: 46.8 kg (103 lb 3.2 oz)  Current Weight: 46.8 kg (103 lb 3.2 oz)    PHYSICAL EXAM  GENERAL: Patient is in no distress. Alert and oriented.  HEENT: Oropharynx pink  HEART: Regular rate and rhythm. S1S2. No murmurs  LUNGS: Bilateral clear breath sounds.  Respirations unlabored  ABDOMEN: Soft, no abdominal tenderness, bowel sounds heard   NEURO: Moving all extremities.  No focal weakness.  Range of motion at the right and left hip.  Limited due to pain.  EXTREMITIES: No pedal edema.   SKIN: Warm, dry. No rash   PSYCHIATRY Cooperative        Medications:          acetaminophen  975 mg Oral Q8H     aspirin  81 mg Oral Daily     celecoxib  200 mg Oral Daily     cyclobenzaprine  2.5 mg Oral TID     gabapentin  100 mg Oral At Bedtime     hydroxyurea  500 mg Oral Daily     lidocaine  1 patch Transdermal Q24H     lidocaine   Transdermal Q8H CARMEN     lisinopril  10 mg Oral Daily     HYDROmorphone, melatonin, naloxone **OR** naloxone **OR** naloxone **OR** naloxone, ondansetron **OR** ondansetron, prochlorperazine **OR** prochlorperazine **OR** prochlorperazine         Data:      All new lab and imaging data was reviewed.

## 2022-06-05 NOTE — PROGRESS NOTES
Observation goals  PRIOR TO DISCHARGE         -Pain improved: not met  -Able to ambulate: met (causes pain)  -Ortho consult complete: not met  -Safe dispo arranged: not met     Nurse to notify provider when observation goals have been met and patient is ready for discharge.

## 2022-06-06 ENCOUNTER — APPOINTMENT (OUTPATIENT)
Dept: PHYSICAL THERAPY | Facility: CLINIC | Age: 87
End: 2022-06-06
Payer: COMMERCIAL

## 2022-06-06 VITALS
HEART RATE: 66 BPM | DIASTOLIC BLOOD PRESSURE: 45 MMHG | BODY MASS INDEX: 18.99 KG/M2 | TEMPERATURE: 97.2 F | HEIGHT: 62 IN | WEIGHT: 103.2 LBS | SYSTOLIC BLOOD PRESSURE: 103 MMHG | OXYGEN SATURATION: 97 % | RESPIRATION RATE: 18 BRPM

## 2022-06-06 LAB
ANION GAP SERPL CALCULATED.3IONS-SCNC: 4 MMOL/L (ref 3–14)
BUN SERPL-MCNC: 12 MG/DL (ref 7–30)
CALCIUM SERPL-MCNC: 8.3 MG/DL (ref 8.5–10.1)
CHLORIDE BLD-SCNC: 101 MMOL/L (ref 94–109)
CO2 SERPL-SCNC: 27 MMOL/L (ref 20–32)
CREAT SERPL-MCNC: 0.85 MG/DL (ref 0.52–1.04)
GFR SERPL CREATININE-BSD FRML MDRD: 67 ML/MIN/1.73M2
GLUCOSE BLD-MCNC: 94 MG/DL (ref 70–99)
POTASSIUM BLD-SCNC: 4.4 MMOL/L (ref 3.4–5.3)
SODIUM SERPL-SCNC: 132 MMOL/L (ref 133–144)
WBC # BLD AUTO: 9.5 10E3/UL (ref 4–11)

## 2022-06-06 PROCEDURE — 250N000013 HC RX MED GY IP 250 OP 250 PS 637: Performed by: PHYSICIAN ASSISTANT

## 2022-06-06 PROCEDURE — G0378 HOSPITAL OBSERVATION PER HR: HCPCS

## 2022-06-06 PROCEDURE — 97530 THERAPEUTIC ACTIVITIES: CPT | Mod: GP | Performed by: PHYSICAL THERAPIST

## 2022-06-06 PROCEDURE — 85048 AUTOMATED LEUKOCYTE COUNT: CPT | Performed by: HOSPITALIST

## 2022-06-06 PROCEDURE — 250N000013 HC RX MED GY IP 250 OP 250 PS 637: Performed by: INTERNAL MEDICINE

## 2022-06-06 PROCEDURE — 97116 GAIT TRAINING THERAPY: CPT | Mod: GP | Performed by: PHYSICAL THERAPIST

## 2022-06-06 PROCEDURE — 99217 PR OBSERVATION CARE DISCHARGE: CPT | Performed by: HOSPITALIST

## 2022-06-06 PROCEDURE — 36415 COLL VENOUS BLD VENIPUNCTURE: CPT | Performed by: HOSPITALIST

## 2022-06-06 PROCEDURE — 80048 BASIC METABOLIC PNL TOTAL CA: CPT | Performed by: HOSPITALIST

## 2022-06-06 RX ORDER — GABAPENTIN 100 MG/1
100 CAPSULE ORAL AT BEDTIME
Qty: 15 CAPSULE | Refills: 0 | Status: SHIPPED | OUTPATIENT
Start: 2022-06-06 | End: 2023-06-07

## 2022-06-06 RX ORDER — LIDOCAINE 4 G/G
1 PATCH TOPICAL EVERY 24 HOURS
Qty: 10 PATCH | Refills: 0 | Status: SHIPPED | OUTPATIENT
Start: 2022-06-06 | End: 2023-06-07

## 2022-06-06 RX ORDER — CYCLOBENZAPRINE HCL 5 MG
2.5 TABLET ORAL 3 TIMES DAILY PRN
Qty: 15 TABLET | Refills: 0 | Status: SHIPPED | OUTPATIENT
Start: 2022-06-06 | End: 2023-06-07

## 2022-06-06 RX ADMIN — CYCLOBENZAPRINE HYDROCHLORIDE 2.5 MG: 5 TABLET, FILM COATED ORAL at 14:20

## 2022-06-06 RX ADMIN — ACETAMINOPHEN 975 MG: 325 TABLET ORAL at 14:20

## 2022-06-06 RX ADMIN — ASPIRIN 81 MG: 81 TABLET, COATED ORAL at 08:04

## 2022-06-06 RX ADMIN — LISINOPRIL 10 MG: 10 TABLET ORAL at 08:04

## 2022-06-06 RX ADMIN — ACETAMINOPHEN 975 MG: 325 TABLET ORAL at 06:04

## 2022-06-06 RX ADMIN — CYCLOBENZAPRINE HYDROCHLORIDE 2.5 MG: 5 TABLET, FILM COATED ORAL at 08:04

## 2022-06-06 RX ADMIN — CELECOXIB 200 MG: 200 CAPSULE ORAL at 08:04

## 2022-06-06 RX ADMIN — LIDOCAINE 1 PATCH: 560 PATCH PERCUTANEOUS; TOPICAL; TRANSDERMAL at 08:04

## 2022-06-06 RX ADMIN — HYDROXYUREA 500 MG: 500 CAPSULE ORAL at 08:05

## 2022-06-06 NOTE — PLAN OF CARE
PRIMARY DIAGNOSIS: ACUTE BILATERAL HIP PAIN  OUTPATIENT/OBSERVATION GOALS TO BE MET BEFORE DISCHARGE:  1. Pain Status: Improved-controlled with oral pain medications.    2. Return to near baseline physical activity: No    3. Cleared for discharge by consultants (if involved): Yes    Discharge Planner Nurse   Safe discharge environment identified: Yes  Barriers to discharge: Yes, pain control & nausea improvement       Entered by: Salma Bunn RN 06/06/2022 12:24 PM     Please review provider order for any additional goals.   Nurse to notify provider when observation goals have been met and patient is ready for discharge.

## 2022-06-06 NOTE — PLAN OF CARE
PRIMARY DIAGNOSIS: ACUTE BILATERAL HIP PAIN  OUTPATIENT/OBSERVATION GOALS TO BE MET BEFORE DISCHARGE:  1. Pain Status: Improved-controlled with oral pain medications.    2. Return to near baseline physical activity: No    3. Cleared for discharge by consultants (if involved): Yes    Discharge Planner Nurse   Safe discharge environment identified: Yes  Barriers to discharge: Yes, pain control & nausea improvement       Entered by: Salma Bunn RN 06/06/2022 8:28 AM     Please review provider order for any additional goals.   Nurse to notify provider when observation goals have been met and patient is ready for discharge.

## 2022-06-06 NOTE — PLAN OF CARE
Physical Therapy Discharge Summary    Reason for therapy discharge:    Discharged to home with home therapy.    Progress towards therapy goal(s). See goals on Care Plan in [facility] electronic health record for goal details.  Goals partially met.  Barriers to achieving goals:   discharge from facility.    Therapy recommendation(s):    Continued therapy is recommended.  Rationale/Recommendations:  Recommend continued PT to address bilateral hip pain and weakness, orders for home PT and FWW for home use issued.

## 2022-06-06 NOTE — PROGRESS NOTES
Care Management Discharge Note    Discharge Date: 06/06/2022       Discharge Disposition:  Home    Discharge Services:  Home Care-Referral sent to [facility]     Discharge DME:  Walker    Discharge Transportation: family or friend will provide    Private pay costs discussed: Not applicable    PAS Confirmation Code:  N/A  Patient/family educated on Medicare website which has current facility and service quality ratings:  yes     Education Provided on the Discharge Plan:  yes  Persons Notified of Discharge Plans: Patient, [facility], bedside RN  Patient/Family in Agreement with the Plan:      Handoff Referral Completed: Yes    Additional Information:  Writer informed that patient is medically cleared to discharge today. Home Care PT has been ordered. Referral sent via e-mail to [facility].            Maia Abarca RN  Care Coordinator  [phone]

## 2022-06-06 NOTE — PLAN OF CARE
VSS. A/O. Pain in bilateral hips. Lidocaine patch, scheduled Tylenol & Flexeril given for pain. Outpatient follow-up with [facility]. 2L fluid restriction. Up SBA with walker. Discharge home with daughter this afternoon. Discharge instructions reviewed with patients daughter (Shameka) including new medications. All questions answered. Patient and daughter verbalize understanding of discharge care/plan.      Message left with CC team to call patients daughter (Shameka) re home health care agency and phone number.

## 2022-06-06 NOTE — PLAN OF CARE
Goal Outcome Evaluation:        Orientation/Cognitive: AOx4  Observation Goals (Met/ Not Met): not met  Mobility Level/Assist Equipment: SBA  Fall Risk (Y/N): Y  Behavior Concerns: none  Pain Management: Pain with movement. scheduled tylenol,  Flexeril, and lidocaine patch on R hip  Tele/VS/O2: VSS on RA  ABNL Lab/BG: , WBC 11.8  Diet: Regular  Bowel/Bladder: continent  Skin Concerns: none  Drains/Devices: IVF SL  Tests/Procedures for next shift: ortho surg and PT consult  Anticipated DC date & active delays: TBD pending improvement and plan  Patient Stated Goal for Today:

## 2022-06-06 NOTE — DISCHARGE SUMMARY
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  Emphysematous lungs on imaging.  Leukocytosis likely reactive -improved  Prediabetes with a hemoglobin A1c of 6.0.    Past Medical History:   Diagnosis Date     Arteritis (H)     CHRONIC     Atrophic vaginitis 11/13/2012     Bell's palsy     rt facial pain controlled by Tegretal     Chronic steroid use 8/19/2015     Chronic UTI 11/13/2012     CKD (chronic kidney disease) stage 2, GFR 60-89 ml/min 6/2/2012     Headache(784.0)      Hyperlipidemia LDL goal <100 6/2/2012     Hypertension goal BP (blood pressure) < 140/90 9/27/2011     Microalbuminuria 11/13/2012     Mixed hyperlipidemia 11/13/2012     Need for prophylactic hormone replacement therapy (postmenopausal)      Osteoporosis, unspecified 2/03    on HRT, calcium and Vit.     Other and unspecified hyperlipidemia      PONV (postoperative nausea and vomiting) 8/19/2015     Trigeminal neuralgia        History of Present Illness   Mejia Ordaz is an 86 year old female who presented with right hip pain.    Hospital Course   Mejia Ordaz is a 86 year old female with Essential thrombocythemia, hypertension, dyslipidemia, trigeminal neuralgia admitted on 6/4/2022 to the observation unit with right hip and leg pain.       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  Patient lives alone at home, presented to ED with her daughter due to right hip and leg pain for a few months.  No trauma or injury.  Seen by Ortho who completed an MRI on 5/22/22 which revealed mild degenerative changes, but no definitive acute issues.  After seeing her PCP, she was treated with a 7  day course of prednisone which was not helpful.   CK 40, CRP 6.5.  Pelvic Xray showed no evidence of fracture, Advanced bilateral degenerative changes with complete loss of joint space superiorly and prominent subchondral cyst formation.   Orthopedic surgery followed, recommend elective total hip arthroplasty as outpatient.  Scheduled Tylenol 975 mg every 8 hours.    Continue PTA Celebrex 200 mg/d.  [Consider GI prophylaxis if on it for long-term]  Started on gabapentin 100 mg at bedtime.  Reports some improvement in symptoms.  Can uptitrate as needed as outpatient.  Flexeril 2.5 mg 3 times a day as needed for muscle spasm.  Lidoderm patch morning application.  PT recommends home with home PT.  Home physical therapy referral requested.  Discussed with patient's daughter, her daughter Shameka over the telephone and comfortable with discharge plan. Outpatient follow-up with PCP in 7 to 10 days.   Follow-up with primary orthopedic team after discharge.   Fall precautions.     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  Sodium of 125 >132.  Urine osmolality 138, serum osmolality 260.  UA no concerns for infection.  Urine sodium 15.  TSH within normal limits.  proBNP 235.  Hemoglobin A1c 6.0.  Chest x-ray with hyperinflated lungs.  No infiltrates.  Received IV fluids, fluid restriction with which sodium levels improved.  Continue regular diet, 2000 mL fluid restriction on discharge.  Monitor BMP in 7 to 10 days.     Emphysematous lungs on imaging.  Chest x-ray with hyperinflated emphysematous lungs.  No hypoxia, no shortness of breath.  Procalcitonin less than 0.05.  Consider evaluation as outpatient if symptomatic     Leukocytosis likely reactive -improved  WBC: 11.8-->16.6 >9.5.  Just completed course of prednisone  Afebrile, UA no concerns for infection.  Chest x-ray no infiltrates.    Prediabetes with a hemoglobin A1c of 6.0.  Noted hyperglycemia, age-appropriate health maintenance and follow blood sugars on PCP visit     HTN  Continue  PTA aspirin.  Defer to PCP for GI prophylaxis if on long-term Celebrex and aspirin.  Continue PTA lisinopril     Dyslipidemia  Continue PTA statin at discharge.     Essential thrombocythemia  Myeloproliferative disorder    Does have mildly elevated white count and platelets.  Resumed on PTA Hydrea 500 mg/d.       History of trigeminal neuralgia, Bell's palsy and giant cell arteritis  Not currently on any medications.    No interventions at this time.      Insomnia.  Started on gabapentin at bedtime to help with pain.  Continue PTA melatonin.     Fred Webster MD.    Pending Results   Unresulted Labs Ordered in the Past 30 Days of this Admission     No orders found from 5/5/2022 to 6/5/2022.             Physical Exam   Vitals:    06/04/22 1900   Weight: 46.8 kg (103 lb 3.2 oz)     Vital Signs with Ranges  Temp:  [97.2  F (36.2  C)-98  F (36.7  C)] 97.2  F (36.2  C)  Pulse:  [56-66] 66  Resp:  [16-18] 18  BP: (103-143)/(45-74) 103/45  SpO2:  [95 %-98 %] 97 %  I/O last 3 completed shifts:  In: 760 [P.O.:760]  Out: 2900 [Urine:2900]  PHYSICAL EXAM  GENERAL: Patient is in no distress. Alert and oriented.  HEART: Regular rate and rhythm. S1S2. No murmurs  LUNGS: Bilateral clear breath sounds.  Respirations unlabored  ABDOMEN: Soft, no abdominal tenderness, bowel sounds heard   NEURO: Moving all extremities.  No focal weakness.  Range of motion at the right and left hip slightly limited due to pain.  EXTREMITIES: No pedal edema.   SKIN: Warm, dry. No rash   PSYCHIATRY Cooperative  )Consultations This Hospital Stay   PHYSICAL THERAPY ADULT IP CONSULT  ORTHOPEDIC SURGERY IP CONSULT    Time Spent on this Encounter   Fred FARRAR MD, personally saw the patient today and spent greater than 30 minutes discharging this patient. Discussed with patient, her daughter Shameka over the telephone, bedside RN.    Discharge Disposition   Discharged to home  Condition at discharge: Stable    Discharge Orders      Home Care Referral       Reason for your hospital stay    You were admitted to the hospital with right hip and leg pain from degenerative joint disease.  Followed by orthopedic team, outpatient evaluation for surgery recommended.    Trial of gabapentin, lidocaine patch, Flexeril with which having some improvement in symptoms.     Activity    Your activity upon discharge: activity as tolerated and no driving for today     Discharge Instructions    Fall precautions.  Follow-up with outpatient physical therapy.     Follow-up and recommended labs and tests     Follow up with primary care provider,within 7 days for hospital follow- up.  The following labs/tests are recommended: Follow-up BMP in 7 to 10 days or earlier if symptomatic.  Age-appropriate health maintenance on PCP visit.  Noted to have prediabetes, monitor blood sugars periodically.    Follow-up with primary orthopedic team as outpatient for elective hip arthroplasty.  Can consider up titrating dose of gabapentin on PCP visit if needed.  Consider GI prophylaxis if on long-term Celebrex and aspirin.    Consider further evaluation for emphysematous lung noted on imaging if symptomatic with shortness of breath.     Walker Order for DME - ONLY FOR DME    I, the undersigned, certify that the above prescribed supplies are medically necessary for this patient and is both reasonable and necessary in reference to accepted standards of medical and necessary in reference to accepted standards of medical practice in the treatment of this patient's condition and is not prescribed as a convenience.      Diet    Follow this diet upon discharge: Orders Placed This Encounter      Fluid restriction 2000 ML FLUID      Regular Diet Adult       Discharge Medications   Current Discharge Medication List      START taking these medications    Details   cyclobenzaprine (FLEXERIL) 5 MG tablet Take 0.5 tablets (2.5 mg) by mouth 3 times daily as needed for muscle spasms  Qty: 15 tablet, Refills: 0     Associated Diagnoses: Hip pain, right      gabapentin (NEURONTIN) 100 MG capsule Take 1 capsule (100 mg) by mouth At Bedtime  Qty: 15 capsule, Refills: 0    Associated Diagnoses: Hip pain, right      Lidocaine (LIDOCARE) 4 % Patch Place 1 patch onto the skin every 24 hours , to right hip. To prevent lidocaine toxicity, patient should be patch free for 12 hrs daily.  Qty: 10 patch, Refills: 0    Associated Diagnoses: Hip pain, right         CONTINUE these medications which have NOT CHANGED    Details   acetaminophen (TYLENOL) 500 MG tablet Take 1,000 mg by mouth 3 times daily      aspirin 81 MG EC tablet Take 81 mg by mouth daily      atorvastatin (LIPITOR) 10 MG tablet Take 1 tablet (10 mg) by mouth daily  Qty: 90 tablet, Refills: 0    Associated Diagnoses: Other and unspecified hyperlipidemia      celecoxib (CELEBREX) 200 MG capsule Take 200 mg by mouth daily      hydroxyurea (HYDREA) 500 MG capsule Take by mouth daily      lisinopril (ZESTRIL) 10 MG tablet Take 10 mg by mouth daily      melatonin 3 MG tablet Take 1 mg by mouth nightly as needed for sleep           Allergies   Allergies   Allergen Reactions     Hydrochlorothiazide      Other reaction(s): Hyponatremia     Penicillins Itching     Scopolamine Nausea     Nausea for two weeks after patch was removed.       DATA  Most Recent 3 CBC's:Recent Labs   Lab Test 06/06/22 0622 06/05/22  0545 06/04/22  1524 05/14/21  0951   WBC 9.5 16.6* 11.8* 8.7   HGB  --  11.8 12.1 11.7   MCV  --  87 89 84   PLT  --  530* 568* 721*      Most Recent 3 BMP's:  Recent Labs   Lab Test 06/06/22 0622 06/05/22 2038 06/05/22  0545 06/04/22  1524   * 130* 125* 125*   POTASSIUM 4.4  --  4.1 4.2   CHLORIDE 101  --  93* 92*   CO2 27  --  25 26   BUN 12  --  12 17   CR 0.85  --  0.67 0.80   ANIONGAP 4  --  7 7   LEIGH 8.3*  --  8.1* 8.2*   GLC 94  --  103* 112*     Most Recent 2 LFT's:  Recent Labs   Lab Test 06/04/22  1524 07/21/14  1324   AST 20 47*   ALT 25 42   ALKPHOS 75 88    BILITOTAL 0.5 0.6     Most Recent TSH, T4 and A1c Labs:  Recent Labs   Lab Test 06/05/22  0545   TSH 0.62   A1C 6.0*     Results for orders placed or performed during the hospital encounter of 06/04/22   XR Pelvis and Hip Bilateral 2 Views    Narrative    EXAM: XR PELVIS AND HIP BILATERAL 2 VIEWS  LOCATION: [facility]  DATE/TIME: 06/04/2022, 3:58 PM    INDICATION: Hip pain.  COMPARISON: None.      Impression    IMPRESSION: Advanced bilateral degenerative changes with complete loss of joint space superiorly and prominent subchondral cyst formation. No acute fracture.     XR Chest 2 Views    Narrative    EXAM: XR CHEST 2 VW  LOCATION: [facility]  DATE/TIME: 6/5/2022 8:15 PM    INDICATION: Hyponatremia, cough, leukocytosis.  COMPARISON: None.      Impression    IMPRESSION: Hyperinflated likely emphysematous lungs. No focal consolidation or effusion. Heart size is normal. Degenerative change of the spine.

## 2022-10-15 ENCOUNTER — HEALTH MAINTENANCE LETTER (OUTPATIENT)
Age: 87
End: 2022-10-15

## 2022-11-25 ENCOUNTER — LAB (OUTPATIENT)
Dept: URGENT CARE | Facility: URGENT CARE | Age: 87
End: 2022-11-25
Payer: COMMERCIAL

## 2022-11-25 DIAGNOSIS — Z20.822 ENCOUNTER FOR LABORATORY TESTING FOR COVID-19 VIRUS: ICD-10-CM

## 2022-11-25 LAB — SARS-COV-2 RNA RESP QL NAA+PROBE: NEGATIVE

## 2022-11-25 PROCEDURE — U0005 INFEC AGEN DETEC AMPLI PROBE: HCPCS

## 2022-11-25 PROCEDURE — U0003 INFECTIOUS AGENT DETECTION BY NUCLEIC ACID (DNA OR RNA); SEVERE ACUTE RESPIRATORY SYNDROME CORONAVIRUS 2 (SARS-COV-2) (CORONAVIRUS DISEASE [COVID-19]), AMPLIFIED PROBE TECHNIQUE, MAKING USE OF HIGH THROUGHPUT TECHNOLOGIES AS DESCRIBED BY CMS-2020-01-R: HCPCS

## 2022-11-27 ENCOUNTER — ANESTHESIA EVENT (OUTPATIENT)
Dept: SURGERY | Facility: CLINIC | Age: 87
DRG: 470 | End: 2022-11-27
Payer: COMMERCIAL

## 2022-11-27 NOTE — ANESTHESIA PREPROCEDURE EVALUATION
Anesthesia Pre-Procedure Evaluation    Patient: Mejia Ordaz   MRN: [id number] : 1935        Procedure : Procedure(s):  RIGHT DIRECT ANTERIOR TOTAL HIP  ARTHROPLASTY          Past Medical History:   Diagnosis Date     Anemia      Arteritis (H)     CHRONIC     Arthritis      Atrophic vaginitis 2012     Bell's palsy     rt facial pain controlled by Tegretal     Chronic steroid use 2015     Chronic UTI 2012     CKD (chronic kidney disease) stage 2, GFR 60-89 ml/min 2012     Depression      Headache(784.0)      Hyperlipidemia LDL goal <100 2012     Hypertension goal BP (blood pressure) < 140/90 2011     Microalbuminuria 2012     Mixed hyperlipidemia 2012     Need for prophylactic hormone replacement therapy (postmenopausal)      Osteoporosis, unspecified 2003    on HRT, calcium and Vit.     Other and unspecified hyperlipidemia      PONV (postoperative nausea and vomiting) 2015     Thrombocytopenia (H)      Trigeminal neuralgia       Past Surgical History:   Procedure Laterality Date     BALLOON COMPRESSION RHIZOTOMY Right 2015    Procedure: BALLOON COMPRESSION RHIZOTOMY;  Surgeon: Luis Daniel Marino MD;  Location: [facility] OR     COLONOSCOPY  2007     EXCISE LESION HEAD Left 2017    Procedure: EXCISE LESION HEAD;  EXCISION OF PREAURICULAR CYST LEFT EAR;  Surgeon: Yordan Starkey MD;  Location: [facility] ESOPHAGOSCOPY, DIAGNOSTIC      gastritis     [facility] NONSPECIFIC PROCEDURE      hysterectomy BSO     [facility] NONSPECIFIC PROCEDURE      flex      Allergies   Allergen Reactions     Hydrochlorothiazide      Other reaction(s): Hyponatremia     Penicillins Itching     Scopolamine Nausea     Nausea for two weeks after patch was removed.      Social History     Tobacco Use     Smoking status: Never     Smokeless tobacco: Never   Substance Use Topics     Alcohol use: No     Alcohol/week: 0.0 standard drinks      Wt Readings from Last 1 Encounters:    06/04/22 46.8 kg (103 lb 3.2 oz)        Anesthesia Evaluation   Pt has had prior anesthetic.     History of anesthetic complications  - PONV.      ROS/MED HX  ENT/Pulmonary:       Neurologic: Comment: H/o Bell's plasy  H/o Right V2 trigeminal neural    5/21 MRI  IMPRESSION:  1. 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  2. Normal carotid systems and right vertebral artery.      Cardiovascular: Comment: H/o giant cell arteritis    QTc 434    (+) Dyslipidemia hypertension-----    METS/Exercise Tolerance:     Hematologic: Comments: H/o thrombocythemia - plt 626    Hgb 12    (+) anemia,     Musculoskeletal:   (+) arthritis,     GI/Hepatic: Comment: H/o dysphagia      Renal/Genitourinary: Comment: Cr 0.97    (+) renal disease, type: CRI,     Endo:  - neg endo ROS     Psychiatric/Substance Use:     (+) psychiatric history depression     Infectious Disease:  - neg infectious disease ROS     Malignancy: Comment: H/o myeloproliferative dz      Other:            Physical Exam    Airway  airway exam normal      Mallampati: II   TM distance: > 3 FB   Neck ROM: full   Mouth opening: > 3 cm    Respiratory Devices and Support         Dental  no notable dental history         Cardiovascular   cardiovascular exam normal          Pulmonary   pulmonary exam normal            Other findings: Bell's Palsy.    OUTSIDE LABS:  CBC:   Lab Results   Component Value Date    WBC 9.5 06/06/2022    WBC 16.6 (H) 06/05/2022    HGB 11.8 06/05/2022    HGB 12.1 06/04/2022    HCT 35.4 06/05/2022    HCT 36.6 06/04/2022     (H) 06/05/2022     (H) 06/04/2022     BMP:   Lab Results   Component Value Date     (L) 06/06/2022     (L) 06/05/2022    POTASSIUM 4.4 06/06/2022    POTASSIUM 4.1 06/05/2022    CHLORIDE 101 06/06/2022    CHLORIDE 93 (L) 06/05/2022    CO2 27 06/06/2022    CO2 25 06/05/2022    BUN 12 06/06/2022    BUN 12 06/05/2022    CR 0.85 06/06/2022    CR 0.67 06/05/2022    GLC 94 06/06/2022      (H) 06/05/2022     COAGS: No results found for: PTT, INR, FIBR  POC: No results found for: BGM, HCG, HCGS  HEPATIC:   Lab Results   Component Value Date    ALBUMIN 3.4 06/04/2022    PROTTOTAL 6.4 (L) 06/04/2022    ALT 25 06/04/2022    AST 20 06/04/2022    ALKPHOS 75 06/04/2022    BILITOTAL 0.5 06/04/2022     OTHER:   Lab Results   Component Value Date    LACT 0.8 07/21/2014    A1C 6.0 (H) 06/05/2022    LEIGH 8.3 (L) 06/06/2022    MAG 2.4 (H) 09/01/2018    TSH 0.62 06/05/2022    CRP 6.5 06/05/2022       Anesthesia Plan    ASA Status:  3      Anesthesia Type: General.     - Airway: ETT   Induction: Intravenous.   Maintenance: Balanced.   Techniques and Equipment:     - Airway: Video-Laryngoscope         Consents    Anesthesia Plan(s) and associated risks, benefits, and realistic alternatives discussed. Questions answered and patient/representative(s) expressed understanding.    - Discussed:     - Discussed with:  Patient         Postoperative Care    Pain management: IV analgesics, Multi-modal analgesia.   PONV prophylaxis: Ondansetron (or other 5HT-3), Dexamethasone or Solumedrol, Aprepitant     Comments:                Carter Benitez MD

## 2022-11-28 ENCOUNTER — ANESTHESIA (OUTPATIENT)
Dept: SURGERY | Facility: CLINIC | Age: 87
DRG: 470 | End: 2022-11-28
Payer: COMMERCIAL

## 2022-11-28 ENCOUNTER — HOSPITAL ENCOUNTER (INPATIENT)
Facility: CLINIC | Age: 87
LOS: 2 days | Discharge: HOME HEALTH | DRG: 470 | End: 2022-12-01
Attending: ORTHOPAEDIC SURGERY | Admitting: ORTHOPAEDIC SURGERY
Payer: COMMERCIAL

## 2022-11-28 ENCOUNTER — APPOINTMENT (OUTPATIENT)
Dept: PHYSICAL THERAPY | Facility: CLINIC | Age: 87
DRG: 470 | End: 2022-11-28
Attending: ORTHOPAEDIC SURGERY
Payer: COMMERCIAL

## 2022-11-28 ENCOUNTER — APPOINTMENT (OUTPATIENT)
Dept: GENERAL RADIOLOGY | Facility: CLINIC | Age: 87
DRG: 470 | End: 2022-11-28
Attending: ORTHOPAEDIC SURGERY
Payer: COMMERCIAL

## 2022-11-28 DIAGNOSIS — R13.10 DYSPHAGIA, UNSPECIFIED TYPE: ICD-10-CM

## 2022-11-28 DIAGNOSIS — Z96.641 STATUS POST TOTAL REPLACEMENT OF RIGHT HIP: Primary | ICD-10-CM

## 2022-11-28 DIAGNOSIS — J18.9 PNEUMONIA OF LEFT LOWER LOBE DUE TO INFECTIOUS ORGANISM: ICD-10-CM

## 2022-11-28 DIAGNOSIS — E55.9 VITAMIN D DEFICIENCY: ICD-10-CM

## 2022-11-28 LAB
ALBUMIN SERPL-MCNC: 4 G/DL (ref 3.4–5)
ALP SERPL-CCNC: 94 U/L (ref 40–150)
ALT SERPL W P-5'-P-CCNC: 18 U/L (ref 0–50)
ANION GAP SERPL CALCULATED.3IONS-SCNC: 10 MMOL/L (ref 3–14)
AST SERPL W P-5'-P-CCNC: 22 U/L (ref 0–45)
BASOPHILS # BLD AUTO: 0 10E3/UL (ref 0–0.2)
BASOPHILS NFR BLD AUTO: 0 %
BILIRUB SERPL-MCNC: 0.5 MG/DL (ref 0.2–1.3)
BUN SERPL-MCNC: 19 MG/DL (ref 7–30)
CALCIUM SERPL-MCNC: 8.9 MG/DL (ref 8.5–10.1)
CHLORIDE BLD-SCNC: 103 MMOL/L (ref 94–109)
CO2 SERPL-SCNC: 27 MMOL/L (ref 20–32)
CREAT SERPL-MCNC: 0.77 MG/DL (ref 0.52–1.04)
CREAT SERPL-MCNC: 0.78 MG/DL (ref 0.52–1.04)
EOSINOPHIL # BLD AUTO: 0 10E3/UL (ref 0–0.7)
EOSINOPHIL NFR BLD AUTO: 0 %
ERYTHROCYTE [DISTWIDTH] IN BLOOD BY AUTOMATED COUNT: 14 % (ref 10–15)
GFR SERPL CREATININE-BSD FRML MDRD: 74 ML/MIN/1.73M2
GFR SERPL CREATININE-BSD FRML MDRD: 75 ML/MIN/1.73M2
GLUCOSE BLD-MCNC: 99 MG/DL (ref 70–99)
HCT VFR BLD AUTO: 31.7 % (ref 35–47)
HGB BLD-MCNC: 9.8 G/DL (ref 11.7–15.7)
IMM GRANULOCYTES # BLD: 0.1 10E3/UL
IMM GRANULOCYTES NFR BLD: 1 %
LYMPHOCYTES # BLD AUTO: 1 10E3/UL (ref 0.8–5.3)
LYMPHOCYTES NFR BLD AUTO: 5 %
MCH RBC QN AUTO: 29.3 PG (ref 26.5–33)
MCHC RBC AUTO-ENTMCNC: 30.9 G/DL (ref 31.5–36.5)
MCV RBC AUTO: 95 FL (ref 78–100)
MONOCYTES # BLD AUTO: 1 10E3/UL (ref 0–1.3)
MONOCYTES NFR BLD AUTO: 5 %
NEUTROPHILS # BLD AUTO: 16.2 10E3/UL (ref 1.6–8.3)
NEUTROPHILS NFR BLD AUTO: 89 %
NRBC # BLD AUTO: 0 10E3/UL
NRBC BLD AUTO-RTO: 0 /100
PLATELET # BLD AUTO: 668 10E3/UL (ref 150–450)
POTASSIUM BLD-SCNC: 3.7 MMOL/L (ref 3.4–5.3)
POTASSIUM BLD-SCNC: 3.8 MMOL/L (ref 3.4–5.3)
PROT SERPL-MCNC: 7.4 G/DL (ref 6.8–8.8)
RBC # BLD AUTO: 3.35 10E6/UL (ref 3.8–5.2)
SODIUM SERPL-SCNC: 140 MMOL/L (ref 133–144)
URATE SERPL-MCNC: 5.2 MG/DL (ref 2.6–6)
WBC # BLD AUTO: 18.3 10E3/UL (ref 4–11)

## 2022-11-28 PROCEDURE — 84132 ASSAY OF SERUM POTASSIUM: CPT | Performed by: ANESTHESIOLOGY

## 2022-11-28 PROCEDURE — 97116 GAIT TRAINING THERAPY: CPT | Mod: GP | Performed by: PHYSICAL THERAPIST

## 2022-11-28 PROCEDURE — 999N000179 XR SURGERY CARM FLUORO LESS THAN 5 MIN W STILLS

## 2022-11-28 PROCEDURE — 258N000003 HC RX IP 258 OP 636: Performed by: NURSE ANESTHETIST, CERTIFIED REGISTERED

## 2022-11-28 PROCEDURE — C1713 ANCHOR/SCREW BN/BN,TIS/BN: HCPCS | Performed by: ORTHOPAEDIC SURGERY

## 2022-11-28 PROCEDURE — 250N000009 HC RX 250: Performed by: ORTHOPAEDIC SURGERY

## 2022-11-28 PROCEDURE — 258N000001 HC RX 258: Performed by: ORTHOPAEDIC SURGERY

## 2022-11-28 PROCEDURE — 272N000001 HC OR GENERAL SUPPLY STERILE: Performed by: ORTHOPAEDIC SURGERY

## 2022-11-28 PROCEDURE — 99207 PR NO CHARGE LOS: CPT | Performed by: PHYSICIAN ASSISTANT

## 2022-11-28 PROCEDURE — 250N000011 HC RX IP 250 OP 636: Performed by: ORTHOPAEDIC SURGERY

## 2022-11-28 PROCEDURE — 73502 X-RAY EXAM HIP UNI 2-3 VIEWS: CPT

## 2022-11-28 PROCEDURE — 84550 ASSAY OF BLOOD/URIC ACID: CPT | Performed by: ORTHOPAEDIC SURGERY

## 2022-11-28 PROCEDURE — 250N000013 HC RX MED GY IP 250 OP 250 PS 637: Performed by: PHYSICIAN ASSISTANT

## 2022-11-28 PROCEDURE — 36415 COLL VENOUS BLD VENIPUNCTURE: CPT | Performed by: ANESTHESIOLOGY

## 2022-11-28 PROCEDURE — 999N000141 HC STATISTIC PRE-PROCEDURE NURSING ASSESSMENT: Performed by: ORTHOPAEDIC SURGERY

## 2022-11-28 PROCEDURE — 0SR901A REPLACEMENT OF RIGHT HIP JOINT WITH METAL SYNTHETIC SUBSTITUTE, UNCEMENTED, OPEN APPROACH: ICD-10-PCS | Performed by: ORTHOPAEDIC SURGERY

## 2022-11-28 PROCEDURE — 82565 ASSAY OF CREATININE: CPT | Performed by: ANESTHESIOLOGY

## 2022-11-28 PROCEDURE — 258N000003 HC RX IP 258 OP 636: Performed by: ANESTHESIOLOGY

## 2022-11-28 PROCEDURE — 97161 PT EVAL LOW COMPLEX 20 MIN: CPT | Mod: GP | Performed by: PHYSICAL THERAPIST

## 2022-11-28 PROCEDURE — 258N000003 HC RX IP 258 OP 636: Performed by: ORTHOPAEDIC SURGERY

## 2022-11-28 PROCEDURE — 250N000011 HC RX IP 250 OP 636: Performed by: NURSE ANESTHETIST, CERTIFIED REGISTERED

## 2022-11-28 PROCEDURE — 250N000013 HC RX MED GY IP 250 OP 250 PS 637: Performed by: ORTHOPAEDIC SURGERY

## 2022-11-28 PROCEDURE — C1776 JOINT DEVICE (IMPLANTABLE): HCPCS | Performed by: ORTHOPAEDIC SURGERY

## 2022-11-28 PROCEDURE — 80053 COMPREHEN METABOLIC PANEL: CPT | Performed by: ORTHOPAEDIC SURGERY

## 2022-11-28 PROCEDURE — 250N000025 HC SEVOFLURANE, PER MIN: Performed by: ORTHOPAEDIC SURGERY

## 2022-11-28 PROCEDURE — 250N000012 HC RX MED GY IP 250 OP 636 PS 637: Performed by: ANESTHESIOLOGY

## 2022-11-28 PROCEDURE — 710N000009 HC RECOVERY PHASE 1, LEVEL 1, PER MIN: Performed by: ORTHOPAEDIC SURGERY

## 2022-11-28 PROCEDURE — 85025 COMPLETE CBC W/AUTO DIFF WBC: CPT | Performed by: ORTHOPAEDIC SURGERY

## 2022-11-28 PROCEDURE — 360N000077 HC SURGERY LEVEL 4, PER MIN: Performed by: ORTHOPAEDIC SURGERY

## 2022-11-28 PROCEDURE — 97530 THERAPEUTIC ACTIVITIES: CPT | Mod: GP | Performed by: PHYSICAL THERAPIST

## 2022-11-28 PROCEDURE — 36415 COLL VENOUS BLD VENIPUNCTURE: CPT | Performed by: ORTHOPAEDIC SURGERY

## 2022-11-28 PROCEDURE — 250N000009 HC RX 250: Performed by: NURSE ANESTHETIST, CERTIFIED REGISTERED

## 2022-11-28 PROCEDURE — 370N000017 HC ANESTHESIA TECHNICAL FEE, PER MIN: Performed by: ORTHOPAEDIC SURGERY

## 2022-11-28 PROCEDURE — 250N000011 HC RX IP 250 OP 636: Performed by: ANESTHESIOLOGY

## 2022-11-28 DEVICE — IMPLANTABLE DEVICE: Type: IMPLANTABLE DEVICE | Site: HIP | Status: FUNCTIONAL

## 2022-11-28 DEVICE — IMPLANTABLE DEVICE
Type: IMPLANTABLE DEVICE | Site: HIP | Status: FUNCTIONAL
Brand: G7® LONGEVITY®

## 2022-11-28 DEVICE — IMPLANTABLE DEVICE
Type: IMPLANTABLE DEVICE | Site: HIP | Status: FUNCTIONAL
Brand: G7® ACETABULAR SYSTEM

## 2022-11-28 DEVICE — BIOLOX® DELTA, CERAMIC FEMORAL HEAD, M, Ø 36/0, TAPER 12/14
Type: IMPLANTABLE DEVICE | Site: HIP | Status: FUNCTIONAL
Brand: BIOLOX® DELTA

## 2022-11-28 RX ORDER — FENTANYL CITRATE 50 UG/ML
INJECTION, SOLUTION INTRAMUSCULAR; INTRAVENOUS PRN
Status: DISCONTINUED | OUTPATIENT
Start: 2022-11-28 | End: 2022-11-28

## 2022-11-28 RX ORDER — FENTANYL CITRATE 0.05 MG/ML
25 INJECTION, SOLUTION INTRAMUSCULAR; INTRAVENOUS EVERY 5 MIN PRN
Status: DISCONTINUED | OUTPATIENT
Start: 2022-11-28 | End: 2022-11-28 | Stop reason: HOSPADM

## 2022-11-28 RX ORDER — AMOXICILLIN 250 MG
1-2 CAPSULE ORAL 2 TIMES DAILY
Qty: 30 TABLET | Refills: 0 | Status: SHIPPED | OUTPATIENT
Start: 2022-11-28 | End: 2023-06-07

## 2022-11-28 RX ORDER — POLYETHYLENE GLYCOL 3350 17 G/17G
17 POWDER, FOR SOLUTION ORAL DAILY
Status: DISCONTINUED | OUTPATIENT
Start: 2022-11-29 | End: 2022-12-01 | Stop reason: HOSPADM

## 2022-11-28 RX ORDER — OXYCODONE HYDROCHLORIDE 5 MG/1
5 TABLET ORAL EVERY 4 HOURS PRN
Status: DISCONTINUED | OUTPATIENT
Start: 2022-11-28 | End: 2022-12-01 | Stop reason: HOSPADM

## 2022-11-28 RX ORDER — LISINOPRIL 10 MG/1
10 TABLET ORAL DAILY
Status: DISCONTINUED | OUTPATIENT
Start: 2022-11-28 | End: 2022-11-28

## 2022-11-28 RX ORDER — VANCOMYCIN HYDROCHLORIDE 1 G/20ML
INJECTION, POWDER, LYOPHILIZED, FOR SOLUTION INTRAVENOUS PRN
Status: DISCONTINUED | OUTPATIENT
Start: 2022-11-28 | End: 2022-11-28 | Stop reason: HOSPADM

## 2022-11-28 RX ORDER — HYDROMORPHONE HCL IN WATER/PF 6 MG/30 ML
0.4 PATIENT CONTROLLED ANALGESIA SYRINGE INTRAVENOUS
Status: DISCONTINUED | OUTPATIENT
Start: 2022-11-28 | End: 2022-12-01 | Stop reason: HOSPADM

## 2022-11-28 RX ORDER — DEXAMETHASONE SODIUM PHOSPHATE 4 MG/ML
INJECTION, SOLUTION INTRA-ARTICULAR; INTRALESIONAL; INTRAMUSCULAR; INTRAVENOUS; SOFT TISSUE PRN
Status: DISCONTINUED | OUTPATIENT
Start: 2022-11-28 | End: 2022-11-28

## 2022-11-28 RX ORDER — LIDOCAINE 40 MG/G
CREAM TOPICAL
Status: DISCONTINUED | OUTPATIENT
Start: 2022-11-28 | End: 2022-12-01 | Stop reason: HOSPADM

## 2022-11-28 RX ORDER — HYDROXYUREA 500 MG/1
500 CAPSULE ORAL DAILY
Status: DISCONTINUED | OUTPATIENT
Start: 2022-11-28 | End: 2022-12-01 | Stop reason: HOSPADM

## 2022-11-28 RX ORDER — CEFAZOLIN SODIUM/WATER 2 G/20 ML
2 SYRINGE (ML) INTRAVENOUS
Status: COMPLETED | OUTPATIENT
Start: 2022-11-28 | End: 2022-11-28

## 2022-11-28 RX ORDER — ASPIRIN 81 MG/1
81 TABLET ORAL DAILY
COMMUNITY
Start: 2022-11-28

## 2022-11-28 RX ORDER — NALOXONE HYDROCHLORIDE 0.4 MG/ML
0.2 INJECTION, SOLUTION INTRAMUSCULAR; INTRAVENOUS; SUBCUTANEOUS
Status: DISCONTINUED | OUTPATIENT
Start: 2022-11-28 | End: 2022-12-01 | Stop reason: HOSPADM

## 2022-11-28 RX ORDER — ACETAMINOPHEN 325 MG/1
650 TABLET ORAL EVERY 4 HOURS PRN
Status: DISCONTINUED | OUTPATIENT
Start: 2022-12-01 | End: 2022-12-01 | Stop reason: HOSPADM

## 2022-11-28 RX ORDER — TRANEXAMIC ACID 650 MG/1
1950 TABLET ORAL ONCE
Status: DISCONTINUED | OUTPATIENT
Start: 2022-11-28 | End: 2022-11-28 | Stop reason: HOSPADM

## 2022-11-28 RX ORDER — ACETAMINOPHEN 325 MG/1
975 TABLET ORAL EVERY 8 HOURS
Status: COMPLETED | OUTPATIENT
Start: 2022-11-28 | End: 2022-12-01

## 2022-11-28 RX ORDER — NEOSTIGMINE METHYLSULFATE 1 MG/ML
VIAL (ML) INJECTION PRN
Status: DISCONTINUED | OUTPATIENT
Start: 2022-11-28 | End: 2022-11-28

## 2022-11-28 RX ORDER — OXYCODONE HYDROCHLORIDE 5 MG/1
5-10 TABLET ORAL EVERY 4 HOURS PRN
Qty: 25 TABLET | Refills: 0 | Status: SHIPPED | OUTPATIENT
Start: 2022-11-28 | End: 2022-11-30

## 2022-11-28 RX ORDER — HYDROMORPHONE HCL IN WATER/PF 6 MG/30 ML
0.2 PATIENT CONTROLLED ANALGESIA SYRINGE INTRAVENOUS
Status: DISCONTINUED | OUTPATIENT
Start: 2022-11-28 | End: 2022-12-01 | Stop reason: HOSPADM

## 2022-11-28 RX ORDER — MAGNESIUM HYDROXIDE 1200 MG/15ML
LIQUID ORAL PRN
Status: DISCONTINUED | OUTPATIENT
Start: 2022-11-28 | End: 2022-11-28 | Stop reason: HOSPADM

## 2022-11-28 RX ORDER — GABAPENTIN 100 MG/1
100 CAPSULE ORAL AT BEDTIME
Status: DISCONTINUED | OUTPATIENT
Start: 2022-11-28 | End: 2022-12-01 | Stop reason: HOSPADM

## 2022-11-28 RX ORDER — PROCHLORPERAZINE MALEATE 5 MG
5 TABLET ORAL EVERY 6 HOURS PRN
Status: DISCONTINUED | OUTPATIENT
Start: 2022-11-28 | End: 2022-12-01 | Stop reason: HOSPADM

## 2022-11-28 RX ORDER — HYDROXYZINE HYDROCHLORIDE 10 MG/1
10 TABLET, FILM COATED ORAL EVERY 6 HOURS PRN
Status: DISCONTINUED | OUTPATIENT
Start: 2022-11-28 | End: 2022-12-01 | Stop reason: HOSPADM

## 2022-11-28 RX ORDER — ATORVASTATIN CALCIUM 10 MG/1
10 TABLET, FILM COATED ORAL DAILY
Status: DISCONTINUED | OUTPATIENT
Start: 2022-11-28 | End: 2022-12-01 | Stop reason: HOSPADM

## 2022-11-28 RX ORDER — ONDANSETRON 2 MG/ML
4 INJECTION INTRAMUSCULAR; INTRAVENOUS EVERY 6 HOURS PRN
Status: DISCONTINUED | OUTPATIENT
Start: 2022-11-28 | End: 2022-12-01 | Stop reason: HOSPADM

## 2022-11-28 RX ORDER — ONDANSETRON 4 MG/1
4 TABLET, ORALLY DISINTEGRATING ORAL EVERY 30 MIN PRN
Status: DISCONTINUED | OUTPATIENT
Start: 2022-11-28 | End: 2022-11-28 | Stop reason: HOSPADM

## 2022-11-28 RX ORDER — PROPOFOL 10 MG/ML
INJECTION, EMULSION INTRAVENOUS CONTINUOUS PRN
Status: DISCONTINUED | OUTPATIENT
Start: 2022-11-28 | End: 2022-11-28

## 2022-11-28 RX ORDER — CEFAZOLIN SODIUM/WATER 2 G/20 ML
2 SYRINGE (ML) INTRAVENOUS SEE ADMIN INSTRUCTIONS
Status: DISCONTINUED | OUTPATIENT
Start: 2022-11-28 | End: 2022-11-28 | Stop reason: HOSPADM

## 2022-11-28 RX ORDER — LIDOCAINE 40 MG/G
CREAM TOPICAL
Status: DISCONTINUED | OUTPATIENT
Start: 2022-11-28 | End: 2022-11-28 | Stop reason: HOSPADM

## 2022-11-28 RX ORDER — ONDANSETRON 2 MG/ML
INJECTION INTRAMUSCULAR; INTRAVENOUS PRN
Status: DISCONTINUED | OUTPATIENT
Start: 2022-11-28 | End: 2022-11-28

## 2022-11-28 RX ORDER — DIPHENHYDRAMINE HCL 12.5MG/5ML
12.5 LIQUID (ML) ORAL EVERY 6 HOURS PRN
Status: DISCONTINUED | OUTPATIENT
Start: 2022-11-28 | End: 2022-12-01 | Stop reason: HOSPADM

## 2022-11-28 RX ORDER — APREPITANT 40 MG/1
40 CAPSULE ORAL ONCE
Status: COMPLETED | OUTPATIENT
Start: 2022-11-28 | End: 2022-11-28

## 2022-11-28 RX ORDER — BISACODYL 10 MG
10 SUPPOSITORY, RECTAL RECTAL DAILY PRN
Status: DISCONTINUED | OUTPATIENT
Start: 2022-11-28 | End: 2022-12-01 | Stop reason: HOSPADM

## 2022-11-28 RX ORDER — FENTANYL CITRATE 0.05 MG/ML
50 INJECTION, SOLUTION INTRAMUSCULAR; INTRAVENOUS EVERY 5 MIN PRN
Status: DISCONTINUED | OUTPATIENT
Start: 2022-11-28 | End: 2022-11-28 | Stop reason: HOSPADM

## 2022-11-28 RX ORDER — ONDANSETRON 4 MG/1
4 TABLET, ORALLY DISINTEGRATING ORAL EVERY 6 HOURS PRN
Status: DISCONTINUED | OUTPATIENT
Start: 2022-11-28 | End: 2022-12-01 | Stop reason: HOSPADM

## 2022-11-28 RX ORDER — METHOCARBAMOL 500 MG/1
500 TABLET, FILM COATED ORAL EVERY 6 HOURS PRN
Status: DISCONTINUED | OUTPATIENT
Start: 2022-11-28 | End: 2022-12-01 | Stop reason: HOSPADM

## 2022-11-28 RX ORDER — ACETAMINOPHEN 325 MG/1
650 TABLET ORAL EVERY 4 HOURS PRN
Qty: 100 TABLET | Refills: 0 | Status: SHIPPED | OUTPATIENT
Start: 2022-11-28 | End: 2023-06-07

## 2022-11-28 RX ORDER — SODIUM CHLORIDE, SODIUM LACTATE, POTASSIUM CHLORIDE, CALCIUM CHLORIDE 600; 310; 30; 20 MG/100ML; MG/100ML; MG/100ML; MG/100ML
INJECTION, SOLUTION INTRAVENOUS CONTINUOUS
Status: DISCONTINUED | OUTPATIENT
Start: 2022-11-28 | End: 2022-11-28 | Stop reason: HOSPADM

## 2022-11-28 RX ORDER — ONDANSETRON 2 MG/ML
4 INJECTION INTRAMUSCULAR; INTRAVENOUS EVERY 30 MIN PRN
Status: DISCONTINUED | OUTPATIENT
Start: 2022-11-28 | End: 2022-11-28 | Stop reason: HOSPADM

## 2022-11-28 RX ORDER — CEFAZOLIN SODIUM 1 G/3ML
1 INJECTION, POWDER, FOR SOLUTION INTRAMUSCULAR; INTRAVENOUS EVERY 8 HOURS
Status: COMPLETED | OUTPATIENT
Start: 2022-11-28 | End: 2022-11-29

## 2022-11-28 RX ORDER — GLYCOPYRROLATE 0.2 MG/ML
INJECTION, SOLUTION INTRAMUSCULAR; INTRAVENOUS PRN
Status: DISCONTINUED | OUTPATIENT
Start: 2022-11-28 | End: 2022-11-28

## 2022-11-28 RX ORDER — SODIUM CHLORIDE, SODIUM LACTATE, POTASSIUM CHLORIDE, CALCIUM CHLORIDE 600; 310; 30; 20 MG/100ML; MG/100ML; MG/100ML; MG/100ML
INJECTION, SOLUTION INTRAVENOUS CONTINUOUS
Status: DISCONTINUED | OUTPATIENT
Start: 2022-11-28 | End: 2022-12-01 | Stop reason: HOSPADM

## 2022-11-28 RX ORDER — OXYCODONE HYDROCHLORIDE 5 MG/1
10 TABLET ORAL EVERY 4 HOURS PRN
Status: DISCONTINUED | OUTPATIENT
Start: 2022-11-28 | End: 2022-12-01 | Stop reason: HOSPADM

## 2022-11-28 RX ORDER — LIDOCAINE HYDROCHLORIDE 20 MG/ML
INJECTION, SOLUTION INFILTRATION; PERINEURAL PRN
Status: DISCONTINUED | OUTPATIENT
Start: 2022-11-28 | End: 2022-11-28

## 2022-11-28 RX ORDER — HYDROMORPHONE HCL IN WATER/PF 6 MG/30 ML
0.4 PATIENT CONTROLLED ANALGESIA SYRINGE INTRAVENOUS EVERY 5 MIN PRN
Status: DISCONTINUED | OUTPATIENT
Start: 2022-11-28 | End: 2022-11-28 | Stop reason: HOSPADM

## 2022-11-28 RX ORDER — NALOXONE HYDROCHLORIDE 0.4 MG/ML
0.4 INJECTION, SOLUTION INTRAMUSCULAR; INTRAVENOUS; SUBCUTANEOUS
Status: DISCONTINUED | OUTPATIENT
Start: 2022-11-28 | End: 2022-12-01 | Stop reason: HOSPADM

## 2022-11-28 RX ORDER — HYDROXYZINE HYDROCHLORIDE 10 MG/1
10 TABLET, FILM COATED ORAL EVERY 6 HOURS PRN
Qty: 30 TABLET | Refills: 0 | Status: SHIPPED | OUTPATIENT
Start: 2022-11-28 | End: 2022-11-30

## 2022-11-28 RX ORDER — PROPOFOL 10 MG/ML
INJECTION, EMULSION INTRAVENOUS PRN
Status: DISCONTINUED | OUTPATIENT
Start: 2022-11-28 | End: 2022-11-28

## 2022-11-28 RX ORDER — AMOXICILLIN 250 MG
1 CAPSULE ORAL 2 TIMES DAILY
Status: DISCONTINUED | OUTPATIENT
Start: 2022-11-28 | End: 2022-12-01 | Stop reason: HOSPADM

## 2022-11-28 RX ORDER — LISINOPRIL 10 MG/1
10 TABLET ORAL DAILY
Status: DISCONTINUED | OUTPATIENT
Start: 2022-11-29 | End: 2022-12-01 | Stop reason: HOSPADM

## 2022-11-28 RX ORDER — HYDROMORPHONE HCL IN WATER/PF 6 MG/30 ML
0.2 PATIENT CONTROLLED ANALGESIA SYRINGE INTRAVENOUS EVERY 5 MIN PRN
Status: DISCONTINUED | OUTPATIENT
Start: 2022-11-28 | End: 2022-11-28 | Stop reason: HOSPADM

## 2022-11-28 RX ADMIN — PHENYLEPHRINE HYDROCHLORIDE 100 MCG: 10 INJECTION INTRAVENOUS at 12:10

## 2022-11-28 RX ADMIN — PHENYLEPHRINE HYDROCHLORIDE 100 MCG: 10 INJECTION INTRAVENOUS at 12:12

## 2022-11-28 RX ADMIN — ONDANSETRON 4 MG: 2 INJECTION INTRAMUSCULAR; INTRAVENOUS at 12:04

## 2022-11-28 RX ADMIN — GLYCOPYRROLATE 0.2 MG: 0.2 INJECTION, SOLUTION INTRAMUSCULAR; INTRAVENOUS at 10:53

## 2022-11-28 RX ADMIN — FENTANYL CITRATE 25 MCG: 50 INJECTION, SOLUTION INTRAMUSCULAR; INTRAVENOUS at 13:25

## 2022-11-28 RX ADMIN — METHOCARBAMOL 500 MG: 500 TABLET ORAL at 16:13

## 2022-11-28 RX ADMIN — SODIUM CHLORIDE, POTASSIUM CHLORIDE, SODIUM LACTATE AND CALCIUM CHLORIDE: 600; 310; 30; 20 INJECTION, SOLUTION INTRAVENOUS at 16:14

## 2022-11-28 RX ADMIN — FENTANYL CITRATE 50 MCG: 50 INJECTION, SOLUTION INTRAMUSCULAR; INTRAVENOUS at 10:45

## 2022-11-28 RX ADMIN — APREPITANT 40 MG: 40 CAPSULE ORAL at 09:21

## 2022-11-28 RX ADMIN — FENTANYL CITRATE 25 MCG: 50 INJECTION, SOLUTION INTRAMUSCULAR; INTRAVENOUS at 13:15

## 2022-11-28 RX ADMIN — SODIUM CHLORIDE, POTASSIUM CHLORIDE, SODIUM LACTATE AND CALCIUM CHLORIDE: 600; 310; 30; 20 INJECTION, SOLUTION INTRAVENOUS at 08:21

## 2022-11-28 RX ADMIN — ATORVASTATIN CALCIUM 10 MG: 10 TABLET, FILM COATED ORAL at 16:19

## 2022-11-28 RX ADMIN — CEFAZOLIN 1 G: 1 INJECTION, POWDER, FOR SOLUTION INTRAMUSCULAR; INTRAVENOUS at 18:20

## 2022-11-28 RX ADMIN — LIDOCAINE HYDROCHLORIDE 100 MG: 20 INJECTION, SOLUTION INFILTRATION; PERINEURAL at 10:33

## 2022-11-28 RX ADMIN — PHENYLEPHRINE HYDROCHLORIDE 0.2 MCG/KG/MIN: 10 INJECTION INTRAVENOUS at 10:57

## 2022-11-28 RX ADMIN — Medication 2 G: at 10:30

## 2022-11-28 RX ADMIN — ROCURONIUM BROMIDE 30 MG: 50 INJECTION, SOLUTION INTRAVENOUS at 10:33

## 2022-11-28 RX ADMIN — GLYCOPYRROLATE 0.3 MG: 0.2 INJECTION, SOLUTION INTRAMUSCULAR; INTRAVENOUS at 12:11

## 2022-11-28 RX ADMIN — HYDROMORPHONE HYDROCHLORIDE 0.2 MG: 0.2 INJECTION, SOLUTION INTRAMUSCULAR; INTRAVENOUS; SUBCUTANEOUS at 13:45

## 2022-11-28 RX ADMIN — PROPOFOL 30 MCG/KG/MIN: 10 INJECTION, EMULSION INTRAVENOUS at 11:04

## 2022-11-28 RX ADMIN — DEXAMETHASONE SODIUM PHOSPHATE 10 MG: 4 INJECTION, SOLUTION INTRA-ARTICULAR; INTRALESIONAL; INTRAMUSCULAR; INTRAVENOUS; SOFT TISSUE at 10:50

## 2022-11-28 RX ADMIN — FENTANYL CITRATE 50 MCG: 50 INJECTION, SOLUTION INTRAMUSCULAR; INTRAVENOUS at 11:06

## 2022-11-28 RX ADMIN — HYDROMORPHONE HYDROCHLORIDE 0.2 MG: 0.2 INJECTION, SOLUTION INTRAMUSCULAR; INTRAVENOUS; SUBCUTANEOUS at 21:05

## 2022-11-28 RX ADMIN — PROPOFOL 110 MG: 10 INJECTION, EMULSION INTRAVENOUS at 10:33

## 2022-11-28 RX ADMIN — NEOSTIGMINE METHYLSULFATE 2 MG: 1 INJECTION, SOLUTION INTRAVENOUS at 12:11

## 2022-11-28 RX ADMIN — SODIUM CHLORIDE, POTASSIUM CHLORIDE, SODIUM LACTATE AND CALCIUM CHLORIDE: 600; 310; 30; 20 INJECTION, SOLUTION INTRAVENOUS at 12:54

## 2022-11-28 RX ADMIN — ASPIRIN 325 MG: 325 TABLET, COATED ORAL at 16:13

## 2022-11-28 RX ADMIN — ACETAMINOPHEN 975 MG: 325 TABLET, FILM COATED ORAL at 16:13

## 2022-11-28 RX ADMIN — GABAPENTIN 100 MG: 100 CAPSULE ORAL at 21:06

## 2022-11-28 RX ADMIN — PHENYLEPHRINE HYDROCHLORIDE 100 MCG: 10 INJECTION INTRAVENOUS at 10:53

## 2022-11-28 RX ADMIN — SENNOSIDES AND DOCUSATE SODIUM 1 TABLET: 50; 8.6 TABLET ORAL at 21:06

## 2022-11-28 RX ADMIN — HYDROXYUREA 500 MG: 500 CAPSULE ORAL at 21:44

## 2022-11-28 RX ADMIN — PHENYLEPHRINE HYDROCHLORIDE 100 MCG: 10 INJECTION INTRAVENOUS at 11:36

## 2022-11-28 ASSESSMENT — ACTIVITIES OF DAILY LIVING (ADL)
ADLS_ACUITY_SCORE: 28
DIFFICULTY_EATING/SWALLOWING: YES
ADLS_ACUITY_SCORE: 28
ADLS_ACUITY_SCORE: 28
EATING/SWALLOWING: SWALLOWING SOLID FOOD
EQUIPMENT_CURRENTLY_USED_AT_HOME: WALKER, ROLLING
ADLS_ACUITY_SCORE: 28
ADLS_ACUITY_SCORE: 28

## 2022-11-28 NOTE — PLAN OF CARE
[facility]  OUTPATIENT PHYSICAL THERAPY EVALUATION  PLAN OF TREATMENT FOR OUTPATIENT REHABILITATION  (COMPLETE FOR INITIAL CLAIMS ONLY)  Patient's Last Name, First Name, M.I.  YOB: 1935  OrlinMejia  AKIRA                        Provider's Name  [facility] Medical Record No.  [id number]                             Onset Date:  11/28/22   Start of Care Date:      Type:     _X_PT   ___OT   ___SLP Medical Diagnosis:                 PT Diagnosis:  impaired mobility Visits from SOC:  1     See note for plan of treatment, functional goals and certification details    I CERTIFY THE NEED FOR THESE SERVICES FURNISHED UNDER        THIS PLAN OF TREATMENT AND WHILE UNDER MY CARE     (Physician co-signature of this document indicates review and certification of the therapy plan).

## 2022-11-28 NOTE — BRIEF OP NOTE
[facility]    Brief Operative Note    Pre-operative diagnosis: Primary osteoarthritis of right hip [M16.11]  Post-operative diagnosis Same as pre-operative diagnosis    Procedure: Procedure(s):  RIGHT DIRECT ANTERIOR TOTAL HIP  ARTHROPLASTY  Surgeon: Surgeon(s) and Role:     * Carter Diaz MD - Primary  Anesthesia: General   Estimated Blood Loss: 300 ml    Drains: None  Specimens: * No specimens in log *  Findings:   Advanced DJD right knee with eburnation and flattening of the femoral head and fracturing of the superior rim of the acetabulum.  Complications: None.  Implants:   Implant Name Type Inv. Item Serial No.  Lot No. LRB No. Used Action   SHELL G7 OSSEO TI ACETABULAR 50MM - [id number] Total Joint Component/Insert SHELL G7 OSSEO TI ACETABULAR 50MM  [employer] [id number] Right 1 Implanted   IMP SCR ZIM 6.5X15MM ACET CUP SELF TAP -433-15 - [id number] Metallic Hardware/[place] IMP SCR ZIM 6.5X15MM ACET CUP SELF TAP -911-15  [employer] [id number] Right 1 Implanted   G7 ACETABULARE SYSTEM LONGEVITY HIGHLY CROSSLINKED POLYETHYLENE LINER Prosthesis   [employer] [id number] Right 1 Implanted   IMP SCR ZIM 6.5X20MM ACET CUP SELF TAP -362-20 - [id number] Metallic Hardware/[place] IMP SCR ZIM 6.5X20MM ACET CUP SELF TAP -153-20  [employer] [id number] Right 1 Implanted   IMP STEM FEM TAPER ZIM STD OFFSET [id number] -283-10 - [id number] Total Joint Component/Insert IMP STEM FEM TAPER ZIM STD OFFSET [id number] -698-10  [employer] [id number] Right 1 Implanted   IMP HEAD FEM ZIM BIOLOX DELTA CER 36MM +0 -212-02 - [id number] Total Joint Component/Insert IMP HEAD FEM ZIM BIOLOX DELTA CER 36MM +0 -334-02  [employer] [id number] Right 1 Implanted

## 2022-11-28 NOTE — ANESTHESIA PROCEDURE NOTES
Airway       Patient location during procedure: OR       Procedure Start/Stop Times: 11/28/2022 10:35 AM  Staff -        Anesthesiologist:  Carter Benitez MD       CRNA: Vera Bush APRN CRNA       Performed By: anesthesiologist  Consent for Airway        Urgency: elective  Indications and Patient Condition       Indications for airway management: nohemy-procedural       Induction type:intravenous       Mask difficulty assessment: 1 - vent by mask    Final Airway Details       Final airway type: endotracheal airway       Successful airway: ETT - single  Endotracheal Airway Details        ETT size (mm): 7.0       Cuffed: yes       Successful intubation technique: video laryngoscopy       VL Blade Size: Glidescope 3       Grade View of Cords: 1       Adjucts: stylet       Position: Right       Measured from: lips       Secured at (cm): 22       Bite block used: None    Post intubation assessment        Placement verified by: capnometry, equal breath sounds and chest rise        Number of attempts at approach: 1       Secured with: pink tape       Ease of procedure: easy       Dentition: Unchanged    Medication(s) Administered   Medication Administration Time: 11/28/2022 10:35 AM

## 2022-11-28 NOTE — PROGRESS NOTES
11/28/22 1615   Appointment Info   Signing Clinician's Name / Credentials (PT) Alivia Millan DPT   Rehab Comments (PT) WBAT R LE, no hip precautions   Living Environment   People in Home alone   Current Living Arrangements house   Home Accessibility stairs to enter home;stairs within home   Number of Stairs, Main Entrance 2   Stair Railings, Main Entrance none   Number of Stairs, Within Home, Primary greater than 10 stairs  (15)   Stair Railings, Within Home, Primary railings safe and in good condition   Transportation Anticipated family or friend will provide   Self-Care   Usual Activity Tolerance good   Current Activity Tolerance fair   Equipment Currently Used at Home walker, rolling   Fall history within last six months no   Activity/Exercise/Self-Care Comment Family med set-up and driving, otherwise ind with FWW with ADLs and mobility   General Information   Onset of Illness/Injury or Date of Surgery 11/28/22   Referring Physician Carter Diaz MD   Patient/Family Therapy Goals Statement (PT) return home, get home therapies per ariana   Pertinent History of Current Problem (include personal factors and/or comorbidities that impact the POC) s/p R MARGI anterior approach   Weight-Bearing Status - RLE weight-bearing as tolerated   Cognition   Affect/Mental Status (Cognition) low arousal/lethargic   Orientation Status (Cognition) oriented x 3   Pain Assessment   Patient Currently in Pain Yes, see Vital Sign flowsheet  (R hip pain)   Range of Motion (ROM)   Range of Motion ROM deficits secondary to weakness   Strength (Manual Muscle Testing)   Strength (Manual Muscle Testing) Deficits observed during functional mobility   Bed Mobility   Comment, (Bed Mobility) Min A supine <> sit   Transfers   Comment, (Transfers) Min A sit <> stand with FWW   Gait/Stairs (Locomotion)   [place] Level (Gait) minimum assist (75% patient effort)   Assistive Device (Gait) walker, front-wheeled   Distance in Feet (Required  for LE Total Joints) 15'   Balance   Balance Comments fall risk, requires FWW and A x 1-2   Sensory Examination   Sensory Perception patient reports no sensory changes   Clinical Impression   Criteria for Skilled Therapeutic Intervention Yes, treatment indicated   PT Diagnosis (PT) impaired mobility   Influenced by the following impairments pain, weakness, impaired balance   Functional limitations due to impairments fall risk, decreased activity tolerance   Clinical Presentation (PT Evaluation Complexity) Stable/Uncomplicated   Clinical Presentation Rationale clinical judgement   Clinical Decision Making (Complexity) low complexity   Planned Therapy Interventions (PT) balance training;bed mobility training;gait training;neuromuscular re-education;strengthening;stair training;transfer training;progressive activity/exercise   Anticipated Equipment Needs at Discharge (PT) walker, rolling   Risk & Benefits of therapy have been explained evaluation/treatment results reviewed;care plan/treatment goals reviewed;risks/benefits reviewed;current/potential barriers reviewed;participants voiced agreement with care plan;participants included;patient   PT Total Evaluation Time   PT Eval, Low Complexity Minutes (97891) 15   Plan of Care Review   Plan of Care Reviewed With patient   Physical Therapy Goals   PT Frequency 2x/day   PT Predicted Duration/Target Date for Goal Attainment 12/02/22   PT Goals Bed Mobility;Transfers;Gait;Stairs   PT: Bed Mobility Supervision/stand-by assist;Supine to/from sit   PT: Transfers Supervision/stand-by assist;Sit to/from stand;Bed to/from chair;Assistive device   PT: Gait Supervision/stand-by assist;Rolling walker;100 feet   PT: Stairs Minimal assist;Greater than 10 stairs;Rail on right;Rail on left   PT Discharge Planning   PT Plan progress OOB with FWW, LE exercises, stairs   PT Rationale for DC Rec Pt and family requesting home PT/OT to address functional mobility and limitations in the home  (originally wanting TCU). Pt currently A x 1 with FWW but limited to hypotension and poor activity tolerance. Will continue to assess.   PT Brief overview of current status low BP, A x 1 with FWW short distance ambualtion ~ 15 feet   Total Session Time   Total Session Time (sum of timed and untimed services) 15

## 2022-11-28 NOTE — ANESTHESIA CARE TRANSFER NOTE
Patient: Mejia Ordaz    Procedure: Procedure(s):  RIGHT DIRECT ANTERIOR TOTAL HIP  ARTHROPLASTY       Diagnosis: Primary osteoarthritis of right hip [M16.11]  Diagnosis Additional Information: No value filed.    Anesthesia Type:   General     Note:    Oropharynx: oropharynx clear of all foreign objects and spontaneously breathing  Level of Consciousness: awake  Oxygen Supplementation: face mask  Level of Supplemental Oxygen (L/min / FiO2): 6  Independent Airway: airway patency satisfactory and stable  Dentition: dentition unchanged  Vital Signs Stable: post-procedure vital signs reviewed and stable  Report to RN Given: handoff report given  Patient transferred to: PACU  Comments: Neuromuscular blockade reversed after TOF 4/4, spontaneous respirations, adequate tidal volumes, followed commands to voice, oropharynx suctioned with soft flexible catheter, extubated atraumatically, extubated with suction, airway patent after extubation.  Oxygen via facemask at 6 liters per minute to PACU. Oxygen tubing connected to wall O2 in PACU, SpO2, NiBP, and EKG monitors and alarms on and functioning, Renu Hugger warmer connected to patient gown, report on patient's clinical status given to PACU RN, RN questions answered.     Handoff Report: Identifed the Patient, Identified the Reponsible Provider, Reviewed the pertinent medical history, Discussed the surgical course, Reviewed Intra-OP anesthesia mangement and issues during anesthesia, Set expectations for post-procedure period and Allowed opportunity for questions and acknowledgement of understanding      Vitals:  Vitals Value Taken Time   BP     Temp     Pulse 67 11/28/22 1253   Resp 11 11/28/22 1253   SpO2 99 % 11/28/22 1253   Vitals shown include unvalidated device data.    Electronically Signed By: KEVIN Mandel CRNA  November 28, 2022  12:55 PM

## 2022-11-28 NOTE — CONSULTS
[facility]  BRIEF HOSPITALIST CONSULT NOTE- Hospitalist Service     Date of Admission:  11/28/2022  Consult Requested by: Dr. Diaz  Reason for Consult: Post-op co-medical management.    PRIMARY CARE PROVIDER:    No Ref-Primary, Physician    Assessment & Plan   Mejia Ordaz is a 87 year old female admitted on 11/28/2022.    Past medical history significant for OA, Osteoporosis, HTN, HLP, CKD stage 2, Thrombocythemia, Trigeminal neuralgia, Giant Cell arteritis, Myeloproliferative disease, History of ESBL infection who underwent an elective Right MARGI    EMR was reviewed that included pre-op H&P and PTA medications.  Vital signs within normal limits.  Formal consult will be deferred.  Please see outlined plan below.  Admission and Discharge PTA (Home) medication reconciliation has been completed except for Flexeril and Celebrex.  Hospitalist will sign off.  Please call or reconsult if any questions or concerns arise.     OA with degenerative changes of the right hip s/p right MARGI  POD #0.   - Orthopedic Surgery is managing.   --Defer analgesic management, DVT prophylaxis, PT/OT.    - HGB check in the morning.    - Encourage utilization of incentive spirometer.     Osteoporosis  Noted per pre-op H&P.  Not currently on any medications.      HTN  - Resumed on PTA lisinopril 10 mg/d.  Hold parameters in place.    - Check creatinine in the morning.      HLP  - Resumed on PTA Lipitor 10 mg/d.      CKD stage 2  - BMP in the morning.      Thrombocythemia, Essential  - Resumed on PTA hydroxyurea 500 mg/d.    - Defer resumption of ASA 81 mg/d to Ortho.     Trigeminal neuralgia  - Resumed on PTA gabapentin 100 mg at bedtime.      Giant Cell arteritis  No longer on prednisone and no interventions neccessary at this time.      Myeloproliferative disease  Continue to follow with outpatient Hematology/Oncology.  No interventions at this time.      History of ESBL infection  2018 urine infection.    -  Contact isolation.      Clinically Significant Risk Factors Present on Admission                  # Hypertension: home medication list includes antihypertensive(s)               Diet: Advance Diet as Tolerated: Regular Diet Adult  Discharge Instruction - Regular Diet Adult   DVT Prophylaxis: Defer to primary service   Garcia Catheter: Not present  Code Status: Full Code; per Ortho.     Disposition Plan    Per Ortho.           JOSI Aguayo [facility]  Securely message with the Vocera Web Console (learn more here)  Text page via [employer] Paging/Directory

## 2022-11-28 NOTE — OP NOTE
Procedure Date: 11/28/2022    PREOPERATIVE DIAGNOSIS:  Advanced degenerative arthritis of the right hip.    POSTOPERATIVE DIAGNOSIS:  Advanced degenerative arthritis of the right hip.    PROCEDURE:  Right direct anterior total hip arthroplasty.    SURGEON:  Carter Diaz M.D.    FIRST ASSISTANT:  CHARLEY Encarnacion.    PROCEDURE IN DETAIL:  The patient was brought to the operating room, given a general anesthetic.  She was placed supine on the radiolucent operating table and her right hip and right lower extremity were then prepped and draped in sterile fashion.  An incision was made over the anterior aspect of the hip.  This was carried down to subcutaneous tissues down to the fascia.  The fascia was incised longitudinally and an interval between the tensor fascia phylicia and sartorius was developed deep.  The rectus muscle was reflected medially, exposing the circumflex vessels, which were cauterized.  The hip capsule was exposed.  We excised the anterior capsule, exposing the femoral head and neck.  The femoral neck was then cut with a saw at the level of our preoperatively templated osteotomy level and then the head and neck were removed with a threaded Steinmann pin.  The femoral head was quite worn and flattened consistent with her preoperative x-rays.  The acetabulum had some fracturing along the anterior and superior rim, creating a fairly shallow oblong-shaped acetabulum.  We at this point, carefully reamed the acetabulum to 49 mm.  A 50 mm Biomet G7 cup was then impacted into place and that appeared to have a good fixation despite her bony deficiency.  We supplemented this with 2 screws, both of which had good fixation as well.  We then snapped a standard highly crosslink polyethylene liner to accept a size 36 head into the acetabular component.  Attention was then turned to the femur.  The piriformis was released, allowing excellent exposure of the opening of the femoral canal.  Her femur was  exceedingly small.  We opened the canal with a starter reamer and then lateralized with a lateralizing reamer and then we broached the hip with a size 4 broach and this appeared to be as large as we would be able to go.  We trialed the hip with a size 4 M/L taper broach in place and it appeared that a standard offset neck would be sufficient.  A real size 4 M/L taper stem with a standard offset neck was then impacted into the femur.  This had excellent fixation.  We then trialed the hip and it appeared that a +0 neck gave the best fit.  A real 36+0 ceramic head was then impacted onto the stem.  The hip was relocated.  The soft tissue tension appeared satisfactory.  The hip was stable through full range of motion.  The leg length and offset appeared to be restored.  The wound was then irrigated, first with a dilute solution of Betadine, which was allowed to sit within the wound for 3 minutes and was thoroughly irrigated from the wound with a liter of normal saline.  We then sprinkled a gram of vancomycin powder into the wound.  The fascia was closed with some interrupted 0 Vicryl sutures, followed by a running #1 Vicryl suture.  The skin was closed with 2-0 Vicryl subcutaneous sutures and a 3-0 Stratafix running subcuticular suture.  A sterile dressing was applied to the wound.  The patient was then awakened from anesthesia and transferred to postanesthesia recovery in satisfactory condition.    It should be noted that my assistant was necessary throughout the entire procedure to assist with retraction and positioning.    Carter Diaz MD        D: 2022   T: 2022   MT: ANTWAN    Name:     ASHLEY VAZQUEZ  MRN:      [id number]        Account:        [id number]   :      1935           Procedure Date: 2022     Document: [id number]

## 2022-11-29 ENCOUNTER — APPOINTMENT (OUTPATIENT)
Dept: ULTRASOUND IMAGING | Facility: CLINIC | Age: 87
DRG: 470 | End: 2022-11-29
Attending: PHYSICIAN ASSISTANT
Payer: COMMERCIAL

## 2022-11-29 ENCOUNTER — APPOINTMENT (OUTPATIENT)
Dept: PHYSICAL THERAPY | Facility: CLINIC | Age: 87
DRG: 470 | End: 2022-11-29
Attending: ORTHOPAEDIC SURGERY
Payer: COMMERCIAL

## 2022-11-29 ENCOUNTER — APPOINTMENT (OUTPATIENT)
Dept: GENERAL RADIOLOGY | Facility: CLINIC | Age: 87
DRG: 470 | End: 2022-11-29
Attending: PHYSICIAN ASSISTANT
Payer: COMMERCIAL

## 2022-11-29 PROBLEM — Z96.641 STATUS POST TOTAL REPLACEMENT OF RIGHT HIP: Status: ACTIVE | Noted: 2022-11-29

## 2022-11-29 LAB
ALBUMIN UR-MCNC: NEGATIVE MG/DL
ANION GAP SERPL CALCULATED.3IONS-SCNC: 8 MMOL/L (ref 3–14)
APPEARANCE UR: CLEAR
BASOPHILS # BLD AUTO: 0 10E3/UL (ref 0–0.2)
BASOPHILS NFR BLD AUTO: 0 %
BILIRUB UR QL STRIP: NEGATIVE
BUN SERPL-MCNC: 23 MG/DL (ref 7–30)
CALCIUM SERPL-MCNC: 8.2 MG/DL (ref 8.5–10.1)
CHLORIDE BLD-SCNC: 98 MMOL/L (ref 94–109)
CO2 SERPL-SCNC: 26 MMOL/L (ref 20–32)
COLOR UR AUTO: ABNORMAL
CREAT SERPL-MCNC: 0.97 MG/DL (ref 0.52–1.04)
EOSINOPHIL # BLD AUTO: 0 10E3/UL (ref 0–0.7)
EOSINOPHIL NFR BLD AUTO: 0 %
ERYTHROCYTE [DISTWIDTH] IN BLOOD BY AUTOMATED COUNT: 14.3 % (ref 10–15)
FASTING STATUS PATIENT QL REPORTED: NO
GFR SERPL CREATININE-BSD FRML MDRD: 57 ML/MIN/1.73M2
GLUCOSE BLD-MCNC: 135 MG/DL (ref 70–99)
GLUCOSE BLD-MCNC: 135 MG/DL (ref 70–99)
GLUCOSE UR STRIP-MCNC: NEGATIVE MG/DL
HCT VFR BLD AUTO: 27 % (ref 35–47)
HGB BLD-MCNC: 8.6 G/DL (ref 11.7–15.7)
HGB UR QL STRIP: NEGATIVE
IMM GRANULOCYTES # BLD: 0.1 10E3/UL
IMM GRANULOCYTES NFR BLD: 1 %
KETONES UR STRIP-MCNC: NEGATIVE MG/DL
LEUKOCYTE ESTERASE UR QL STRIP: NEGATIVE
LYMPHOCYTES # BLD AUTO: 1.4 10E3/UL (ref 0.8–5.3)
LYMPHOCYTES NFR BLD AUTO: 9 %
MCH RBC QN AUTO: 29.5 PG (ref 26.5–33)
MCHC RBC AUTO-ENTMCNC: 31.9 G/DL (ref 31.5–36.5)
MCV RBC AUTO: 93 FL (ref 78–100)
MONOCYTES # BLD AUTO: 1.8 10E3/UL (ref 0–1.3)
MONOCYTES NFR BLD AUTO: 12 %
MUCOUS THREADS #/AREA URNS LPF: PRESENT /LPF
NEUTROPHILS # BLD AUTO: 11.3 10E3/UL (ref 1.6–8.3)
NEUTROPHILS NFR BLD AUTO: 78 %
NITRATE UR QL: NEGATIVE
NRBC # BLD AUTO: 0 10E3/UL
NRBC BLD AUTO-RTO: 0 /100
PH UR STRIP: 6 [PH] (ref 5–7)
PLATELET # BLD AUTO: 677 10E3/UL (ref 150–450)
POTASSIUM BLD-SCNC: 4.3 MMOL/L (ref 3.4–5.3)
PROCALCITONIN SERPL-MCNC: 0.22 NG/ML
RBC # BLD AUTO: 2.92 10E6/UL (ref 3.8–5.2)
RBC URINE: 1 /HPF
SODIUM SERPL-SCNC: 131 MMOL/L (ref 133–144)
SODIUM SERPL-SCNC: 132 MMOL/L (ref 133–144)
SP GR UR STRIP: 1.02 (ref 1–1.03)
SQUAMOUS EPITHELIAL: <1 /HPF
UROBILINOGEN UR STRIP-MCNC: NORMAL MG/DL
WBC # BLD AUTO: 14.5 10E3/UL (ref 4–11)
WBC URINE: 2 /HPF

## 2022-11-29 PROCEDURE — 99233 SBSQ HOSP IP/OBS HIGH 50: CPT | Mod: FS | Performed by: PHYSICIAN ASSISTANT

## 2022-11-29 PROCEDURE — 97530 THERAPEUTIC ACTIVITIES: CPT | Mod: GP | Performed by: PHYSICAL THERAPIST

## 2022-11-29 PROCEDURE — 36415 COLL VENOUS BLD VENIPUNCTURE: CPT | Performed by: PHYSICIAN ASSISTANT

## 2022-11-29 PROCEDURE — 93971 EXTREMITY STUDY: CPT | Mod: RT

## 2022-11-29 PROCEDURE — 80048 BASIC METABOLIC PNL TOTAL CA: CPT | Performed by: PHYSICIAN ASSISTANT

## 2022-11-29 PROCEDURE — 250N000013 HC RX MED GY IP 250 OP 250 PS 637: Performed by: ORTHOPAEDIC SURGERY

## 2022-11-29 PROCEDURE — 250N000013 HC RX MED GY IP 250 OP 250 PS 637: Performed by: PHYSICIAN ASSISTANT

## 2022-11-29 PROCEDURE — 71046 X-RAY EXAM CHEST 2 VIEWS: CPT

## 2022-11-29 PROCEDURE — 250N000011 HC RX IP 250 OP 636: Performed by: ORTHOPAEDIC SURGERY

## 2022-11-29 PROCEDURE — 250N000011 HC RX IP 250 OP 636: Performed by: PHYSICIAN ASSISTANT

## 2022-11-29 PROCEDURE — 258N000003 HC RX IP 258 OP 636: Performed by: PHYSICIAN ASSISTANT

## 2022-11-29 PROCEDURE — 85025 COMPLETE CBC W/AUTO DIFF WBC: CPT | Performed by: PHYSICIAN ASSISTANT

## 2022-11-29 PROCEDURE — 120N000001 HC R&B MED SURG/OB

## 2022-11-29 PROCEDURE — 84295 ASSAY OF SERUM SODIUM: CPT | Performed by: PHYSICIAN ASSISTANT

## 2022-11-29 PROCEDURE — 81001 URINALYSIS AUTO W/SCOPE: CPT | Performed by: PHYSICIAN ASSISTANT

## 2022-11-29 PROCEDURE — 97116 GAIT TRAINING THERAPY: CPT | Mod: GP | Performed by: PHYSICAL THERAPIST

## 2022-11-29 PROCEDURE — 84145 PROCALCITONIN (PCT): CPT | Performed by: PHYSICIAN ASSISTANT

## 2022-11-29 RX ORDER — AZITHROMYCIN 250 MG/1
250 TABLET, FILM COATED ORAL DAILY
Status: DISCONTINUED | OUTPATIENT
Start: 2022-11-30 | End: 2022-12-01 | Stop reason: HOSPADM

## 2022-11-29 RX ORDER — CEFTRIAXONE 1 G/1
1 INJECTION, POWDER, FOR SOLUTION INTRAMUSCULAR; INTRAVENOUS EVERY 24 HOURS
Status: DISCONTINUED | OUTPATIENT
Start: 2022-11-29 | End: 2022-12-01

## 2022-11-29 RX ORDER — AZITHROMYCIN 500 MG/1
500 INJECTION, POWDER, LYOPHILIZED, FOR SOLUTION INTRAVENOUS ONCE
Status: COMPLETED | OUTPATIENT
Start: 2022-11-29 | End: 2022-11-29

## 2022-11-29 RX ADMIN — HYDROXYZINE HYDROCHLORIDE 10 MG: 10 TABLET ORAL at 04:22

## 2022-11-29 RX ADMIN — ASPIRIN 325 MG: 325 TABLET, COATED ORAL at 09:14

## 2022-11-29 RX ADMIN — GABAPENTIN 100 MG: 100 CAPSULE ORAL at 22:01

## 2022-11-29 RX ADMIN — OXYCODONE HYDROCHLORIDE 10 MG: 5 TABLET ORAL at 04:22

## 2022-11-29 RX ADMIN — ACETAMINOPHEN 975 MG: 325 TABLET, FILM COATED ORAL at 09:14

## 2022-11-29 RX ADMIN — OXYCODONE HYDROCHLORIDE 5 MG: 5 TABLET ORAL at 14:18

## 2022-11-29 RX ADMIN — HYDROXYUREA 500 MG: 500 CAPSULE ORAL at 09:14

## 2022-11-29 RX ADMIN — CEFAZOLIN 1 G: 1 INJECTION, POWDER, FOR SOLUTION INTRAMUSCULAR; INTRAVENOUS at 02:42

## 2022-11-29 RX ADMIN — SODIUM CHLORIDE 500 ML: 9 INJECTION, SOLUTION INTRAVENOUS at 11:45

## 2022-11-29 RX ADMIN — OXYCODONE HYDROCHLORIDE 5 MG: 5 TABLET ORAL at 22:01

## 2022-11-29 RX ADMIN — SENNOSIDES AND DOCUSATE SODIUM 1 TABLET: 50; 8.6 TABLET ORAL at 09:14

## 2022-11-29 RX ADMIN — ATORVASTATIN CALCIUM 10 MG: 10 TABLET, FILM COATED ORAL at 09:14

## 2022-11-29 RX ADMIN — OXYCODONE HYDROCHLORIDE 5 MG: 5 TABLET ORAL at 00:33

## 2022-11-29 RX ADMIN — BENZOCAINE 6 MG-MENTHOL 10 MG LOZENGES 1 LOZENGE: at 12:40

## 2022-11-29 RX ADMIN — ACETAMINOPHEN 975 MG: 325 TABLET, FILM COATED ORAL at 00:32

## 2022-11-29 RX ADMIN — POLYETHYLENE GLYCOL 3350 17 G: 17 POWDER, FOR SOLUTION ORAL at 09:18

## 2022-11-29 RX ADMIN — CEFTRIAXONE SODIUM 1 G: 1 INJECTION, POWDER, FOR SOLUTION INTRAMUSCULAR; INTRAVENOUS at 16:05

## 2022-11-29 RX ADMIN — AZITHROMYCIN MONOHYDRATE 500 MG: 500 INJECTION, POWDER, LYOPHILIZED, FOR SOLUTION INTRAVENOUS at 17:30

## 2022-11-29 RX ADMIN — ACETAMINOPHEN 975 MG: 325 TABLET, FILM COATED ORAL at 16:12

## 2022-11-29 RX ADMIN — SENNOSIDES AND DOCUSATE SODIUM 1 TABLET: 50; 8.6 TABLET ORAL at 22:01

## 2022-11-29 ASSESSMENT — ACTIVITIES OF DAILY LIVING (ADL)
ADLS_ACUITY_SCORE: 28
ADLS_ACUITY_SCORE: 28
ADLS_ACUITY_SCORE: 27
ADLS_ACUITY_SCORE: 28
ADLS_ACUITY_SCORE: 28
DEPENDENT_IADLS:: CLEANING;SHOPPING;LAUNDRY;TRANSPORTATION
ADLS_ACUITY_SCORE: 28
ADLS_ACUITY_SCORE: 27
ADLS_ACUITY_SCORE: 28

## 2022-11-29 NOTE — PLAN OF CARE
A&Ox4, VSS on RA, speaks Chinese but can understand English well. Reg diet, likes taking pills crushed in apple sauce, Ax2 w/ GB&W, pain managed w/ oxy, atarax and tylenol. Voiding in bedpan, dressing CDI. ESBL precautions maintained.

## 2022-11-29 NOTE — PROVIDER NOTIFICATION
Mallory Landis PA-C to [facility] pt Na of 132    12:21- paged to [facility] CXray result- Left base infiltrate

## 2022-11-29 NOTE — CONSULTS
Care Management Initial Consult    General Information  Assessment completed with: Patient, Family, Patient/Daughters  Type of CM/SW Visit: Initial Assessment    Primary Care Provider verified and updated as needed:     Readmission within the last 30 days:        Reason for Consult: discharge planning  Advance Care Planning: Advance Care Planning Reviewed: no concerns identified          Communication Assessment  Patient's communication style: spoken language (English or Bilingual)    Hearing Difficulty or Deaf: no   Wear Glasses or Blind: no    Cognitive  Cognitive/Neuro/Behavioral: WDL                      Living Environment:   People in home: alone     Current living Arrangements: house      Able to return to prior arrangements: no       Family/Social Support:  Care provided by: self  Provides care for: no one  Marital Status:   Children          Description of Support System: Supportive, Involved    Support Assessment: Adequate family and caregiver support, Adequate social supports    Current Resources:   Patient receiving home care services: No     Community Resources: None  Equipment currently used at home: walker, rolling, shower chair  Supplies currently used at home: None    Employment/Financial:  Employment Status: retired        Financial Concerns: No concerns identified   Referral to Financial Worker: Yes       Lifestyle & Psychosocial Needs:  Social Determinants of Health     Tobacco Use: Low Risk      Smoking Tobacco Use: Never     Smokeless Tobacco Use: Never     Passive Exposure: Not on file   Alcohol Use: Not on file   Financial Resource Strain: Not on file   Food Insecurity: Not on file   Transportation Needs: Not on file   Physical Activity: Not on file   Stress: Not on file   Social Connections: Not on file   Intimate Partner Violence: Not on file   Depression: Not on file   Housing Stability: Not on file       Functional Status:  Prior to admission patient needed assistance:   Dependent  ADLs:: Independent  Dependent IADLs:: Cleaning, Shopping, Laundry, Transportation       Mental Health Status:  Mental Health Status: No Current Concerns       Chemical Dependency Status:  Chemical Dependency Status: No Current Concerns             Values/Beliefs:  Spiritual, Cultural Beliefs, Druze Practices, Values that affect care: no               Additional Information:  Per Care Management/Social Work Consult for Discharge planning. Patient admitted on 11/28/2022. SW reviewed chart and spoke with patient/daughters. Per patient/families report she resides in a large house alone and has recently moved to the lower level. Patient is independent with most ADL's/IADL's at baseline. Patient has walker and shower chair at home. Family prefers patient discharge to TCU and ortho is in agreement. Family would like referral sent to 1) [facility] 2) [facility] 3) [facility], 4) [facility]. Transport TBD pending patients mobility status. SW will continue to follow.    NAKUL Segovia    [facility]

## 2022-11-29 NOTE — PROGRESS NOTES
"POD# 1    SUBJECTIVE  Pain reasonably well managed  Requiring assist of two for mobility  Also complaining of leg tightness, SOB, abdominal pain, dysuria, and was noted to have leukocytosis on blood draw yesterday  All of these issues are being worked up by the Hospitalist service, and I appreciate their assistance    OBJECTIVE:   /52 (BP Location: Right arm)   Pulse 72   Temp 97.6  F (36.4  C) (Oral)   Resp 16   Ht 1.549 m (5' 1\")   Wt 46.7 kg (103 lb)   SpO2 94%   BMI 19.46 kg/m     Hemoglobin   Date Value Ref Range Status   11/28/2022 9.8 (L) 11.7 - 15.7 g/dL Final   05/14/2021 11.7 11.7 - 15.7 g/dL Final   ]   Wound: dressing dry  Labs pending    ASSESSMENT   Doing okay with her hip, although her mobility is quite poor  Multiple medical issues currently being evaluated     PLAN   Will hold off on discharge today  Will change her status to inpatient  It does not appear that discharge home would be safe or appropriate for this elderly patient, and will therefor have  see for discharge planning to a TCU    Carter Diaz MD   "

## 2022-11-29 NOTE — PROGRESS NOTES
[facility]  Hospitalist Progress Note    Assessment & Plan   Mejia Ordaz is a 87 year old female admitted on 11/28/2022.    Past medical history significant for OA, Osteoporosis, HTN, HLP, CKD stage 2, Thrombocythemia, Trigeminal neuralgia, Giant Cell arteritis, Myeloproliferative disease, History of ESBL infection who underwent an elective Right MARGI    Chart reviewed on POD #1 and CBC was checked POD #0 at ~6:40 that revealed a WBC of 18.3 with Abs Neutrophil of 16.2.  It also showed a HGB of 9.8, Hematocrit of 31.7, PLT of 668, RBC of 3.35 and MCH of 30.9.  Due to leukocytosis will plan to assess patient to ensure no active signs/symptoms of infection.      OA with degenerative changes of the right hip s/p right MARGI  POD #1.   - Orthopedic Surgery is managing.   --Defer analgesic management, DVT prophylaxis, PT/OT.    - HGB check in the morning.    - Encourage utilization of incentive spirometer.     Leukocytosis  POD #1 chart review revealed a Leukocytosis from CBC completed from POD #0.  Would anticipate some degree of leukocytosis due to reactive stress from surgery as well as from intra-op IV decadron but not this high.    *Patient was seen this morning and upon questioning indicated she has felt chilled, as well as short of breath, lower abdominal pain with dysuria and right lower extremity pain/tightness.   - CBC with diff this morning.    - Chest Xray (2 view) ordered.     - UA ordered.    - Right lower extremity venous US ordered.       Osteoporosis  Noted per pre-op H&P.  Not currently on any medications.      HTN  - Hold PTA lisinopril 10 mg/d this morning due to morning BP of 105/52.    - Check BMP this morning.      HLP  - Resumed on PTA Lipitor 10 mg/d.      CKD stage 2  - BMP in the morning.      Thrombocythemia, Essential  - Resumed on PTA hydroxyurea 500 mg/d.    - ASA therapy per Ortho.      Trigeminal neuralgia  - Resumed on PTA gabapentin 100 mg at bedtime.      Giant  Cell arteritis  No longer on prednisone and no interventions neccessary at this time.      Myeloproliferative disease  Continue to follow with outpatient Hematology/Oncology.  No interventions at this time.      History of ESBL infection  2018 urine infection.    - Contact isolation.      ADDENDUM 13:25   Lab studies completed this morning and WB improved to 14.5.  Patient with mild hyponatremia.  Chest Xray with left lower lobe infiltrate and right LE venous US negative for DVT.    - Add on Procalcitonin.     --With left lower lobe infiltrate will start with Azithromycin and Ceftriaxone but will await Procal results before starting.    - IV Fluid bolus and encourage PO fluids; plan to repeat sodium level.   - UA still needs to be collected.       ADDENDUM 16:25  UA negative.  Procal elevated at 0.22 and repeat sodium at 131.    *Patient with a history of hyponatremia 2/2 SIADH.    - Fluid restriction ordered.    - IV azithromycin 500 mg today and oral 250 mg to start tomorrow as well as IV ceftriaxone ordered.    - Recheck CBC with diff and BMP tomorrow morning.      Clinically Significant Risk Factors Present on Admission                  # Hypertension: home medication list includes antihypertensive(s)               Diet: Advance Diet as Tolerated: Regular Diet Adult  Discharge Instruction - Regular Diet Adult     DVT Prophylaxis: Defer to primary service   Garcia Catheter: Not present  Central Lines: None  Cardiac Monitoring: None  Code Status: Full Code      Disposition Plan    Morning discharge will be delayed while completing work-up for leukocytosis (rule out infection and DVT).        The patient's care was discussed with the Bedside Nurse, Patient and Charge Nurse and Ortho.      The patient has been discussed with Dr. Owens, who agrees with the assessment and plan at this time.    Minh Landis PA-C  [facility]  Securely message with the Vocera Web Console (learn more  "here)  Text page via [facility] Paging/Directory      Interval History   Patient was resting in bed upon arrival.  She complained of chills, shortness of breath with a dry cough, sore throat, lower abdominal pain with dysuria (has urinated twice since surgery) and right leg tightness and pain.  She feels very dry and requested water.      Patient stated she lives alone and is now concerned whether she can manage at home.      Spoke with nursing, charge nurse and touched base with Ortho.      -Data reviewed today: I reviewed all new labs and imaging results over the last 24 hours. I personally reviewed no images or EKG's today.    Physical Exam   /52 (BP Location: Right arm)   Pulse 72   Temp 97.6  F (36.4  C) (Oral)   Resp 16   Ht 1.549 m (5' 1\")   Wt 46.7 kg (103 lb)   SpO2 94%   BMI 19.46 kg/m        Constitutional: Awake, alert, cooperative, no apparent distress.  Though frequent dry cough.    ENT: Normocephalic, without obvious abnormality, atraumatic, oral pharynx with dry mucus membranes, tonsils without erythema or exudates.  Neck: Supple, symmetrical, trachea midline, no adenopathy.  Pulmonary: No increased work of breathing, fair air exchange, clear to auscultation bilaterally, no crackles or wheezing.  Frequent dry cough.    Cardiovascular: Regular rate and rhythm, normal S1 and S2, no S3 or S4, and no murmur noted.  GI: Normal bowel sounds, soft, non-distended.  Slight tenderness to palpation in the lower abdomen.    Skin/Integumen: Visualized skin appeared clear.  Neuro: CN II-XII grossly intact.  Psych:  Alert and oriented x 3. Normal affect.  Extremities: Trace right lower extremity edema noted with pain int eh right calf upon palpation.  Left lower extremity without edema and non-tender to palpation.        Medications     lactated ringers 125 mL/hr at 11/28/22 1614       acetaminophen  975 mg Oral Q8H     aspirin  325 mg Oral Daily     atorvastatin  10 mg Oral Daily     gabapentin  100 mg " Oral At Bedtime     hydroxyurea  500 mg Oral Daily     [Held by provider] lisinopril  10 mg Oral Daily     polyethylene glycol  17 g Oral Daily     senna-docusate  1 tablet Oral BID     sodium chloride (PF)  3 mL Intracatheter Q8H       Data   Recent Labs   Lab 11/28/22  1840 11/28/22  0753   WBC 18.3*  --    HGB 9.8*  --    MCV 95  --    *  --    NA  --  140   POTASSIUM  --  3.8  3.7   CHLORIDE  --  103   CO2  --  27   BUN  --  19   CR  --  0.78  0.77   ANIONGAP  --  10   LEIGH  --  8.9   GLC  --  99   ALBUMIN  --  4.0   PROTTOTAL  --  7.4   BILITOTAL  --  0.5   ALKPHOS  --  94   ALT  --  18   AST  --  22       Recent Results (from the past 24 hour(s))   XR Surgery LONI L/T 5 Min Fluoro w Stills    Narrative    XR SURGERY LONI FLUORO LESS THAN 5 MIN W STILLS 11/28/2022 12:10 PM     HISTORY: Right total hip arthroplasty. [id number]    VIEWS: 1    FLUOROSCOPY TIME: .5 minute(s)      Impression    IMPRESSION: Single intraoperative images of the right hip shows  postoperative changes of a right total hip arthroplasty.    ABBY DOVE MD         SYSTEM ID:  [id number]   XR Pelvis w Hip Port Right 1 View    Narrative    XR PELVIS AND HIP PORTABLE RIGHT 1 VIEW 11/28/2022 2:10 PM    HISTORY: Status post Hip surgery. Right hip pain.    COMPARISON: None.      Impression    IMPRESSION: Status post recent right total hip arthroplasty. No  fracture. Advanced degenerative changes in the left hip. Osteopenia.    ABBY DOVE MD         SYSTEM ID:  [id number]

## 2022-11-30 ENCOUNTER — APPOINTMENT (OUTPATIENT)
Dept: PHYSICAL THERAPY | Facility: CLINIC | Age: 87
DRG: 470 | End: 2022-11-30
Attending: ORTHOPAEDIC SURGERY
Payer: COMMERCIAL

## 2022-11-30 LAB
ANION GAP SERPL CALCULATED.3IONS-SCNC: 4 MMOL/L (ref 3–14)
BASOPHILS # BLD AUTO: 0 10E3/UL (ref 0–0.2)
BASOPHILS NFR BLD AUTO: 0 %
BUN SERPL-MCNC: 21 MG/DL (ref 7–30)
CALCIUM SERPL-MCNC: 7.9 MG/DL (ref 8.5–10.1)
CHLORIDE BLD-SCNC: 105 MMOL/L (ref 94–109)
CO2 SERPL-SCNC: 27 MMOL/L (ref 20–32)
CREAT SERPL-MCNC: 0.92 MG/DL (ref 0.52–1.04)
DEPRECATED CALCIDIOL+CALCIFEROL SERPL-MC: 20 UG/L (ref 20–75)
EOSINOPHIL # BLD AUTO: 0.2 10E3/UL (ref 0–0.7)
EOSINOPHIL NFR BLD AUTO: 2 %
ERYTHROCYTE [DISTWIDTH] IN BLOOD BY AUTOMATED COUNT: 14.2 % (ref 10–15)
FERRITIN SERPL-MCNC: 194 NG/ML (ref 8–252)
GFR SERPL CREATININE-BSD FRML MDRD: 60 ML/MIN/1.73M2
GLUCOSE BLD-MCNC: 113 MG/DL (ref 70–99)
GLUCOSE BLDC GLUCOMTR-MCNC: 111 MG/DL (ref 70–99)
HCT VFR BLD AUTO: 22.4 % (ref 35–47)
HGB BLD-MCNC: 7.3 G/DL (ref 11.7–15.7)
IMM GRANULOCYTES # BLD: 0.1 10E3/UL
IMM GRANULOCYTES NFR BLD: 1 %
IRON SATN MFR SERPL: 9 % (ref 15–46)
IRON SERPL-MCNC: 17 UG/DL (ref 35–180)
LYMPHOCYTES # BLD AUTO: 2 10E3/UL (ref 0.8–5.3)
LYMPHOCYTES NFR BLD AUTO: 21 %
MCH RBC QN AUTO: 29.6 PG (ref 26.5–33)
MCHC RBC AUTO-ENTMCNC: 32.6 G/DL (ref 31.5–36.5)
MCV RBC AUTO: 91 FL (ref 78–100)
MONOCYTES # BLD AUTO: 0.7 10E3/UL (ref 0–1.3)
MONOCYTES NFR BLD AUTO: 8 %
NEUTROPHILS # BLD AUTO: 6.7 10E3/UL (ref 1.6–8.3)
NEUTROPHILS NFR BLD AUTO: 68 %
NRBC # BLD AUTO: 0 10E3/UL
NRBC BLD AUTO-RTO: 0 /100
PLATELET # BLD AUTO: 541 10E3/UL (ref 150–450)
POTASSIUM BLD-SCNC: 3.8 MMOL/L (ref 3.4–5.3)
RBC # BLD AUTO: 2.47 10E6/UL (ref 3.8–5.2)
SODIUM SERPL-SCNC: 136 MMOL/L (ref 133–144)
TIBC SERPL-MCNC: 186 UG/DL (ref 240–430)
VIT B12 SERPL-MCNC: 524 PG/ML (ref 232–1245)
WBC # BLD AUTO: 9.7 10E3/UL (ref 4–11)

## 2022-11-30 PROCEDURE — 82607 VITAMIN B-12: CPT | Performed by: HOSPITALIST

## 2022-11-30 PROCEDURE — 250N000011 HC RX IP 250 OP 636: Performed by: PHYSICIAN ASSISTANT

## 2022-11-30 PROCEDURE — 83550 IRON BINDING TEST: CPT | Performed by: HOSPITALIST

## 2022-11-30 PROCEDURE — 36415 COLL VENOUS BLD VENIPUNCTURE: CPT | Performed by: PHYSICIAN ASSISTANT

## 2022-11-30 PROCEDURE — 99233 SBSQ HOSP IP/OBS HIGH 50: CPT | Performed by: HOSPITALIST

## 2022-11-30 PROCEDURE — 82310 ASSAY OF CALCIUM: CPT | Performed by: PHYSICIAN ASSISTANT

## 2022-11-30 PROCEDURE — 250N000013 HC RX MED GY IP 250 OP 250 PS 637: Performed by: ORTHOPAEDIC SURGERY

## 2022-11-30 PROCEDURE — 97110 THERAPEUTIC EXERCISES: CPT | Mod: GP | Performed by: PHYSICAL THERAPIST

## 2022-11-30 PROCEDURE — 97530 THERAPEUTIC ACTIVITIES: CPT | Mod: GP

## 2022-11-30 PROCEDURE — 97110 THERAPEUTIC EXERCISES: CPT | Mod: GP

## 2022-11-30 PROCEDURE — 250N000013 HC RX MED GY IP 250 OP 250 PS 637: Performed by: HOSPITALIST

## 2022-11-30 PROCEDURE — 250N000011 HC RX IP 250 OP 636: Performed by: HOSPITALIST

## 2022-11-30 PROCEDURE — 82728 ASSAY OF FERRITIN: CPT | Performed by: HOSPITALIST

## 2022-11-30 PROCEDURE — 82306 VITAMIN D 25 HYDROXY: CPT | Performed by: HOSPITALIST

## 2022-11-30 PROCEDURE — 97530 THERAPEUTIC ACTIVITIES: CPT | Mod: GP | Performed by: PHYSICAL THERAPIST

## 2022-11-30 PROCEDURE — 97116 GAIT TRAINING THERAPY: CPT | Mod: GP | Performed by: PHYSICAL THERAPIST

## 2022-11-30 PROCEDURE — 97116 GAIT TRAINING THERAPY: CPT | Mod: GP

## 2022-11-30 PROCEDURE — 258N000003 HC RX IP 258 OP 636: Performed by: HOSPITALIST

## 2022-11-30 PROCEDURE — 250N000013 HC RX MED GY IP 250 OP 250 PS 637: Performed by: PHYSICIAN ASSISTANT

## 2022-11-30 PROCEDURE — 85025 COMPLETE CBC W/AUTO DIFF WBC: CPT | Performed by: PHYSICIAN ASSISTANT

## 2022-11-30 PROCEDURE — 120N000001 HC R&B MED SURG/OB

## 2022-11-30 RX ORDER — OXYCODONE HYDROCHLORIDE 5 MG/1
5-10 TABLET ORAL EVERY 4 HOURS PRN
Qty: 25 TABLET | Refills: 0 | Status: SHIPPED | OUTPATIENT
Start: 2022-11-30 | End: 2023-06-07

## 2022-11-30 RX ORDER — CALCIUM CARBONATE/VITAMIN D3 600 MG-10
1 TABLET ORAL 2 TIMES DAILY WITH MEALS
Status: DISCONTINUED | OUTPATIENT
Start: 2022-11-30 | End: 2022-12-01 | Stop reason: HOSPADM

## 2022-11-30 RX ORDER — HYDROXYZINE HYDROCHLORIDE 10 MG/1
10 TABLET, FILM COATED ORAL EVERY 6 HOURS PRN
Qty: 30 TABLET | Refills: 0 | Status: SHIPPED | OUTPATIENT
Start: 2022-11-30 | End: 2023-06-07

## 2022-11-30 RX ADMIN — Medication 1 TABLET: at 08:59

## 2022-11-30 RX ADMIN — ACETAMINOPHEN 975 MG: 325 TABLET, FILM COATED ORAL at 08:58

## 2022-11-30 RX ADMIN — HYDROXYUREA 500 MG: 500 CAPSULE ORAL at 09:02

## 2022-11-30 RX ADMIN — ASPIRIN 325 MG: 325 TABLET, COATED ORAL at 08:58

## 2022-11-30 RX ADMIN — AZITHROMYCIN MONOHYDRATE 250 MG: 250 TABLET ORAL at 08:59

## 2022-11-30 RX ADMIN — SENNOSIDES AND DOCUSATE SODIUM 1 TABLET: 50; 8.6 TABLET ORAL at 21:28

## 2022-11-30 RX ADMIN — ATORVASTATIN CALCIUM 10 MG: 10 TABLET, FILM COATED ORAL at 08:59

## 2022-11-30 RX ADMIN — GABAPENTIN 100 MG: 100 CAPSULE ORAL at 21:28

## 2022-11-30 RX ADMIN — CEFTRIAXONE SODIUM 1 G: 1 INJECTION, POWDER, FOR SOLUTION INTRAMUSCULAR; INTRAVENOUS at 14:38

## 2022-11-30 RX ADMIN — HYDROXYZINE HYDROCHLORIDE 10 MG: 10 TABLET ORAL at 05:10

## 2022-11-30 RX ADMIN — Medication 1 TABLET: at 17:10

## 2022-11-30 RX ADMIN — IRON SUCROSE 300 MG: 20 INJECTION, SOLUTION INTRAVENOUS at 10:32

## 2022-11-30 RX ADMIN — SENNOSIDES AND DOCUSATE SODIUM 1 TABLET: 50; 8.6 TABLET ORAL at 08:59

## 2022-11-30 RX ADMIN — ACETAMINOPHEN 975 MG: 325 TABLET, FILM COATED ORAL at 17:10

## 2022-11-30 RX ADMIN — ACETAMINOPHEN 975 MG: 325 TABLET, FILM COATED ORAL at 00:53

## 2022-11-30 RX ADMIN — OXYCODONE HYDROCHLORIDE 5 MG: 5 TABLET ORAL at 21:28

## 2022-11-30 RX ADMIN — POLYETHYLENE GLYCOL 3350 17 G: 17 POWDER, FOR SOLUTION ORAL at 08:58

## 2022-11-30 RX ADMIN — OXYCODONE HYDROCHLORIDE 5 MG: 5 TABLET ORAL at 05:10

## 2022-11-30 ASSESSMENT — ACTIVITIES OF DAILY LIVING (ADL)
ADLS_ACUITY_SCORE: 33
ADLS_ACUITY_SCORE: 27
ADLS_ACUITY_SCORE: 31

## 2022-11-30 NOTE — PLAN OF CARE
Goal Outcome Evaluation:    A/Ox4. VSS on RA. Up A-1 w/ GB and walker. POD#1. CMS intact. R hip dressing CDI. Pain is managed by scheduled Tylenol and PRN Oxycodone. Voiding well per BR. C/o pain with swallowing, PRN Lozenges given with help. Had CXray with infiltrates and elevated WBC, started on IV ABT. Afebrile. No SOB. Daughters at bedside.

## 2022-11-30 NOTE — PLAN OF CARE
Goal Outcome Evaluation:    Patient vital signs are at baseline: Yes, on RA  Patient able to ambulate as they were prior to admission or with assist devices provided by therapies during their stay:  Yes, up to bathroom w/ Ax1 GBW   Patient MUST void prior to discharge:  Yes  Patient able to tolerate oral intake:  Yes  Pain has adequate pain control using Oral analgesics:  Yes, pain managed w/ oxycodone, tylenol & atarax    A&Ox4. CMS intact. Dx to L hip CDI. PIV SL. Pending discharge to TCU.

## 2022-11-30 NOTE — PROGRESS NOTES
[facility]  Hospitalist Progress Note    Assessment & Plan   Mejia Ordaz is a 87 year old female admitted on 11/28/2022.    Past medical history significant for OA, Osteoporosis, HTN, HLP, CKD stage 2, Thrombocythemia, Trigeminal neuralgia, Giant Cell arteritis, Myeloproliferative disease, History of ESBL infection who underwent an elective Right MRAGI    Chart reviewed on POD #1 and CBC was checked POD #0 at ~6:40 that revealed a WBC of 18.3 with Abs Neutrophil of 16.2.  It also showed a HGB of 9.8, Hematocrit of 31.7, PLT of 668, RBC of 3.35 and MCH of 30.9.  Due to leukocytosis will plan to assess patient to ensure no active signs/symptoms of infection.      OA with degenerative changes of the right hip s/p right MARGI  - Orthopedic Surgery is managing.   --Defer analgesic management, DVT prophylaxis, PT/OT.    -Follow hemoglobin daily, noted trending down.  - Encourage utilization of incentive spirometer.     Leukocytosis  Noted Leukocytosis POD #1 from CBC completed from POD #0.  Would anticipate some degree of leukocytosis due to reactive stress from surgery as well as from intra-op IV decadron but not this high.    *Follow-up questioning, patient indicated having chills, as well as short of breath and dry cough, lower abdominal pain with dysuria and right lower extremity pain/tightness.   -CXR showed LLL infiltrate  - UA unremarkable  - Right lower extremity venous US negative for DVT  -Started on Rocephin and azithromycin, leukocytosis has resolved.    Acute blood loss anemia  Noted hemoglobin was 11-12 earlier this year, and has gradually dropped down to 7.3.  -Suspect dilution and surgical blood loss.  Surgical site without bruise or bruising  -Follow hemoglobin daily  -Transfuse for Hb less than 7  -Check iron panel, ferritin.  Will consider IV iron if deficiency/borderline low.    Osteoporosis  Hypocalcemia  Noted per pre-op H&P.  Not currently on any medications.    -Check vitamin  D  - start Os-Lester D    HTN  -PTA lisinopril on hold and blood pressure remains normal.    HLP  - Resumed on PTA Lipitor 10 mg/d.      CKD stage 2  -Creatinine is stable.    Thrombocythemia, Essential  - Resumed on PTA hydroxyurea 500 mg/d.    - ASA therapy per Ortho.      Trigeminal neuralgia  Facial palsy  - Resumed on PTA gabapentin 100 mg at bedtime.  -Patient with Bell's palsy for more than 30 years    Giant Cell arteritis  No longer on prednisone and no interventions neccessary at this time.      Myeloproliferative disease  Continue to follow with outpatient Hematology/Oncology.  No interventions at this time.      History of ESBL infection  2018 urine infection.    - Contact isolation.     History of SIADH  Runs low sodium, down to 131   -Initiated on water restriction and normal now    Clinically Significant Risk Factors Present on Admission         # Hyponatremia: Lowest Na = 131 mmol/L (Ref range: 136-145) in last 2 days, will monitor as appropriate          # Hypertension: home medication list includes antihypertensive(s)             Diet: Advance Diet as Tolerated: Regular Diet Adult  Discharge Instruction - Regular Diet Adult  Fluid restriction 1500 ML FLUID     DVT Prophylaxis: Defer to primary service   Garcia Catheter: Not present  Central Lines: None  Cardiac Monitoring: None  Code Status: Full Code        Disposition Plan         The patient's care was discussed with the Bedside Nurse, Patient and Charge Nurse and Ortho.  Discussed with patient's daughter Shameka over the phone.    Jason Owens MD  [facility]  Securely message with the Vocera Web Console (learn more here)  Text page via [employer] Paging/Directory      Interval History     Patient was evaluated this morning, feels slight short of breath, with some dry cough but denies chest pain.  Remains afebrile.  Remains on room air.  -Leukocytosis has resolved.  Noted anemia has slightly worsened.  -Postop right hip pain,  "reports slightly worse with activity.    Noted left facial palsy, patient says for more than 15 years, per daughter may be even 30 years ago.       -Data reviewed today: I reviewed all new labs and imaging results over the last 24 hours. I personally reviewed no images or EKG's today.    Physical Exam   /57 (BP Location: Right arm, Patient Position: Supine)   Pulse 72   Temp 98.5  F (36.9  C) (Oral)   Resp 16   Ht 1.549 m (5' 1\")   Wt 46.7 kg (103 lb)   SpO2 94%   BMI 19.46 kg/m        Constitutional: Awake, alert, cooperative, no apparent distress.     HEENT: PERRLA, EOMI.  Has facial palsy on left, per report has Bell's palsy.  Pulmonary: Bilateral equal air entry, CTA.  On room air.  Cardiovascular: S1-S2 regular, no murmur or tachycardia.  GI: Normal bowel sounds, soft, non-distended.  Slight tenderness to palpation in the lower abdomen.    Skin/Integumen: Visualized skin appeared clear.  Neuro: Has left facial palsy.  Psych:  Alert and oriented x 3. Normal affect.   Extremities: Trace right lower extremity edema noted.  Left lower extremity without edema and non-tender to palpation.        Medications     lactated ringers 125 mL/hr at 11/28/22 1614       acetaminophen  975 mg Oral Q8H     aspirin  325 mg Oral Daily     atorvastatin  10 mg Oral Daily     azithromycin  250 mg Oral Daily     calcium carbonate-vitamin D  1 tablet Oral BID w/meals     cefTRIAXone  1 g Intravenous Q24H     gabapentin  100 mg Oral At Bedtime     hydroxyurea  500 mg Oral Daily     [Held by provider] lisinopril  10 mg Oral Daily     polyethylene glycol  17 g Oral Daily     senna-docusate  1 tablet Oral BID     sodium chloride (PF)  3 mL Intracatheter Q8H       Data   Recent Labs   Lab 11/30/22  0722 11/30/22  0621 11/29/22  1453 11/29/22  0924 11/28/22  1840 11/28/22  0753   WBC 9.7  --   --  14.5* 18.3*  --    HGB 7.3*  --   --  8.6* 9.8*  --    MCV 91  --   --  93 95  --    *  --   --  677* 668*  --      --  " 131* 132*  --  140   POTASSIUM 3.8  --   --  4.3  --  3.8  3.7   CHLORIDE 105  --   --  98  --  103   CO2  --   --   --  26  --  27   BUN  --   --   --  23  --  19   CR  --   --   --  0.97  --  0.78  0.77   ANIONGAP  --   --   --  8  --  10   LEIGH  --   --   --  8.2*  --  8.9   GLC  --  111*  --  135*  135*  --  99   ALBUMIN  --   --   --   --   --  4.0   PROTTOTAL  --   --   --   --   --  7.4   BILITOTAL  --   --   --   --   --  0.5   ALKPHOS  --   --   --   --   --  94   ALT  --   --   --   --   --  18   AST  --   --   --   --   --  22       Recent Results (from the past 24 hour(s))   XR Chest 2 Views    Narrative    CHEST TWO VIEWS 11/29/2022 10:09 AM     HISTORY: Post-op leukocytosis with complaints of chills, shortness of  breath and dry cough.    COMPARISON: June 5, 2022       Impression    IMPRESSION: Left base infiltrate. Right lung clear. The cardiac  silhouette is not enlarged. Pulmonary vasculature is unremarkable.    VICKIE MARKS MD         SYSTEM ID:  [id number]   US Lower Extremity Venous Duplex Right    Narrative    VENOUS ULTRASOUND RIGHT LEG  11/29/2022 11:31 AM     HISTORY: Post-op leukocytosis with right calf pain and slight edema    COMPARISON: None.    FINDINGS:  Examination of the deep veins with graded compression and  color flow Doppler with spectral wave form analysis was performed.  No  evidence for DVT in the right lower extremity.      Impression    IMPRESSION: No evidence of deep venous thrombosis.    MANUEL THRASHER MD         SYSTEM ID:  [id number]

## 2022-11-30 NOTE — PLAN OF CARE
Occupational Therapy: Orders received. Chart reviewed and discussed with care team.? Occupational Therapy not indicated due to limited tolerance for therapy and plan for discharge to TCU, will defer OT eval to next level of care.? Defer discharge recommendations to medical team.? Will complete orders.

## 2022-11-30 NOTE — CONSULTS
"CLINICAL NUTRITION SERVICES  -  ASSESSMENT NOTE    Recommendations Ordered by Registered Dietitian (RD):   - Trial vanilla magic cup  - Encourage ongoing adequate PO   Malnutrition:   % Weight Loss:  Weight loss does not meet criteria for malnutrition   % Intake:  Decreased intake does not meet criteria for malnutrition   Subcutaneous Fat Loss:  Orbital region mild depletion and Upper arm region mild depletion  Muscle Loss:  Temporal & clavicle region mild depletion  Fluid Retention:  None noted    Malnutrition Diagnosis: Patient does not meet two of the above criteria necessary for diagnosing malnutrition     REASON FOR ASSESSMENT  Mejia Ordaz is a 87 year old female seen by Registered Dietitian for Nutrition Admission Risk Screen Received -   Have you recently lost weight without trying in the last 6 months ? - \"yes 14-23 pounds\"  Have you been eating poorly due to a decreased appetite ?- \"yes\"    NUTRITION HISTORY  - Information obtained from chart and patient report.   - Patient reports eating fairly well at home, her daughter encourages her to eat consistently and she does drink protein supplements like Ensure.   - She prefers  dishes, but finding foods she will eat on the hospital menu.   - Gradual wt loss reported, no significant changes.     CURRENT NUTRITION ORDERS  Diet Order:     Regular + fluid restriction to 1500 mL    Current Intake/Tolerance:  Pt is ordering adequate meals here. Intakes are typically 75% of meals, only ate 50% of her breakfast today. Ordered a large lunch (pot roast, mashed potatoes, and chili) and was eating it during visit.   She is interested in trying a supplement.     NUTRITION FOCUSED PHYSICAL ASSESSMENT FOR DIAGNOSING MALNUTRITION)  Yes           Observed:    Muscle wasting (refer to documentation in Malnutrition section) and Subcutaneous fat loss (refer to documentation in Malnutrition section)    Obtained from Chart/Interdisciplinary Team:  11/28 - Right direct anterior " "total hip arthroplasty    ANTHROPOMETRICS  Height: 5' 1\"  Weight: 103 lbs 0 oz (46.7 kg)   Body mass index is 19.46 kg/m .  Weight Status:  Normal BMI  IBW: 47.7 kg   % IBW: 98%  Weight History: Pt reported a 15# weight loss gradually over a long time frame.   Wt Readings from Last 10 Encounters:   11/28/22 46.7 kg (103 lb)   06/04/22 46.8 kg (103 lb 3.2 oz)   05/14/21 51.3 kg (113 lb)   09/07/18 52 kg (114 lb 9.6 oz)   09/02/18 53.2 kg (117 lb 3.2 oz)   08/09/17 52.6 kg (115 lb 14.4 oz)   11/11/16 54 kg (119 lb)   10/15/15 55.8 kg (123 lb 1.6 oz)   09/03/15 56.2 kg (124 lb)   08/20/15 58.5 kg (128 lb 15.5 oz)       LABS  Labs reviewed    MEDICATIONS  Medications reviewed  Caltrate  Venofer  Miralax & Senokot scheduled  LR @ 125 ml/hr     ASSESSED NUTRITION NEEDS PER APPROVED PRACTICE GUIDELINES:  Dosing Weight 47 kg   Estimated Energy Needs: 8380-7594 kcals (25-30 Kcal/Kg)  Justification: maintenance  Estimated Protein Needs: 56-70 grams protein (1-1.2 g pro/Kg)  Justification: maintenance / post op  Estimated Fluid Needs: 1 mL/kcal   Justification: maintenance    MALNUTRITION:  % Weight Loss:  Weight loss does not meet criteria for malnutrition   % Intake:  Decreased intake does not meet criteria for malnutrition   Subcutaneous Fat Loss:  Orbital region mild depletion and Upper arm region mild depletion  Muscle Loss:  Temporal & clavicle region mild depletion  Fluid Retention:  None noted    Malnutrition Diagnosis: Patient does not meet two of the above criteria necessary for diagnosing malnutrition    NUTRITION DIAGNOSIS:  Predicted inadequate nutrient intake (energy) related to reducing appetite resulting in slowly losing weight over the past few years.      NUTRITION INTERVENTIONS  Recommendations / Nutrition Prescription  Regular diet  Magic cup w/ dinner     Implementation  Nutrition education: Provided education on supplements.   Medical Food Supplement: as ordered    Nutrition Goals  Intake of >/=75% meals " BID-TID.     MONITORING AND EVALUATION:  Progress towards goals will be monitored and evaluated per protocol and Practice Guidelines    Lisa Shah RD, LD  [facility], [facility], [facility]  Pager: [phone]  Weekend Pager: [phone]

## 2022-11-30 NOTE — PROGRESS NOTES
"POD# 2    SUBJECTIVE  Pain well controlled  Infiltrate seen on CXR, on IV abx per Hospitalists  U/S leg neg for DDVT    OBJECTIVE:   /57 (BP Location: Right arm, Patient Position: Supine)   Pulse 72   Temp 98.5  F (36.9  C) (Oral)   Resp 16   Ht 1.549 m (5' 1\")   Wt 46.7 kg (103 lb)   SpO2 94%   BMI 19.46 kg/m     Hemoglobin   Date Value Ref Range Status   11/30/2022 7.3 (L) 11.7 - 15.7 g/dL Final   05/14/2021 11.7 11.7 - 15.7 g/dL Final   ]   Wound: dressing dry  Swelling within expected range      ASSESSMENT   Acute postoperative blood loss anemia  Currently hemodynamically stable    PLAN   Recheck Hgb tomorrow  Mobilize in PT  discharge planning to TCU  Possible discharge tomorrow if medically stable    Carter Diaz MD   "

## 2022-12-01 ENCOUNTER — APPOINTMENT (OUTPATIENT)
Dept: PHYSICAL THERAPY | Facility: CLINIC | Age: 87
DRG: 470 | End: 2022-12-01
Attending: ORTHOPAEDIC SURGERY
Payer: COMMERCIAL

## 2022-12-01 ENCOUNTER — APPOINTMENT (OUTPATIENT)
Dept: SPEECH THERAPY | Facility: CLINIC | Age: 87
DRG: 470 | End: 2022-12-01
Attending: HOSPITALIST
Payer: COMMERCIAL

## 2022-12-01 VITALS
SYSTOLIC BLOOD PRESSURE: 134 MMHG | WEIGHT: 103 LBS | TEMPERATURE: 97.7 F | DIASTOLIC BLOOD PRESSURE: 89 MMHG | HEART RATE: 83 BPM | BODY MASS INDEX: 19.45 KG/M2 | HEIGHT: 61 IN | RESPIRATION RATE: 16 BRPM | OXYGEN SATURATION: 97 %

## 2022-12-01 LAB
FOLATE SERPL-MCNC: 14.6 NG/ML (ref 4.6–34.8)
HGB BLD-MCNC: 7.7 G/DL (ref 11.7–15.7)

## 2022-12-01 PROCEDURE — 92610 EVALUATE SWALLOWING FUNCTION: CPT | Mod: GN | Performed by: SPEECH-LANGUAGE PATHOLOGIST

## 2022-12-01 PROCEDURE — 250N000011 HC RX IP 250 OP 636: Performed by: HOSPITALIST

## 2022-12-01 PROCEDURE — 99232 SBSQ HOSP IP/OBS MODERATE 35: CPT | Performed by: HOSPITALIST

## 2022-12-01 PROCEDURE — 258N000003 HC RX IP 258 OP 636: Performed by: HOSPITALIST

## 2022-12-01 PROCEDURE — 97530 THERAPEUTIC ACTIVITIES: CPT | Mod: GP | Performed by: PHYSICAL THERAPY ASSISTANT

## 2022-12-01 PROCEDURE — 250N000013 HC RX MED GY IP 250 OP 250 PS 637: Performed by: PHYSICIAN ASSISTANT

## 2022-12-01 PROCEDURE — 250N000013 HC RX MED GY IP 250 OP 250 PS 637: Performed by: HOSPITALIST

## 2022-12-01 PROCEDURE — 250N000013 HC RX MED GY IP 250 OP 250 PS 637: Performed by: ORTHOPAEDIC SURGERY

## 2022-12-01 PROCEDURE — 97116 GAIT TRAINING THERAPY: CPT | Mod: GP | Performed by: PHYSICAL THERAPY ASSISTANT

## 2022-12-01 PROCEDURE — 36415 COLL VENOUS BLD VENIPUNCTURE: CPT | Performed by: ORTHOPAEDIC SURGERY

## 2022-12-01 PROCEDURE — 82746 ASSAY OF FOLIC ACID SERUM: CPT | Performed by: HOSPITALIST

## 2022-12-01 PROCEDURE — 97110 THERAPEUTIC EXERCISES: CPT | Mod: GP | Performed by: PHYSICAL THERAPY ASSISTANT

## 2022-12-01 PROCEDURE — 85018 HEMOGLOBIN: CPT | Performed by: ORTHOPAEDIC SURGERY

## 2022-12-01 RX ORDER — CHOLECALCIFEROL (VITAMIN D3) 1250 MCG
1250 CAPSULE ORAL
Qty: 6 CAPSULE | DISCHARGE
Start: 2022-12-08 | End: 2022-12-01

## 2022-12-01 RX ORDER — CEFDINIR 300 MG/1
300 CAPSULE ORAL 2 TIMES DAILY
Qty: 4 CAPSULE | Refills: 0 | Status: SHIPPED | OUTPATIENT
Start: 2022-12-02 | End: 2023-06-07

## 2022-12-01 RX ORDER — CHOLECALCIFEROL (VITAMIN D3) 1250 MCG
1250 CAPSULE ORAL
Qty: 6 CAPSULE | Refills: 0 | Status: SHIPPED | OUTPATIENT
Start: 2022-12-08 | End: 2023-06-07

## 2022-12-01 RX ORDER — AZITHROMYCIN 250 MG/1
250 TABLET, FILM COATED ORAL DAILY
Qty: 1 TABLET | DISCHARGE
Start: 2022-12-02 | End: 2022-12-01

## 2022-12-01 RX ORDER — CEFTRIAXONE 1 G/1
1 INJECTION, POWDER, FOR SOLUTION INTRAMUSCULAR; INTRAVENOUS EVERY 24 HOURS
Status: DISCONTINUED | OUTPATIENT
Start: 2022-12-01 | End: 2022-12-01 | Stop reason: HOSPADM

## 2022-12-01 RX ORDER — AZITHROMYCIN 250 MG/1
250 TABLET, FILM COATED ORAL DAILY
Qty: 1 TABLET | Refills: 0 | Status: SHIPPED | OUTPATIENT
Start: 2022-12-02 | End: 2023-06-07

## 2022-12-01 RX ORDER — CEFDINIR 300 MG/1
300 CAPSULE ORAL 2 TIMES DAILY
Qty: 4 CAPSULE | DISCHARGE
Start: 2022-12-02 | End: 2022-12-01

## 2022-12-01 RX ORDER — CALCIUM CARBONATE/VITAMIN D3 600 MG-10
1 TABLET ORAL 2 TIMES DAILY WITH MEALS
DISCHARGE
Start: 2022-12-01 | End: 2023-06-07

## 2022-12-01 RX ORDER — CHOLECALCIFEROL (VITAMIN D3) 1250 MCG
1250 CAPSULE ORAL
Status: DISCONTINUED | OUTPATIENT
Start: 2022-12-01 | End: 2022-12-01 | Stop reason: HOSPADM

## 2022-12-01 RX ADMIN — ASPIRIN 325 MG: 325 TABLET, COATED ORAL at 09:16

## 2022-12-01 RX ADMIN — ACETAMINOPHEN 975 MG: 325 TABLET, FILM COATED ORAL at 01:07

## 2022-12-01 RX ADMIN — CHOLECALCIFEROL CAP 1.25 MG (50000 UNIT) 1250 MCG: 1.25 CAP at 11:16

## 2022-12-01 RX ADMIN — ATORVASTATIN CALCIUM 10 MG: 10 TABLET, FILM COATED ORAL at 09:16

## 2022-12-01 RX ADMIN — AZITHROMYCIN MONOHYDRATE 250 MG: 250 TABLET ORAL at 09:16

## 2022-12-01 RX ADMIN — Medication 1 TABLET: at 17:26

## 2022-12-01 RX ADMIN — SENNOSIDES AND DOCUSATE SODIUM 1 TABLET: 50; 8.6 TABLET ORAL at 09:16

## 2022-12-01 RX ADMIN — ACETAMINOPHEN 975 MG: 325 TABLET, FILM COATED ORAL at 09:14

## 2022-12-01 RX ADMIN — CEFTRIAXONE SODIUM 1 G: 1 INJECTION, POWDER, FOR SOLUTION INTRAMUSCULAR; INTRAVENOUS at 11:15

## 2022-12-01 RX ADMIN — POLYETHYLENE GLYCOL 3350 17 G: 17 POWDER, FOR SOLUTION ORAL at 09:14

## 2022-12-01 RX ADMIN — Medication 1 TABLET: at 09:16

## 2022-12-01 RX ADMIN — IRON SUCROSE 300 MG: 20 INJECTION, SOLUTION INTRAVENOUS at 09:29

## 2022-12-01 RX ADMIN — HYDROXYUREA 500 MG: 500 CAPSULE ORAL at 09:15

## 2022-12-01 ASSESSMENT — ACTIVITIES OF DAILY LIVING (ADL)
ADLS_ACUITY_SCORE: 33
ADLS_ACUITY_SCORE: 35
ADLS_ACUITY_SCORE: 35
ADLS_ACUITY_SCORE: 33
ADLS_ACUITY_SCORE: 35
ADLS_ACUITY_SCORE: 35

## 2022-12-01 NOTE — PROGRESS NOTES
Pt A/Ox4, VSS on RA, not oob on this shift, denies numbness/tingling, CMS intact, hip aquacel CDI, TCU discharge pending.

## 2022-12-01 NOTE — PROGRESS NOTES
[facility]  Hospitalist Progress Note    Assessment & Plan   Mejia Ordaz is a 87 year old female admitted on 11/28/2022.    Past medical history significant for OA, Osteoporosis, HTN, HLP, CKD stage 2, Thrombocythemia, Trigeminal neuralgia, Giant Cell arteritis, Myeloproliferative disease, History of ESBL infection who underwent an elective Right MARGI    Chart reviewed on POD #1 and CBC was checked POD #0 at ~6:40 that revealed a WBC of 18.3 with Abs Neutrophil of 16.2.  It also showed a HGB of 9.8, Hematocrit of 31.7, PLT of 668, RBC of 3.35 and MCH of 30.9.  Due to leukocytosis will plan to assess patient to ensure no active signs/symptoms of infection.      OA with degenerative changes of the right hip s/p right MARGI  - Orthopedic Surgery is managing routine postop cares.   -Patient has been evaluated by therapy and as per family's request, discharging home with home cares.    LLL community acquired pneumonia  Noted Leukocytosis POD #1 from CBC completed from POD #0.  Would anticipate some degree of leukocytosis due to reactive stress from surgery as well as from intra-op IV decadron but not this high.    *Follow-up questioning, patient indicated having chills, as well as short of breath and dry cough, lower abdominal pain with dysuria and right lower extremity pain/tightness.   -CXR showed LLL infiltrate. UA unremarkable  - Right lower extremity venous US negative for DVT  - Started on Rocephin and azithromycin, leukocytosis has resolved.  -P.o. cefdinir/azithromycin to complete the course at discharge.    Acute blood loss anemia  Noted hemoglobin was 11-12 earlier this year, and has gradually dropped down to 7.3.  -Suspect dilution and surgical blood loss.  Surgical site without bruise or bruising  -Hemoglobin stable at 7.  -Labs consistent with iron deficiency, IV iron x2 infused.   -Recheck hemoglobin during follow-up with PCP    Osteoporosis  Hypocalcemia  Vitamin D deficiency  Noted  per pre-op H&P.  Not currently on any medications.    -Vitamin D level low.  Commended 50,000 units weekly for 12 weeks.  Prescription provided for 6 weeks, follow-up with PCP  - started Os-Lester D as well    HTN  -PTA lisinopril was on hold, resuming now.    HLP  - Resumed on PTA Lipitor 10 mg/d.      CKD stage 2  -Creatinine is stable.    Thrombocythemia, Essential  - Resumed on PTA hydroxyurea 500 mg/d.    - ASA therapy per Ortho.      Trigeminal neuralgia  Facial palsy  - Resumed on PTA gabapentin 100 mg at bedtime.  -Patient with Bell's palsy for more than 30 years    Giant Cell arteritis  No longer on prednisone and no interventions neccessary at this time.      Myeloproliferative disease  Continue to follow with outpatient Hematology/Oncology.  No interventions at this time.      History of ESBL infection  2018 urine infection.    - Contact isolation.     History of SIADH  Runs low sodium, down to 131   -Initiated on water restriction and normal now    Clinically Significant Risk Factors         # Hyponatremia: Lowest Na = 131 mmol/L (Ref range: 136-145) in last 2 days, will monitor as appropriate                        Diet: Advance Diet as Tolerated: Regular Diet Adult  Discharge Instruction - Regular Diet Adult  Fluid restriction 1500 ML FLUID  Snacks/Supplements Adult: Magic Cup; With Meals     DVT Prophylaxis: Defer to primary service   Garcia Catheter: Not present  Central Lines: None  Cardiac Monitoring: None  Code Status: Full Code        Disposition Plan         The patient's care was discussed with the Bedside Nurse, Patient and Charge Nurse and Ortho.   Okay for discharge.  Med rec completed.    Jason Owens MD  [facility]  Securely message with the Vocera Web Console (learn more here)  Text page via [employer] Paging/Directory      Interval History     Patient was evaluated this morning, denies shortness of breath.  Patient reports cough after drinking liquid.  No chest  "pain.  Afebrile and remains on room air.    -Postop pain well controlled.    Noted left facial palsy, patient says for more than 15 years, per daughter may be even 30 years ago.       -Data reviewed today: I reviewed all new labs  results over the last 24 hours. I personally reviewed no images or EKG's today.    Physical Exam   /71 (BP Location: Right arm)   Pulse 77   Temp 97.8  F (36.6  C) (Oral)   Resp 16   Ht 1.549 m (5' 1\")   Wt 46.7 kg (103 lb)   SpO2 93%   BMI 19.46 kg/m        Constitutional: Awake, alert, cooperative, no apparent distress.     HEENT: PERRLA, EOMI.  Has facial palsy on left, per report has Bell's palsy.  Pulmonary: Bilateral equal air entry, CTA.  On room air.  Cardiovascular: S1-S2 regular, no murmur or tachycardia.  GI: Normal bowel sounds, soft, non-distended.  Slight tenderness to palpation in the lower abdomen.    Skin/Integumen: Visualized skin appeared clear.  Neuro: Has left facial palsy.  Psych:  Alert and oriented x 3. Normal affect.   Extremities: Trace right lower extremity edema noted.  Left lower extremity without edema and non-tender to palpation.        Medications     lactated ringers 125 mL/hr at 11/28/22 1614       aspirin  325 mg Oral Daily     atorvastatin  10 mg Oral Daily     azithromycin  250 mg Oral Daily     calcium carbonate-vitamin D  1 tablet Oral BID w/meals     cefTRIAXone  1 g Intravenous Q24H     cholecalciferol  1,250 mcg Oral Q7 Days     gabapentin  100 mg Oral At Bedtime     hydroxyurea  500 mg Oral Daily     iron sucrose  300 mg Intravenous Daily     lisinopril  10 mg Oral Daily     polyethylene glycol  17 g Oral Daily     senna-docusate  1 tablet Oral BID     sodium chloride (PF)  3 mL Intracatheter Q8H       Data   Recent Labs   Lab 12/01/22  0803 11/30/22  0722 11/30/22  0621 11/29/22  1453 11/29/22  0924 11/28/22  1840 11/28/22  0753 11/28/22  0753   WBC  --  9.7  --   --  14.5* 18.3*  --   --    HGB 7.7* 7.3*  --   --  8.6* 9.8*   < >  " --    MCV  --  91  --   --  93 95  --   --    PLT  --  541*  --   --  677* 668*  --   --    NA  --  136  --  131* 132*  --   --  140   POTASSIUM  --  3.8  --   --  4.3  --   --  3.8  3.7   CHLORIDE  --  105  --   --  98  --   --  103   CO2  --  27  --   --  26  --   --  27   BUN  --  21  --   --  23  --   --  19   CR  --  0.92  --   --  0.97  --   --  0.78  0.77   ANIONGAP  --  4  --   --  8  --   --  10   LEIGH  --  7.9*  --   --  8.2*  --   --  8.9   GLC  --  113* 111*  --  135*  135*  --   --  99   ALBUMIN  --   --   --   --   --   --   --  4.0   PROTTOTAL  --   --   --   --   --   --   --  7.4   BILITOTAL  --   --   --   --   --   --   --  0.5   ALKPHOS  --   --   --   --   --   --   --  94   ALT  --   --   --   --   --   --   --  18   AST  --   --   --   --   --   --   --  22    < > = values in this interval not displayed.       No results found for this or any previous visit (from the past 24 hour(s)).

## 2022-12-01 NOTE — PROGRESS NOTES
12/01/22 0879   Appointment Info   Signing Clinician's Name / Credentials (SLP) Vibha Talley MS CCC SLP   General Information   Onset of Illness/Injury or Date of Surgery 11/28/22   Referring Physician Dr. Owens   Patient/Family Therapy Goal Statement (SLP) Patient did not state.   Pertinent History of Current Problem Past medical history significant for OA, Osteoporosis, HTN, HLP, CKD stage 2, Thrombocythemia, Trigeminal neuralgia, Giant Cell arteritis, Myeloproliferative disease, History of ESBL infection who underwent an elective Right MARGI   General Observations Patient with a remote history of Bell's Palsy with left facial weakness.   Type of Evaluation   Type of Evaluation Swallow Evaluation   Oral Motor   Oral Musculature anomalies present   Structural Abnormalities none present   Mucosal Quality good   Dentition (Oral Motor)   Dentition (Oral Motor) natural dentition;some missing teeth   Facial Symmetry (Oral Motor)   Facial Symmetry (Oral Motor) left side impairment   Left Side Facial Asymmetry   (Mild left sided weakness from Bell's Palsy.)   Lip Function (Oral Motor)   Lip Range of Motion (Oral Motor) retraction impairment;protrusion impairment   Lip Strength (Oral Motor) left side;mild impairment   Protrusion, Lip Range of Motion left side;minimal impairment   Retraction, Lip Range of Motion left side;moderate impairment   Tongue Function (Oral Motor)   Tongue Strength (Oral Motor) WFL   Tongue Coordination/Speed (Oral Motor) slows down progressively   Tongue ROM (Oral Motor) elevation is impaired   Elevation, Tongue ROM Impairment (Oral Motor) left side;minimal impairment   Jaw Function (Oral Motor)   Jaw Function (Oral Motor) WNL   Cough/Swallow/Gag Reflex (Oral Motor)   Volitional Throat Clear/Cough (Oral Motor) impaired;reduced strength   Volitional Swallow (Oral Motor) mildly delayed   Vocal Quality/Secretion Management (Oral Motor)   Vocal Quality (Oral Motor) hoarse   General Swallowing  Observations   Past History of Dysphagia No documented history found.   Respiratory Support (General Swallowing Observations) none   Current Diet/Method of Nutritional Intake (General Swallowing Observations, NIS) regular diet;thin liquids (level 0)   Swallowing Evaluation Clinical swallow evaluation   Clinical Swallow Evaluation   Feeding Assistance no assistance needed   Clinical Swallow Evaluation Textures Trialed thin liquids;pureed;solid foods   Clinical Swallow Eval: Thin Liquid Texture Trial   Mode of Presentation, Thin Liquids cup;straw;self-fed   Volume of Liquid or Food Presented 6 oz of water   Oral Phase of Swallow premature pharyngeal entry   Pharyngeal Phase of Swallow impaired;reduction in laryngeal movement;throat clearing   Diagnostic Statement Premature entry with increased throat clearing via the straw. Improved tolerance via the cup with single sips.   Clinical Swallow Evaluation: Puree Solid Texture Trial   Mode of Presentation, Puree spoon;self-fed   Volume of Puree Presented 4 teaspoons of pudding   Oral Phase, Puree WFL   Pharyngeal Phase, Puree intact   Clinical Swallow Evaluation: Solid Food Texture Trial   Mode of Presentation self-fed   Volume Presented 1/2 of a banana   Oral Phase WFL   Pharyngeal Phase impaired;reduction in laryngeal movement   Diagnostic Statement Mastication was functional for soft solid with good oral clearance and no oral residue. No Sx of aspiration.   Swallowing Recommendations   Diet Consistency Recommendations easy to chew (level 7);thin liquids (level 0)   Supervision Level for Intake distant supervision needed   Mode of Delivery Recommendations bolus size, small;food moistened;no straws;slow rate of intake   Postural Recommendations none   Swallowing Maneuver Recommendations alternate food and liquid intake   Monitoring/Assistance Required (Eating/Swallowing) stop eating activities when fatigue is present;monitor for cough or change in vocal quality with  intake   Medication Administration Recommendations, Swallowing (SLP) Whole in apple sauce   General Therapy Interventions   Planned Therapy Interventions Dysphagia Treatment   Dysphagia treatment Modified diet education;Instruction of safe swallow strategies   Clinical Impression   Criteria for Skilled Therapeutic Interventions Met (SLP Eval) Evaluation only   SLP Diagnosis Mild oral and pharyngeal dysphagia   Risks & Benefits of therapy have been explained evaluation/treatment results reviewed;care plan/treatment goals reviewed;risks/benefits reviewed;current/potential barriers reviewed;participants voiced agreement with care plan;participants included;patient   Clinical Impression Comments Patient presents with mild oral and pharyngeal dysphagia at bedside. Patient with a remote history of Bell's Palsy with left facial weakness. She demonstrated premature entry of thin liquids and mild delay noted with increased throat clearing via the straw especially with consecutive swallows. Improvement via the cup with single small sips. Mastication was sufficient for soft solids with good oral clearance and no overt Sx of aspiration.     Recommend: 1. IDDSI level 7 regular easy to chew foods and thin liquids. 2. Upright, no straws, small bites/sips and alternate liquids/solids. 3.Patient discharging to home with home health and a follow up session with SLP to ensure diet tolerance and strategies.     SLP Total Evaluation Time   Eval: oral/pharyngeal swallow function, clinical swallow Minutes (34577) 20   SLP Discharge Planning   SLP Plan Discharging to home   SLP Discharge Recommendation home with home care speech therapy   SLP Rationale for DC Rec Patient is near baseline swallow function.   SLP Brief overview of current status  IDDSI & regular easy to chew fooods and follow up with home health SLP to ensure diet tolerance and swallow strategies.   Total Session Time   Total Session Time (sum of timed and untimed services)  20

## 2022-12-01 NOTE — PLAN OF CARE
Goal Outcome Evaluation:  A/Ox4. VSS on RA. Up A-1 w/ GB and walker. POD#2. CMS intact. R hip dressing CDI. Pain is managed by scheduled Tylenol. Voiding well per BR. Sore throat is much better today. Fair appetite. FR 1500. Ambulated at the hallway with NA this evening, tolerated well. Hgb is 7.3 today, started iron sucrose IV.

## 2022-12-01 NOTE — PROGRESS NOTES
Care Management Discharge Note    Discharge Date: 12/01/2022       Discharge Disposition: Transitional Care    Discharge Services: None    Discharge DME: None    Discharge Transportation: agency, family or friend will provide    Private pay costs discussed: Not applicable    PAS Confirmation Code:    Patient/family educated on Medicare website which has current facility and service quality ratings: yes    Education Provided on the Discharge Plan:    Persons Notified of Discharge Plans: bedside RN  Patient/Family in Agreement with the Plan: yes    Handoff Referral Completed: No    Additional Information:  Per SW, pt is declining TCU at discharge and family has agreed for home with HC as pt's sister is coming into town and will be able to stay with pt 24/7 with daughters helping daily with visits.  Per SW, home care choice was offered to pt's daughter Shameka, and she was ok with any home care agency covered by pt's insurance.  Referral sent to [facility] and per Gertrudis N[facility] Liaison for [facility], they are able to accept for RN, PT & HHA services.  Called pt's daughter Shameka and informed her that [facility] is able to accept for RN & PT & HHA services and that [facility] will call her with pt's scheduled home care visits date & time.  Per Shameka, she will be able to transport pt home today.  NIYA Villegas RN, BSN, OCN   Inpatient Care Coordination [address]  Office: [phone]

## 2022-12-01 NOTE — PROGRESS NOTES
Care Management Follow Up    Length of Stay (days): 2    Expected Discharge Date: 12/01/2022     Concerns to be Addressed:       Patient plan of care discussed at interdisciplinary rounds: Yes    Anticipated Discharge Disposition: Transitional Care     Anticipated Discharge Services: None  Anticipated Discharge DME: None    Patient/family educated on Medicare website which has current facility and service quality ratings: yes  Education Provided on the Discharge Plan:    Patient/Family in Agreement with the Plan: yes    Referrals Placed by CM/SW:    Private pay costs discussed: Not applicable    Additional Information:  NITESH received acceptance form [facility] to take patient today. NITESH spoke with daughter gume who indicated patient has decided she would like to go home and patients sister is flying in from [place] to stay with her 24/7 and her children will be there on and off to provide support. NITESH informed [facility] and care coordinator of need for home care. Care management will continue to follow for discharge plans.       NAKUL Segovia    [facility]

## 2022-12-01 NOTE — PLAN OF CARE
Goal Outcome Evaluation:    Patient vital signs are at baseline: Yes, on RA  Patient able to ambulate as they were prior to admission or with assist devices provided by therapies during their stay:  Yes, up to bathroom w/ Ax1 GBW   Patient MUST void prior to discharge:  Yes  Patient able to tolerate oral intake:  Yes  Pain has adequate pain control using Oral analgesics:  Yes, pain managed w/ oxycodone     A&Ox4. CMS intact. Dx to L hip CDI. PIV SL. Pending discharge to TCU.

## 2022-12-01 NOTE — PROGRESS NOTES
Care Management Follow Up    Length of Stay (days): 2    Expected Discharge Date: 12/01/2022     Concerns to be Addressed:       Patient plan of care discussed at interdisciplinary rounds: Yes    Anticipated Discharge Disposition: Transitional Care     Anticipated Discharge Services: None  Anticipated Discharge DME: None    Patient/family educated on Medicare website which has current facility and service quality ratings: yes  Education Provided on the Discharge Plan:    Patient/Family in Agreement with the Plan: yes    Referrals Placed by CM/SW:    Private pay costs discussed: Not applicable    Additional Information:  NITESH informed patient will need home speech therapy as well. NITESH emailed [facility] liason and was infomred they can add speech therapy on as well and would need speech therapy orders added. SW informed bedside nurse and requested orders be updated.      NAKUL Segovia    [facility]

## 2022-12-01 NOTE — DISCHARGE INSTRUCTIONS
You will be discharge home with [facility] with Skilled RN, PT & home health aide services.  [facility] will contact you directly to arrange the first visit.      [facility]'s phone number is [phone].

## 2022-12-01 NOTE — PROGRESS NOTES
"POD# 3    SUBJECTIVE  Pain well controlled  Ambulating with assist of one  Per nursing, the family has been discussing discharge home as a possibility now    OBJECTIVE:   /71 (BP Location: Right arm)   Pulse 77   Temp 97.8  F (36.6  C) (Oral)   Resp 16   Ht 1.549 m (5' 1\")   Wt 46.7 kg (103 lb)   SpO2 93%   BMI 19.46 kg/m     Hemoglobin   Date Value Ref Range Status   12/01/2022 7.7 (L) 11.7 - 15.7 g/dL Final   05/14/2021 11.7 11.7 - 15.7 g/dL Final   ]   Wound: dressing dry      ASSESSMENT   Overall, doing well with her hip  Acute blood loss anemia is improving and she remains HD stable    PLAN   Okay to discharge today if okay with Hospitalist  Discharge home vs TCU depending on patient's progress in PT     Carter Diaz MD     Addendum:  Arrangements in place for discharge to [facility] today.  She would benefit from a short stay in TCU, so we will plan on discharge to [facility] today.  "

## 2022-12-02 NOTE — PLAN OF CARE
Pt. is AxO x4. Assist x1 with GB/walker. Scant, loose BM this afternoon. CMS intact. Dressing is CDI. Voiding. Pain is managed by PRN oxycodone. Good appetite. Seen by speech therapy this afternoon, diet changed to easy to chew level 7. Discharged to home with home care. AVS given, teach back done, and all items checked and sent.

## 2022-12-02 NOTE — PLAN OF CARE
Physical Therapy Discharge Summary    Reason for therapy discharge:    Discharged to home with home therapy.    Progress towards therapy goal(s). See goals on Care Plan in [facility] electronic health record for goal details.  Goals not met.  Barriers to achieving goals:   discharge from facility.    Therapy recommendation(s):    Continued therapy is recommended.  Rationale/Recommendations:  Recommended TCU, pt declined. Pt will benefit from Home PT to progress independence and safety with functional mobility.

## 2022-12-02 NOTE — DISCHARGE SUMMARY
[facility] Discharge Summary    Mejia Ordaz MRN# [id number]   Age: 87 year old YOB: 1935     Date of Admission:  11/28/2022  Date of Discharge::  12/1/2022  6:25 PM  Admitting Physician:  Carter Diaz MD  Discharge Physician:  Carter Diaz MD     Home clinic: Carlos Méndez MD, [facility]          Admission Diagnoses:   Osteoarthritis right hip          Discharge Diagnosis:   Arthoplasty of the right hip  Acute blood loss anemia  Pulmonary infiltrate          Procedures:   Procedure(s): Total hip arthoplasty (Right)       No procedures performed during this admission              Discharge Medications:     Discharge Medication List as of 12/1/2022  5:39 PM      START taking these medications    Details   calcium carbonate-vitamin D (CALTRATE) 600-10 MG-MCG per tablet Take 1 tablet by mouth 2 times daily (with meals), Transitional      senna-docusate (SENOKOT-S/PERICOLACE) 8.6-50 MG tablet Take 1-2 tablets by mouth 2 times daily Take while on oral narcotics to prevent or treat constipation., Disp-30 tablet, R-0, E-PrescribeWhile taking narcotics         CONTINUE these medications which have CHANGED    Details   acetaminophen (TYLENOL) 325 MG tablet Take 2 tablets (650 mg) by mouth every 4 hours as needed for other (mild pain), Disp-100 tablet, R-0, E-Prescribe      !! aspirin (ASA) 325 MG EC tablet Take 1 tablet (325 mg) by mouth daily, Disp-30 tablet, R-0, E-Prescribe      !! aspirin 81 MG EC tablet Take 1 tablet (81 mg) by mouth daily Restart once daily (81 mg) after completing course of 325 mg daily prescribed by Ortho., Historical      azithromycin (ZITHROMAX) 250 MG tablet Take 1 tablet (250 mg) by mouth daily, Disp-1 tablet, R-0, E-Prescribe      cefdinir (OMNICEF) 300 MG capsule Take 1 capsule (300 mg) by mouth 2 times daily, Disp-4 capsule, R-0, E-Prescribe      cholecalciferol (VITAMIN D3) 1250 mcg (13424 units) capsule Take 1 capsule (1,250 mcg) by mouth  every 7 days, Disp-6 capsule, R-0, E-PrescribeFuture refills by PCP Dr. Carlos Méndez with phone number None.      hydrOXYzine (ATARAX) 10 MG tablet Take 1 tablet (10 mg) by mouth every 6 hours as needed for itching or anxiety (with pain, moderate pain), Disp-30 tablet, R-0, Local Print      oxyCODONE (ROXICODONE) 5 MG tablet Take 1-2 tablets (5-10 mg) by mouth every 4 hours as needed for moderate to severe pain, Disp-25 tablet, R-0, Local Print       !! - Potential duplicate medications found. Please discuss with provider.      CONTINUE these medications which have NOT CHANGED    Details   atorvastatin (LIPITOR) 10 MG tablet Take 1 tablet (10 mg) by mouth daily, Disp-90 tablet, R-0, E-Prescribe      celecoxib (CELEBREX) 200 MG capsule Take 200 mg by mouth daily, Historical      cyclobenzaprine (FLEXERIL) 5 MG tablet Take 0.5 tablets (2.5 mg) by mouth 3 times daily as needed for muscle spasms, Disp-15 tablet, R-0, E-Prescribe      gabapentin (NEURONTIN) 100 MG capsule Take 1 capsule (100 mg) by mouth At Bedtime, Disp-15 capsule, R-0, E-Prescribe      hydroxyurea (HYDREA) 500 MG capsule Take by mouth daily, Historical      Lidocaine (LIDOCARE) 4 % Patch Place 1 patch onto the skin every 24 hours , to right hip. To prevent lidocaine toxicity, patient should be patch free for 12 hrs daily.Disp-10 patch, [id number]      lisinopril (ZESTRIL) 10 MG tablet Take 10 mg by mouth daily, Historical      melatonin 3 MG tablet Take 1 mg by mouth nightly as needed for sleep, Historical                   Consultations:   Consultation during this admission received from internal medicine          Brief History of Illness:   This patient was a 87 year old female with a known history of right hip osteoarthritis.  She underwent a period of non-operative treatment which only provided mild and intermittent relief.  After a lengthy discussion and consultation with Dr. Carter Diaz, the patient chose to undergo a right side  hip arthroplasty.  She was explained the risks,complications, and benefits of the procedure and elected to have the surgical procedure.  Patient was cleared by her primary care physician for joint replacement.           Hospital Course:   The patient tolerated the procedure well and was taken to postop recovery in stable condition.  Please refer to the full operative note for complete details.   In recovery, her post-operative films show components in excellent position.     On post-operative day 1, patient's wound was checked and noted to be healing well.  Her white blood count was slightly elevated and she complained of a mild cough, so a chest x-ray was performed, which showed an infiltrate.  It was unclear if this was clinically significant, but she was placed on IV antibiotics and ultimately discharged home on oral antibiotics for this.  Her white blood count normalized within a day of being on antibiotics.   Patient had motor strength of 5/5 on the right side.  Patient was neurovascularly intact in the right side lower extremity. There were no complications throughout the hospital course as the patient passed physical therapy/occupational therapy on post-operative day #3.    Her discharge hemoglobin was 7.6 and the patient did not require a blood transfusion.  She was followed by the hospitalist during this hospital visit to manage her medical problems.     Upon discharge, the patient was seen by Dr. Diaz and all questions were answered.  She was discharged on home medications as outlined in medication reconciliation list outlined below.  The patient has instructions that if she has increased pain, fever, erythema, swelling or drainage to immediately call.          Discharge Instructions and Follow-Up:   Discharge diet: Regular   Discharge activity: Activity as tolerated   Discharge follow-up: Follow up with Dr. Diaz in 10-14 days   Wound care: Keep Aquacel dressing in place.  Okay to shower.            Discharge Disposition:   Discharged to home with home health care      Attestation:  I have reviewed today's vital signs, notes, medications, labs and imaging.    Carter Diaz MD

## 2022-12-06 ENCOUNTER — DOCUMENTATION ONLY (OUTPATIENT)
Dept: OTHER | Facility: CLINIC | Age: 87
End: 2022-12-06

## 2023-01-05 ENCOUNTER — HOSPITAL ENCOUNTER (EMERGENCY)
Facility: CLINIC | Age: 88
Discharge: HOME | End: 2023-01-05
Attending: EMERGENCY MEDICINE | Admitting: EMERGENCY MEDICINE
Payer: COMMERCIAL

## 2023-01-05 ENCOUNTER — APPOINTMENT (OUTPATIENT)
Dept: MRI IMAGING | Facility: CLINIC | Age: 88
End: 2023-01-05
Attending: EMERGENCY MEDICINE
Payer: COMMERCIAL

## 2023-01-05 VITALS
HEIGHT: 62 IN | TEMPERATURE: 97 F | OXYGEN SATURATION: 99 % | DIASTOLIC BLOOD PRESSURE: 50 MMHG | BODY MASS INDEX: 18.4 KG/M2 | WEIGHT: 100 LBS | SYSTOLIC BLOOD PRESSURE: 137 MMHG | RESPIRATION RATE: 18 BRPM | HEART RATE: 79 BPM

## 2023-01-05 DIAGNOSIS — R42 VERTIGO: ICD-10-CM

## 2023-01-05 DIAGNOSIS — R42 DIZZINESS: ICD-10-CM

## 2023-01-05 LAB
ALBUMIN SERPL-MCNC: 3.9 G/DL (ref 3.4–5)
ALP SERPL-CCNC: 91 U/L (ref 40–150)
ALT SERPL W P-5'-P-CCNC: 26 U/L (ref 0–50)
ANION GAP SERPL CALCULATED.3IONS-SCNC: 7 MMOL/L (ref 3–14)
AST SERPL W P-5'-P-CCNC: 21 U/L (ref 0–45)
ATRIAL RATE - MUSE: 66 BPM
BASOPHILS # BLD AUTO: 0 10E3/UL (ref 0–0.2)
BASOPHILS NFR BLD AUTO: 0 %
BILIRUB DIRECT SERPL-MCNC: <0.1 MG/DL (ref 0–0.2)
BILIRUB SERPL-MCNC: 0.3 MG/DL (ref 0.2–1.3)
BUN SERPL-MCNC: 24 MG/DL (ref 7–30)
CALCIUM SERPL-MCNC: 9.1 MG/DL (ref 8.5–10.1)
CHLORIDE BLD-SCNC: 101 MMOL/L (ref 94–109)
CO2 SERPL-SCNC: 30 MMOL/L (ref 20–32)
CREAT SERPL-MCNC: 0.78 MG/DL (ref 0.52–1.04)
DIASTOLIC BLOOD PRESSURE - MUSE: NORMAL MMHG
EOSINOPHIL # BLD AUTO: 0.3 10E3/UL (ref 0–0.7)
EOSINOPHIL NFR BLD AUTO: 3 %
ERYTHROCYTE [DISTWIDTH] IN BLOOD BY AUTOMATED COUNT: 16.4 % (ref 10–15)
GFR SERPL CREATININE-BSD FRML MDRD: 74 ML/MIN/1.73M2
GLUCOSE BLD-MCNC: 94 MG/DL (ref 70–99)
HCT VFR BLD AUTO: 37.9 % (ref 35–47)
HGB BLD-MCNC: 11.8 G/DL (ref 11.7–15.7)
HOLD SPECIMEN: NORMAL
IMM GRANULOCYTES # BLD: 0 10E3/UL
IMM GRANULOCYTES NFR BLD: 0 %
INTERPRETATION ECG - MUSE: NORMAL
LYMPHOCYTES # BLD AUTO: 2.1 10E3/UL (ref 0.8–5.3)
LYMPHOCYTES NFR BLD AUTO: 22 %
MCH RBC QN AUTO: 29.3 PG (ref 26.5–33)
MCHC RBC AUTO-ENTMCNC: 31.1 G/DL (ref 31.5–36.5)
MCV RBC AUTO: 94 FL (ref 78–100)
MONOCYTES # BLD AUTO: 0.8 10E3/UL (ref 0–1.3)
MONOCYTES NFR BLD AUTO: 8 %
NEUTROPHILS # BLD AUTO: 6.2 10E3/UL (ref 1.6–8.3)
NEUTROPHILS NFR BLD AUTO: 67 %
NRBC # BLD AUTO: 0 10E3/UL
NRBC BLD AUTO-RTO: 0 /100
P AXIS - MUSE: 55 DEGREES
PLATELET # BLD AUTO: 803 10E3/UL (ref 150–450)
POTASSIUM BLD-SCNC: 4 MMOL/L (ref 3.4–5.3)
PR INTERVAL - MUSE: 210 MS
PROT SERPL-MCNC: 7.6 G/DL (ref 6.8–8.8)
QRS DURATION - MUSE: 72 MS
QT - MUSE: 424 MS
QTC - MUSE: 444 MS
R AXIS - MUSE: 34 DEGREES
RBC # BLD AUTO: 4.03 10E6/UL (ref 3.8–5.2)
SODIUM SERPL-SCNC: 138 MMOL/L (ref 133–144)
SYSTOLIC BLOOD PRESSURE - MUSE: NORMAL MMHG
T AXIS - MUSE: 65 DEGREES
TROPONIN I SERPL HS-MCNC: 6 NG/L
VENTRICULAR RATE- MUSE: 66 BPM
WBC # BLD AUTO: 9.4 10E3/UL (ref 4–11)

## 2023-01-05 PROCEDURE — 70553 MRI BRAIN STEM W/O & W/DYE: CPT

## 2023-01-05 PROCEDURE — 70549 MR ANGIOGRAPH NECK W/O&W/DYE: CPT

## 2023-01-05 PROCEDURE — 255N000002 HC RX 255 OP 636: Performed by: EMERGENCY MEDICINE

## 2023-01-05 PROCEDURE — 99285 EMERGENCY DEPT VISIT HI MDM: CPT | Mod: 25

## 2023-01-05 PROCEDURE — 82248 BILIRUBIN DIRECT: CPT | Performed by: EMERGENCY MEDICINE

## 2023-01-05 PROCEDURE — 85025 COMPLETE CBC W/AUTO DIFF WBC: CPT | Performed by: EMERGENCY MEDICINE

## 2023-01-05 PROCEDURE — 70544 MR ANGIOGRAPHY HEAD W/O DYE: CPT

## 2023-01-05 PROCEDURE — 80053 COMPREHEN METABOLIC PANEL: CPT | Performed by: EMERGENCY MEDICINE

## 2023-01-05 PROCEDURE — A9585 GADOBUTROL INJECTION: HCPCS | Performed by: EMERGENCY MEDICINE

## 2023-01-05 PROCEDURE — 84484 ASSAY OF TROPONIN QUANT: CPT | Performed by: EMERGENCY MEDICINE

## 2023-01-05 PROCEDURE — 93005 ELECTROCARDIOGRAM TRACING: CPT | Mod: RTG

## 2023-01-05 PROCEDURE — 85004 AUTOMATED DIFF WBC COUNT: CPT | Performed by: EMERGENCY MEDICINE

## 2023-01-05 PROCEDURE — 36415 COLL VENOUS BLD VENIPUNCTURE: CPT | Performed by: EMERGENCY MEDICINE

## 2023-01-05 RX ORDER — GADOBUTROL 604.72 MG/ML
10 INJECTION INTRAVENOUS ONCE
Status: COMPLETED | OUTPATIENT
Start: 2023-01-05 | End: 2023-01-05

## 2023-01-05 RX ADMIN — GADOBUTROL 10 ML: 604.72 INJECTION INTRAVENOUS at 19:23

## 2023-01-05 ASSESSMENT — ENCOUNTER SYMPTOMS
DIZZINESS: 1
PALPITATIONS: 0
VOMITING: 0
DIFFICULTY URINATING: 1
HEADACHES: 1
DYSURIA: 0
NAUSEA: 0
FEVER: 0

## 2023-01-05 NOTE — ED NOTES
Rapid Assessment Note    History:   Mejia Ordaz is a 87 year old female who presents with dizziness. Her family members report she had hip surgery on 11/28/22. She reportedly has been taking her medications then on 12/29/22 she started feeling dizzy and family is concerned the patient will fall. Patient states this dizziness is different than her past vertigo. She feels like the room is spinning. For example, she did not vomit with this dizziness, but does with her past vertigo. Family states she also hasn't been sleeping well. The patient states she feels better in the day time. She endorses dizziness while seated in the ER. She endorses a dull, achy headache at the back of her head that started a week ago. She states it's hard to go to the bathroom. Family member reports a history of bladder infections. She reports no new numbness in her arms or legs. Vision has not changed. Patient uses a walker at home. No chest pain, fever, or palpitations, or nausea. No dysuria.     Exam:   General:  Alert, interactive  Cardiovascular:  Well perfused  Lungs:  No respiratory distress, no accessory muscle use  Neuro:  Moving all 4 extremities  Skin:  Warm, dry  Psych:  Normal affect  ***    Plan of Care:   I evaluated the patient and developed an initial plan of care. I discussed this plan and explained that I, or one of my partners, would be returning to complete the evaluation.     I, ALEENA GALICIA, am serving as a scribe to document services personally performed by Dr. Vicente, based on my observations and the provider's statements to me.    1/5/2023  [employer]    Portions of this medical record were completed by a scribe. UPON MY REVIEW AND AUTHENTICATION BY ELECTRONIC SIGNATURE, this confirms (a) I performed the applicable clinical services, and (b) the record is accurate.

## 2023-01-05 NOTE — ED TRIAGE NOTES
Dizziness for one week. Has had episodes of vertigo in the past. Glucose 202. Has been to vertigo clinic in past. Has taken meclizine but did not have relief. Describes as room spinning. Primary MD referred to ED. Baseline L facial droop, no focal weaknesses.

## 2023-01-06 NOTE — ED PROVIDER NOTES
History     Chief Complaint:  Dizziness     HPI   Mejia Ordaz is an 87 year old female who presents with dizziness. Her family members report she had hip surgery on 11/28/22. She reportedly has been taking her medications as prescribed.  Then on 12/29/22 she started feeling dizzy and family is concerned the patient will fall. Patient states this dizziness is different than her past vertigo. She feels like the room is spinning. For example, she did not have nausea and vomiting with this dizziness, but did with her past vertigo. Family states she also hasn't been sleeping well. The patient states she feels better in the day time. She endorses dizziness while seated in the ER. She endorses a dull, achy headache at the back of her head that started a week ago. She states it is hard to go to the bathroom due to her dizziness. Family member reports a history of bladder infections. She reports no new numbness in her arms or legs or any weakness.  No vision changes. Patient uses a walker at home. No chest pain, fever, or palpitations, abdominal pain, or nausea. No dysuria or diarrhea.     Independent Historian: Patient      Review of External Notes: ED provider note from 5- from Dr. Tinoco.    ROS:  Review of Systems   Constitutional: Negative for fever.   Cardiovascular: Negative for chest pain and palpitations.   Gastrointestinal: Negative for nausea and vomiting.   Genitourinary: Positive for difficulty urinating. Negative for dysuria.   Neurological: Positive for dizziness and headaches.   All other systems reviewed and are negative.    Allergies:  Hydrochlorothiazide  Penicillins  Scopolamine     Medications:    ASA  Lipitor  Zithromax  Omnicef  Flexeril  Neurontin  Atarax  Lidocare  Roxicodone  Pericolace  Celebrex  Hydrea  Zestril    Past Medical History:    Osteoporosis  Hypertension  Chronic kidney disease  Hyperlipidemia  Microabuminuria  PONV  Preauricular cyst  Vertigo  Bell's palsy  Trigeminal  "neuralgia  Atrophic vaginitis  Chronic UTI  Arteritis  Thrombocytopenia  Arthritis  Depression  Anemia  Myeloproliferative disease  Hypercholesteremia  Hyponatremia  GERD    Past Surgical History:    Total hip arthroplasty (Right anterior)  Balloon compression rhizotomy  Colonoscopy  Excise lesion Head  Gastritis esophagoscopy  Hysterectomy     Family History:    Father: Cerebrovascular disease    Social History:  Patient reports that she has never smoked. She has never used smokeless tobacco. She reports that she does not drink alcohol and does not use drugs.  PCP: Carlos Méndez     Physical Exam     Patient Vitals for the past 24 hrs:   BP Temp Temp src Pulse Resp SpO2 Height Weight   01/05/23 1506 137/50 97  F (36.1  C) Temporal 79 18 99 % 1.575 m (5' 2\") 45.4 kg (100 lb)        Physical Exam  Nursing note and vitals reviewed.  Constitutional:  Oriented to person, place, and time. Cooperative.   HENT:   Nose:    Nose normal.   Mouth/Throat:   Mucous membranes are normal.   Eyes:    Conjunctivae normal and EOM are normal.      Pupils are equal, round, and reactive to light.   Neck:    Trachea normal.   Cardiovascular:  Normal rate, regular rhythm, normal heart sounds and normal pulses. No murmur heard.  Pulmonary/Chest:  Effort normal and breath sounds normal.   Abdominal:   Soft. Normal appearance and bowel sounds are normal.      There is no tenderness.      There is no rebound and no CVA tenderness.   Musculoskeletal:  Extremities atraumatic x 4.   Lymphadenopathy:  No cervical adenopathy.   Neurological:   Alert and oriented to person, place, and time. Normal strength.  No nystagmus present currently.     No cranial nerve deficit or sensory deficit. GCS eye subscore is 4. GCS verbal subscore is 5. GCS motor subscore is 6.   Skin:    Skin is intact. No rash noted.   Psychiatric:   Normal mood and affect.      Emergency Department Course     ECG results from 01/05/23   EKG 12-lead, tracing only     Value "    Systolic Blood Pressure     Diastolic Blood Pressure     Ventricular Rate 66    Atrial Rate 66    PA Interval 210    QRS Duration 72        QTc 444    P Axis 55    R AXIS 34    T Axis 65    Interpretation ECG      Sinus rhythm with 1st degree A-V block  Otherwise normal ECG  When compared with ECG of 14-MAY-2021 10:07,  No significant change was found  Confirmed by GENERATED REPORT, COMPUTER ([id number]),  Amber Nelson ([id number]) on 1/5/2023 5:39:29 PM         Imaging:  MRA Angiogram Neck w/o & w Contrast   Final Result   IMPRESSION:   1.  Normal head MRI.      MRA HEAD AND NECK WITHOUT AND WITH CONTRAST      Clinical History: ; dizzy;       Technique: 3-D Time-of-flight MRA of the head without contrast. Unenhanced 2-D Time-of-flight and gadolinium-enhanced 3-D Time-of-flight images of the neck arteries.  3D reconstructions were obtained.  Estimates of carotid stenosis are made relative to    the distal internal carotid artery diameters except as noted. 10 mL Gadavist      Comparison: Brain MRI 01/05/2023      Findings:       Right Carotid:  No evidence of dissection, aneurysm or significant stenosis.       Left Carotid:   No evidence of dissection, aneurysm or significant stenosis.       Vertebral arteries:   No evidence of dissection, aneurysm or significant stenosis.  Vertebral arteries are codominant      Arch: Normal 3 vessel origin. Nothing for great vessel origin stenosis. No dissection of the great vessel origins.      Head: Fetal origin and right PCA with large caliber left posterior communicating artery with the posterior circulation. Symmetric branch arborization PCA vascular territory. Symmetric branch arborization SIMONA and MCA vascular territory. Infundibular    origin right fetal origin PCA. Endoluminal irregularity of the distal right cavernous ICA segment at the anterior genu level probably represents a moderate stenosis 35-50% in degree. Calcification of the vessel in this region could represent  over    estimation of true stenosis. No saccular type aneurysm is noted.   The distal ICAs and basilar artery are patent.      Impression:    1. No evidence for stenosis or dissection of the major neck arteries or great vessel origins.   2. Endoluminal irregularity of the distal right cavernous ICA segment could represent calcification and/or mild to moderate stenosis. No high-grade stenosis. Infundibular  origin of right fetal origin PCA with no saccular type aneurysm. Symmetric branch    arborization SIMONA, MCA, and PCA vascular territory overall.      MR Head w/o Contrast Angiogram   Final Result   IMPRESSION:   1.  Normal head MRI.      MRA HEAD AND NECK WITHOUT AND WITH CONTRAST      Clinical History: ; dizzy;       Technique: 3-D Time-of-flight MRA of the head without contrast. Unenhanced 2-D Time-of-flight and gadolinium-enhanced 3-D Time-of-flight images of the neck arteries.  3D reconstructions were obtained.  Estimates of carotid stenosis are made relative to    the distal internal carotid artery diameters except as noted. 10 mL Gadavist      Comparison: Brain MRI 01/05/2023      Findings:       Right Carotid:  No evidence of dissection, aneurysm or significant stenosis.       Left Carotid:   No evidence of dissection, aneurysm or significant stenosis.       Vertebral arteries:   No evidence of dissection, aneurysm or significant stenosis.  Vertebral arteries are codominant      Arch: Normal 3 vessel origin. Nothing for great vessel origin stenosis. No dissection of the great vessel origins.      Head: Fetal origin and right PCA with large caliber left posterior communicating artery with the posterior circulation. Symmetric branch arborization PCA vascular territory. Symmetric branch arborization SIMONA and MCA vascular territory. Infundibular    origin right fetal origin PCA. Endoluminal irregularity of the distal right cavernous ICA segment at the anterior genu level probably represents a moderate stenosis  35-50% in degree. Calcification of the vessel in this region could represent over    estimation of true stenosis. No saccular type aneurysm is noted.   The distal ICAs and basilar artery are patent.      Impression:    1. No evidence for stenosis or dissection of the major neck arteries or great vessel origins.   2. Endoluminal irregularity of the distal right cavernous ICA segment could represent calcification and/or mild to moderate stenosis. No high-grade stenosis. Infundibular  origin of right fetal origin PCA with no saccular type aneurysm. Symmetric branch    arborization SIMONA, MCA, and PCA vascular territory overall.      MR Brain w/o & w Contrast   Final Result   IMPRESSION:   1.  Normal head MRI.      MRA HEAD AND NECK WITHOUT AND WITH CONTRAST      Clinical History: ; dizzy;       Technique: 3-D Time-of-flight MRA of the head without contrast. Unenhanced 2-D Time-of-flight and gadolinium-enhanced 3-D Time-of-flight images of the neck arteries.  3D reconstructions were obtained.  Estimates of carotid stenosis are made relative to    the distal internal carotid artery diameters except as noted. 10 mL Gadavist      Comparison: Brain MRI 01/05/2023      Findings:       Right Carotid:  No evidence of dissection, aneurysm or significant stenosis.       Left Carotid:   No evidence of dissection, aneurysm or significant stenosis.       Vertebral arteries:   No evidence of dissection, aneurysm or significant stenosis.  Vertebral arteries are codominant      Arch: Normal 3 vessel origin. Nothing for great vessel origin stenosis. No dissection of the great vessel origins.      Head: Fetal origin and right PCA with large caliber left posterior communicating artery with the posterior circulation. Symmetric branch arborization PCA vascular territory. Symmetric branch arborization SIMONA and MCA vascular territory. Infundibular    origin right fetal origin PCA. Endoluminal irregularity of the distal right cavernous ICA  segment at the anterior genu level probably represents a moderate stenosis 35-50% in degree. Calcification of the vessel in this region could represent over    estimation of true stenosis. No saccular type aneurysm is noted.   The distal ICAs and basilar artery are patent.      Impression:    1. No evidence for stenosis or dissection of the major neck arteries or great vessel origins.   2. Endoluminal irregularity of the distal right cavernous ICA segment could represent calcification and/or mild to moderate stenosis. No high-grade stenosis. Infundibular  origin of right fetal origin PCA with no saccular type aneurysm. Symmetric branch    arborization SIMONA, MCA, and PCA vascular territory overall.         Report per radiology    Laboratory:  Labs Ordered and Resulted from Time of ED Arrival to Time of ED Departure   CBC WITH PLATELETS AND DIFFERENTIAL - Abnormal       Result Value    WBC Count 9.4      RBC Count 4.03      Hemoglobin 11.8      Hematocrit 37.9      MCV 94      MCH 29.3      MCHC 31.1 (*)     RDW 16.4 (*)     Platelet Count 803 (*)     % Neutrophils 67      % Lymphocytes 22      % Monocytes 8      % Eosinophils 3      % Basophils 0      % Immature Granulocytes 0      NRBCs per 100 WBC 0      Absolute Neutrophils 6.2      Absolute Lymphocytes 2.1      Absolute Monocytes 0.8      Absolute Eosinophils 0.3      Absolute Basophils 0.0      Absolute Immature Granulocytes 0.0      Absolute NRBCs 0.0     BASIC METABOLIC PANEL - Normal    Sodium 138      Potassium 4.0      Chloride 101      Carbon Dioxide (CO2) 30      Anion Gap 7      Urea Nitrogen 24      Creatinine 0.78      Calcium 9.1      Glucose 94      GFR Estimate 74     HEPATIC FUNCTION PANEL - Normal    Bilirubin Total 0.3      Bilirubin Direct <0.1      Protein Total 7.6      Albumin 3.9      Alkaline Phosphatase 91      AST 21      ALT 26     TROPONIN I - Normal    Troponin I High Sensitivity 6     ROUTINE UA WITH MICROSCOPIC REFLEX TO CULTURE         Emergency Department Course & Assessments:     Consultations/Discussion of Management or Tests:    ED Course as of 01/05/23 2218   Thu Jan 05, 2023 2158 I rechecked the patient and spoke to them regarding the EKG results.   2201 I rechecked the patient and explained findings.         Disposition:  The patient was discharged to home.     Impression & Plan    Medical Decision Making:  This is an 87-year-old female brought in by family due to concerns about dizziness.  She has a history of vertigo and has been seen in the dizzy balance clinic in the past as well.  She and her family though are concerned that this is different than her previous vertigo.  She also has some vague discomfort in the back of her head currently.  She does not have any other worrisome symptoms or neurologic exam findings.  However given her previous MRIs from May 2021, which showed a short segment of stenosis to the proximal left vertebral artery, I felt it was reasonable and appropriate to proceed with the above MRIs again.  She also had the above blood work and EKG obtained.  Her work-up today is unremarkable, which is reassuring. Specifically, there does not appear to be any stenosis currently, which is somewhat surprising.  Regardless, I feel that she is safe for discharge and outpatient management, as she does not appear to have had a stroke and does not appear to have vertebrobasilar insufficiency.  I recommended close outpatient follow-up and certainly returning with any concerns or worsening symptoms as well.      Diagnosis:    ICD-10-CM    1. Dizziness  R42       2. Vertigo  R42           Scribe Disclosure:  I, ALEENA TARANGOUA, am serving as a scribe at 10:03 PM on 1/5/2023 to document services personally performed by Garry Vicente MD based on my observations and the provider's statements to me.     1/5/2023   Garry Vicente MD Lashkowitz, Seth H, MD  01/05/23 8874

## 2023-01-27 ENCOUNTER — HOSPITAL ENCOUNTER (EMERGENCY)
Facility: CLINIC | Age: 88
Discharge: HOME | End: 2023-01-27
Attending: EMERGENCY MEDICINE | Admitting: EMERGENCY MEDICINE
Payer: COMMERCIAL

## 2023-01-27 VITALS
DIASTOLIC BLOOD PRESSURE: 60 MMHG | TEMPERATURE: 99 F | HEIGHT: 62 IN | HEART RATE: 70 BPM | RESPIRATION RATE: 20 BRPM | SYSTOLIC BLOOD PRESSURE: 148 MMHG | WEIGHT: 100 LBS | BODY MASS INDEX: 18.4 KG/M2 | OXYGEN SATURATION: 97 %

## 2023-01-27 DIAGNOSIS — R42 VERTIGO: ICD-10-CM

## 2023-01-27 LAB
ANION GAP SERPL CALCULATED.3IONS-SCNC: 9 MMOL/L (ref 7–15)
BASOPHILS # BLD AUTO: 0 10E3/UL (ref 0–0.2)
BASOPHILS NFR BLD AUTO: 0 %
BUN SERPL-MCNC: 18.7 MG/DL (ref 8–23)
CALCIUM SERPL-MCNC: 9.3 MG/DL (ref 8.8–10.2)
CHLORIDE SERPL-SCNC: 95 MMOL/L (ref 98–107)
CREAT SERPL-MCNC: 0.82 MG/DL (ref 0.51–0.95)
DEPRECATED HCO3 PLAS-SCNC: 28 MMOL/L (ref 22–29)
EOSINOPHIL # BLD AUTO: 0.1 10E3/UL (ref 0–0.7)
EOSINOPHIL NFR BLD AUTO: 1 %
ERYTHROCYTE [DISTWIDTH] IN BLOOD BY AUTOMATED COUNT: 16.2 % (ref 10–15)
GFR SERPL CREATININE-BSD FRML MDRD: 69 ML/MIN/1.73M2
GLUCOSE SERPL-MCNC: 112 MG/DL (ref 70–99)
HCT VFR BLD AUTO: 39.4 % (ref 35–47)
HGB BLD-MCNC: 12.6 G/DL (ref 11.7–15.7)
HOLD SPECIMEN: NORMAL
HOLD SPECIMEN: NORMAL
IMM GRANULOCYTES # BLD: 0 10E3/UL
IMM GRANULOCYTES NFR BLD: 0 %
LYMPHOCYTES # BLD AUTO: 1.2 10E3/UL (ref 0.8–5.3)
LYMPHOCYTES NFR BLD AUTO: 15 %
MAGNESIUM SERPL-MCNC: 2 MG/DL (ref 1.7–2.3)
MCH RBC QN AUTO: 29.4 PG (ref 26.5–33)
MCHC RBC AUTO-ENTMCNC: 32 G/DL (ref 31.5–36.5)
MCV RBC AUTO: 92 FL (ref 78–100)
MONOCYTES # BLD AUTO: 0.4 10E3/UL (ref 0–1.3)
MONOCYTES NFR BLD AUTO: 5 %
NEUTROPHILS # BLD AUTO: 6.1 10E3/UL (ref 1.6–8.3)
NEUTROPHILS NFR BLD AUTO: 79 %
NRBC # BLD AUTO: 0 10E3/UL
NRBC BLD AUTO-RTO: 0 /100
PLATELET # BLD AUTO: 602 10E3/UL (ref 150–450)
POTASSIUM SERPL-SCNC: 3.9 MMOL/L (ref 3.4–5.3)
RBC # BLD AUTO: 4.28 10E6/UL (ref 3.8–5.2)
SODIUM SERPL-SCNC: 132 MMOL/L (ref 136–145)
WBC # BLD AUTO: 7.8 10E3/UL (ref 4–11)

## 2023-01-27 PROCEDURE — 250N000011 HC RX IP 250 OP 636: Performed by: EMERGENCY MEDICINE

## 2023-01-27 PROCEDURE — 99284 EMERGENCY DEPT VISIT MOD MDM: CPT | Mod: 25

## 2023-01-27 PROCEDURE — 96374 THER/PROPH/DIAG INJ IV PUSH: CPT

## 2023-01-27 PROCEDURE — 83735 ASSAY OF MAGNESIUM: CPT | Performed by: EMERGENCY MEDICINE

## 2023-01-27 PROCEDURE — 96376 TX/PRO/DX INJ SAME DRUG ADON: CPT

## 2023-01-27 PROCEDURE — 80048 BASIC METABOLIC PNL TOTAL CA: CPT | Performed by: EMERGENCY MEDICINE

## 2023-01-27 PROCEDURE — 36415 COLL VENOUS BLD VENIPUNCTURE: CPT | Performed by: EMERGENCY MEDICINE

## 2023-01-27 PROCEDURE — 250N000009 HC RX 250: Performed by: EMERGENCY MEDICINE

## 2023-01-27 PROCEDURE — 250N000013 HC RX MED GY IP 250 OP 250 PS 637: Performed by: EMERGENCY MEDICINE

## 2023-01-27 PROCEDURE — 96361 HYDRATE IV INFUSION ADD-ON: CPT

## 2023-01-27 PROCEDURE — 85025 COMPLETE CBC W/AUTO DIFF WBC: CPT | Performed by: EMERGENCY MEDICINE

## 2023-01-27 PROCEDURE — 258N000003 HC RX IP 258 OP 636: Performed by: EMERGENCY MEDICINE

## 2023-01-27 RX ORDER — MECLIZINE HYDROCHLORIDE 25 MG/1
25 TABLET ORAL 3 TIMES DAILY PRN
Qty: 30 TABLET | Refills: 0 | Status: SHIPPED | OUTPATIENT
Start: 2023-01-27 | End: 2023-06-07

## 2023-01-27 RX ORDER — ONDANSETRON 2 MG/ML
4 INJECTION INTRAMUSCULAR; INTRAVENOUS ONCE
Status: COMPLETED | OUTPATIENT
Start: 2023-01-27 | End: 2023-01-27

## 2023-01-27 RX ORDER — MECLIZINE HYDROCHLORIDE 25 MG/1
25 TABLET ORAL ONCE
Status: COMPLETED | OUTPATIENT
Start: 2023-01-27 | End: 2023-01-27

## 2023-01-27 RX ORDER — ONDANSETRON 4 MG/1
4 TABLET, ORALLY DISINTEGRATING ORAL EVERY 6 HOURS PRN
Qty: 10 TABLET | Refills: 0 | Status: SHIPPED | OUTPATIENT
Start: 2023-01-27 | End: 2023-01-30

## 2023-01-27 RX ORDER — ONDANSETRON 2 MG/ML
4 INJECTION INTRAMUSCULAR; INTRAVENOUS EVERY 30 MIN PRN
Status: DISCONTINUED | OUTPATIENT
Start: 2023-01-27 | End: 2023-01-27 | Stop reason: HOSPADM

## 2023-01-27 RX ORDER — SCOLOPAMINE TRANSDERMAL SYSTEM 1 MG/1
1 PATCH, EXTENDED RELEASE TRANSDERMAL
Qty: 4 PATCH | Refills: 0 | Status: SHIPPED | OUTPATIENT
Start: 2023-01-27 | End: 2023-06-07

## 2023-01-27 RX ORDER — SCOLOPAMINE TRANSDERMAL SYSTEM 1 MG/1
1 PATCH, EXTENDED RELEASE TRANSDERMAL
Status: DISCONTINUED | OUTPATIENT
Start: 2023-01-27 | End: 2023-01-27 | Stop reason: HOSPADM

## 2023-01-27 RX ADMIN — MECLIZINE HYDROCHLORIDE 25 MG: 25 TABLET ORAL at 13:24

## 2023-01-27 RX ADMIN — ONDANSETRON 4 MG: 2 INJECTION INTRAMUSCULAR; INTRAVENOUS at 18:26

## 2023-01-27 RX ADMIN — SODIUM CHLORIDE 1000 ML: 9 INJECTION, SOLUTION INTRAVENOUS at 18:26

## 2023-01-27 RX ADMIN — SCOPALAMINE 1 PATCH: 1 PATCH, EXTENDED RELEASE TRANSDERMAL at 18:28

## 2023-01-27 RX ADMIN — ONDANSETRON 4 MG: 2 INJECTION INTRAMUSCULAR; INTRAVENOUS at 13:24

## 2023-01-27 ASSESSMENT — ACTIVITIES OF DAILY LIVING (ADL): ADLS_ACUITY_SCORE: 35

## 2023-01-27 ASSESSMENT — ENCOUNTER SYMPTOMS
VOMITING: 1
NECK PAIN: 0
DIZZINESS: 1

## 2023-01-27 NOTE — ED TRIAGE NOTES
Pt presents with daughter for complaints of dizziness. Pt reports she has chronic dizziness/vertigo and PT/OT came to her house yesterday and did movement to reduce the vertigo and made sx worse. Pt reports mild HA and dizziness since yesterday. Pt vomiting yesterday and nausea today.  Pt took meclizine at 0830. Pt hx of low NA     Triage Assessment       Row Name 01/27/23 1312       Triage Assessment (Adult)    Airway WDL WDL       Respiratory WDL    Respiratory WDL WDL       Skin Circulation/Temperature WDL    Skin Circulation/Temperature WDL WDL       Cardiac WDL    Cardiac WDL WDL       Peripheral/Neurovascular WDL    Peripheral Neurovascular WDL WDL       Cognitive/Neuro/Behavioral WDL    Cognitive/Neuro/Behavioral WDL WDL

## 2023-01-27 NOTE — ED NOTES
"Rapid Assessment Note    History:   Mejia Ordaz is a 87 year old female presenting with ongoing dizziness/vertigo occurring for some time but worsening yesterday. A family member states that the patient, who has a history of vertigo and hyponatremia, was \"doing fine\" yesterday. At around 2 pm, however, when physical therapy arrived to help and upon performing the Epley maneuver her dizziness worsened. The patient's symptoms worsen with movement. Denies any fever. Also denies any recent falls or head injuries. The patient last took Meclizine around 5 hours ago but has not taken any other prescribed medications today.    Exam:   General:  Alert, interactive  Cardiovascular:  Well perfused  Lungs:  No respiratory distress, no accessory muscle use  Neuro:  Moving all 4 extremities  Skin:  Warm, dry  Psych:  Normal affect    Plan of Care:   I evaluated the patient and developed an initial plan of care. I discussed this plan and explained that I, or one of my partners, would be returning to complete the evaluation.     I, Patrice Hired, am serving as a scribe to document services personally performed by Gopi Chu MD, based on my observations and the provider's statements to me.    11/5/2018  [employer]    Portions of this medical record were completed by a scribe. UPON MY REVIEW AND AUTHENTICATION BY ELECTRONIC SIGNATURE, this confirms (a) I performed the applicable clinical services, and (b) the record is accurate.      Gopi Chu MD  01/27/23 1332    "

## 2023-01-28 NOTE — ED NOTES
Pt able to get up slowly and wait for slight dizziness to recede.  Pt then walked with walker.  Denies dizziness while walking.  Pt very steady on feet,.  Dtr states she can stay with her tonight so she wont be alone.

## 2023-01-28 NOTE — DISCHARGE INSTRUCTIONS
We have treated you again for vertigo.  He had 2 MRIs in the past for similar symptoms.  If you develop severe headache or double vision return to the emergency room.  Use medications for dizziness when needed.  Please can state continue your vestibular rehab and your primary care follow-up.  Thanks for your patience today.

## 2023-01-28 NOTE — ED PROVIDER NOTES
"  History     Chief Complaint:  Dizziness       The history is provided by a relative.      Mejia Ordaz is a 87 year old female presenting with ongoing dizziness/vertigo occurring for some time but worsening yesterday. A family member states that the patient, who has a history of vertigo and hyponatremia, was \"doing fine\" yesterday. At around 2 pm, however, when physical therapy arrived to help and upon performing the Epley maneuver her dizziness worsened. The patient's symptoms worsen with movement and opening of the eyes. Turning her head also exacerbates the dizziness. Endorses vomiting yesterday. Denies any fever, neck pain, vision changes or double vision. Also denies any recent falls or head injuries. The patient last took Meclizine around 5 hours ago but has not taken any other prescribed medications today.    MRI Neck/Head - 1/5/23   IMPRESSION:  1.  Normal head MRI.    Independent Historian: No  Daughter    Review of External Notes: MRI Neck/Brain from 1/5/23    ROS:  Review of Systems   Eyes: Negative for visual disturbance.   Gastrointestinal: Positive for vomiting.   Musculoskeletal: Negative for neck pain.   Neurological: Positive for dizziness.   All other systems reviewed and are negative.    Allergies:  Hydrochlorothiazide  Penicillins  Scopolamine      Medications:    ASA  Lipitor  Zithromax  Omnicef  Flexeril  Neurontin  Atarax  Lidocare  Roxicodone  Pericolace  Celebrex  Hydrea  Zestril     Past Medical History:    Osteoporosis  Hypertension  Chronic kidney disease  Hyperlipidemia  Microabuminuria  PONV  Preauricular cyst  Vertigo  Bell's palsy  Trigeminal neuralgia  Atrophic vaginitis  Chronic UTI  Arteritis  Thrombocytopenia  Arthritis  Depression  Anemia  Myeloproliferative disease  Hypercholesteremia  Hyponatremia  GERD     Past Surgical History:    Total hip arthroplasty (Right anterior)  Balloon compression rhizotomy  Colonoscopy  Excise lesion Head  Gastritis esophagoscopy  Hysterectomy " "     Family History:    Father: Cerebrovascular disease     Social History:  Patient reports that she has never smoked. She has never used smokeless tobacco. She reports that she does not drink alcohol and does not use drugs.  PCP: Carlos Méndez     Physical Exam     Patient Vitals for the past 24 hrs:   BP Temp Temp src Pulse Resp SpO2 Height Weight   01/27/23 1855 -- -- -- -- -- 97 % -- --   01/27/23 1840 -- -- -- -- -- 96 % -- --   01/27/23 1311 (!) 148/60 99  F (37.2  C) Temporal 70 20 98 % 1.575 m (5' 2\") 45.4 kg (100 lb)        Physical Exam  Vitals reviewed.   HENT:      Head: Normocephalic.      Right Ear: Tympanic membrane normal.      Left Ear: Tympanic membrane normal.      Nose: Nose normal.      Mouth/Throat:      Mouth: Mucous membranes are moist.   Eyes:      Pupils: Pupils are equal, round, and reactive to light.      Comments: Mild nystagmus when looking to the left.   Cardiovascular:      Rate and Rhythm: Normal rate and regular rhythm.   Pulmonary:      Effort: Pulmonary effort is normal.   Abdominal:      General: Abdomen is flat.   Musculoskeletal:         General: Normal range of motion.   Skin:     General: Skin is warm.      Capillary Refill: Capillary refill takes less than 2 seconds.   Neurological:      General: No focal deficit present.      Mental Status: She is alert and oriented to person, place, and time. Mental status is at baseline.   Psychiatric:         Mood and Affect: Mood normal.           Emergency Department Course     Laboratory:  Labs Ordered and Resulted from Time of ED Arrival to Time of ED Departure   BASIC METABOLIC PANEL - Abnormal       Result Value    Sodium 132 (*)     Potassium 3.9      Chloride 95 (*)     Carbon Dioxide (CO2) 28      Anion Gap 9      Urea Nitrogen 18.7      Creatinine 0.82      Calcium 9.3      Glucose 112 (*)     GFR Estimate 69     CBC WITH PLATELETS AND DIFFERENTIAL - Abnormal    WBC Count 7.8      RBC Count 4.28      Hemoglobin 12.6   "    Hematocrit 39.4      MCV 92      MCH 29.4      MCHC 32.0      RDW 16.2 (*)     Platelet Count 602 (*)     % Neutrophils 79      % Lymphocytes 15      % Monocytes 5      % Eosinophils 1      % Basophils 0      % Immature Granulocytes 0      NRBCs per 100 WBC 0      Absolute Neutrophils 6.1      Absolute Lymphocytes 1.2      Absolute Monocytes 0.4      Absolute Eosinophils 0.1      Absolute Basophils 0.0      Absolute Immature Granulocytes 0.0      Absolute NRBCs 0.0     MAGNESIUM - Normal    Magnesium 2.0        Emergency Department Course & Assessments:    Interventions:  Medications   ondansetron (ZOFRAN) injection 4 mg (4 mg Intravenous Given 1/27/23 1324)   scopolamine (TRANSDERM) 72 hr patch 1 patch (1 patch Transdermal Patch/Med Applied 1/27/23 1828)     And   scopolamine (TRANSDERM-SCOP) Patch in Place ( Transdermal Patch in Place 1/27/23 1828)   meclizine (ANTIVERT) tablet 25 mg (25 mg Oral Given 1/27/23 1324)   0.9% sodium chloride BOLUS (1,000 mLs Intravenous New Bag 1/27/23 1826)   ondansetron (ZOFRAN) injection 4 mg (4 mg Intravenous Given 1/27/23 1826)      Independent Interpretation (X-rays, CTs, rhythm strip):  N/A    Consultations/Discussion of Management or Tests:   ED Course as of 01/27/23 1923 Fri Jan 27, 2023 1803 I obtained history and examined the patient.      Social Determinants of Health affecting care:  N/A    Assessments:  1915 Rechecked.   1925 Rechecked and updated.     Disposition:  The patient was discharged to home.     Impression & Plan      CMS Diagnoses: None    Medical Decision Making:  Patient presents with daughter with concerns for dizziness described as the room spinning.  Patient has a history of vertigo and is undergone vestibular rehab.  Her symptoms are consistent with likely peripheral vertigo no red flags no double vision no headache.  Patient was given meclizine and scopolamine with improvement.  Did consider imaging due to age and dizziness however patient's had  2 prior MRIs for similar symptoms and were negative.  Family feel comfortable without imaging as it seems to be a similar event.  Patient was offered reassurance per as needed medications and discharged home.    Diagnosis:    ICD-10-CM    1. Vertigo  R42            Discharge Medications:  Discharge Medication List as of 1/27/2023  7:35 PM      START taking these medications    Details   meclizine (ANTIVERT) 25 MG tablet Take 1 tablet (25 mg) by mouth 3 times daily as needed for dizziness, Disp-30 tablet, R-0, Local Print      ondansetron (ZOFRAN ODT) 4 MG ODT tab Take 1 tablet (4 mg) by mouth every 6 hours as needed for nausea, Disp-10 tablet, R-0, Local Print      scopolamine (TRANSDERM) 1 MG/3DAYS 72 hr patch Place 1 patch onto the skin every 72 hours, Disp-4 patch, R-0, Local Print               Scribe Disclosure:  I, TIAGO SHAIKH, am serving as a scribe at 6:29 PM on 1/27/2023 to document services personally performed by Dionicio Feliz MD based on my observations and the provider's statements to me.   1/27/2023   Dionicio Feliz MD Goodman, Brian Samuel, MD  01/29/23 9527

## 2023-06-06 ENCOUNTER — LAB REQUISITION (OUTPATIENT)
Dept: LAB | Facility: CLINIC | Age: 88
End: 2023-06-06

## 2023-06-06 VITALS
DIASTOLIC BLOOD PRESSURE: 65 MMHG | TEMPERATURE: 97.6 F | SYSTOLIC BLOOD PRESSURE: 140 MMHG | RESPIRATION RATE: 18 BRPM | HEART RATE: 64 BPM | OXYGEN SATURATION: 97 % | BODY MASS INDEX: 18.29 KG/M2 | HEIGHT: 62 IN

## 2023-06-06 DIAGNOSIS — Z00.01 ENCOUNTER FOR GENERAL ADULT MEDICAL EXAMINATION WITH ABNORMAL FINDINGS: ICD-10-CM

## 2023-06-06 RX ORDER — AMLODIPINE BESYLATE 5 MG/1
5 TABLET ORAL DAILY
COMMUNITY
End: 2023-06-15

## 2023-06-06 RX ORDER — CALCITONIN SALMON 200 [IU]/.09ML
SPRAY, METERED NASAL
COMMUNITY

## 2023-06-06 RX ORDER — GABAPENTIN 100 MG/1
200 CAPSULE ORAL AT BEDTIME
COMMUNITY
End: 2023-06-15

## 2023-06-06 RX ORDER — POLYETHYLENE GLYCOL 3350 17 G/17G
17 POWDER, FOR SOLUTION ORAL DAILY PRN
COMMUNITY

## 2023-06-06 RX ORDER — ALBUTEROL SULFATE 90 UG/1
1-2 AEROSOL, METERED RESPIRATORY (INHALATION) 4 TIMES DAILY PRN
COMMUNITY

## 2023-06-06 RX ORDER — CHOLECALCIFEROL (VITAMIN D3) 50 MCG
1 TABLET ORAL DAILY
COMMUNITY

## 2023-06-06 RX ORDER — SENNOSIDES 8.6 MG
2 TABLET ORAL 2 TIMES DAILY
COMMUNITY

## 2023-06-06 RX ORDER — LIDOCAINE 40 MG/G
CREAM TOPICAL DAILY
COMMUNITY
End: 2023-06-07

## 2023-06-06 RX ORDER — MECLIZINE HYDROCHLORIDE 25 MG/1
25 TABLET ORAL 2 TIMES DAILY PRN
COMMUNITY

## 2023-06-06 RX ORDER — OXYCODONE HYDROCHLORIDE 5 MG/1
5 TABLET ORAL EVERY 4 HOURS PRN
COMMUNITY
End: 2023-06-13

## 2023-06-07 ENCOUNTER — TRANSITIONAL CARE UNIT VISIT (OUTPATIENT)
Dept: GERIATRICS | Facility: CLINIC | Age: 88
End: 2023-06-07
Payer: COMMERCIAL

## 2023-06-07 ENCOUNTER — LAB REQUISITION (OUTPATIENT)
Dept: LAB | Facility: CLINIC | Age: 88
End: 2023-06-07

## 2023-06-07 DIAGNOSIS — I10 HYPERTENSION GOAL BP (BLOOD PRESSURE) < 140/90: ICD-10-CM

## 2023-06-07 DIAGNOSIS — E78.2 MIXED HYPERLIPIDEMIA: Primary | ICD-10-CM

## 2023-06-07 DIAGNOSIS — N18.2 CKD (CHRONIC KIDNEY DISEASE) STAGE 2, GFR 60-89 ML/MIN: ICD-10-CM

## 2023-06-07 DIAGNOSIS — R42 VERTIGO: ICD-10-CM

## 2023-06-07 DIAGNOSIS — E87.1 HYPO-OSMOLALITY AND HYPONATREMIA: ICD-10-CM

## 2023-06-07 PROCEDURE — 86481 TB AG RESPONSE T-CELL SUSP: CPT | Performed by: NURSE PRACTITIONER

## 2023-06-07 PROCEDURE — 99309 SBSQ NF CARE MODERATE MDM 30: CPT | Performed by: NURSE PRACTITIONER

## 2023-06-07 PROCEDURE — P9604 ONE-WAY ALLOW PRORATED TRIP: HCPCS | Performed by: NURSE PRACTITIONER

## 2023-06-07 PROCEDURE — 36415 COLL VENOUS BLD VENIPUNCTURE: CPT | Performed by: NURSE PRACTITIONER

## 2023-06-07 RX ORDER — LANOLIN ALCOHOL/MO/W.PET/CERES
6 CREAM (GRAM) TOPICAL AT BEDTIME
COMMUNITY

## 2023-06-07 RX ORDER — ACETAMINOPHEN 500 MG
1000 TABLET ORAL 3 TIMES DAILY
COMMUNITY

## 2023-06-07 RX ORDER — CELECOXIB 100 MG/1
100 CAPSULE ORAL 2 TIMES DAILY
COMMUNITY
End: 2023-06-15

## 2023-06-07 RX ORDER — HYDROXYZINE PAMOATE 25 MG/1
25 CAPSULE ORAL 3 TIMES DAILY
COMMUNITY
End: 2023-06-13

## 2023-06-07 RX ORDER — LIDOCAINE 4 G/G
1 PATCH TOPICAL EVERY 24 HOURS
COMMUNITY

## 2023-06-07 NOTE — PROGRESS NOTES
[facility] GERIATRICS    PRIMARY CARE PROVIDER AND CLINIC:  Carlos Méndez MD, [address] / [place] [address]  Chief Complaint   Patient presents with     Hospital F/U      [place] Medical Record Number:  [id number]  Place of Service where encounter took place:  [facility] ([facility]) [[id number]]    Mejia Ordaz  is a 87 year old  (1935), admitted to the above facility from  [facility] . Hospital stay 6/3/23 through 6/6/23..   HPI:    This is an 87 y.o. female with a PMHx of osteoarthritis, HTN, hypercholesterolemia, thrombocytopenia, R MARGI, and giant cell arteritis who sustained a fall and presented to the hospital with back pain. She was found to have a mild T12 compression fracture, was treated with a TLSO brace, and discharged home with home health care where she unfortunately sustained another fall that resulted in severe back pain that shot down her legs and caused episodic numbness. She again presented to the hospital with repeat imaging showing a severe burst fracture of T12 with greater than 75% vertebral body height loss. She was transferred to another facility for an orthopedic spine consult and possible surgery, however conservative management with TLSO brace and pain control was again recommended. She was discharged to [facility] for rehab.     CODE STATUS/ADVANCE DIRECTIVES DISCUSSION:  Prior  CPR/Full code   ALLERGIES:   Allergies   Allergen Reactions     Hydrochlorothiazide      Other reaction(s): Hyponatremia     Penicillins Itching     Scopolamine Nausea     Nausea for two weeks after patch was removed.      PAST MEDICAL HISTORY:   Past Medical History:   Diagnosis Date     Anemia      Arteritis (H)     CHRONIC     Arthritis      Atrophic vaginitis 11/13/2012     Bell's palsy     rt facial pain controlled by Tegretal     Chronic steroid use 08/19/2015     Chronic UTI 11/13/2012     CKD (chronic kidney disease) stage 2, GFR 60-89 ml/min 06/02/2012     Depression       Headache(784.0)      Hyperlipidemia LDL goal <100 06/02/2012     Hypertension goal BP (blood pressure) < 140/90 09/27/2011     Microalbuminuria 11/13/2012     Mixed hyperlipidemia 11/13/2012     Need for prophylactic hormone replacement therapy (postmenopausal)      Osteoporosis, unspecified 02/2003    on HRT, calcium and Vit.     Other and unspecified hyperlipidemia      PONV (postoperative nausea and vomiting) 08/19/2015     Thrombocytopenia (H)      Trigeminal neuralgia       PAST SURGICAL HISTORY:   has a past surgical history that includes NONSPECIFIC PROCEDURE (1970); NONSPECIFIC PROCEDURE (1999); ESOPHAGOSCOPY, DIAGNOSTIC (2009); colonoscopy (2007); Balloon compression rhizotomy (Right, 8/20/2015); Excise lesion head (Left, 8/9/2017); and Arthroplasty Hip Anterior (Right, 11/28/2022).  FAMILY HISTORY: family history includes Cerebrovascular Disease in her father.  SOCIAL HISTORY:   reports that she has never smoked. She has never used smokeless tobacco. She reports that she does not drink alcohol and does not use drugs.  Patient's living condition: lives alone    Post Discharge Medication Reconciliation Status:   MED REC REQUIRED  Post Medication Reconciliation Status:  Discharge medications reconciled and changed, see notes/orders    Current Outpatient Medications   Medication Sig     acetaminophen (TYLENOL) 500 MG tablet Take 1,000 mg by mouth 3 times daily     albuterol (PROAIR HFA/PROVENTIL HFA/VENTOLIN HFA) 108 (90 Base) MCG/ACT inhaler Inhale 1-2 puffs into the lungs 4 times daily as needed for shortness of breath, wheezing or cough     amLODIPine (NORVASC) 5 MG tablet Take 5 mg by mouth daily     aspirin 81 MG EC tablet Take 1 tablet (81 mg) by mouth daily Restart once daily (81 mg) after completing course of 325 mg daily prescribed by Ortho.     atorvastatin (LIPITOR) 10 MG tablet Take 1 tablet (10 mg) by mouth daily     calcitonin, salmon, (MIACALCIN) 200 UNIT/ACT nasal spray 1 spray Alternating  "nostrils one time a day for Acute midline thoracic back pain for 23 Days     celecoxib (CELEBREX) 100 MG capsule Take 100 mg by mouth 2 times daily     gabapentin (NEURONTIN) 100 MG capsule Take 200 mg by mouth At Bedtime     hydroxyurea (HYDREA) 500 MG capsule Take by mouth daily     hydrOXYzine (VISTARIL) 25 MG capsule Take 25 mg by mouth 3 times daily     Lidocaine (LIDOCARE) 4 % Patch Place 1 patch onto the skin every 24 hours To prevent lidocaine toxicity, patient should be patch free for 12 hrs daily. Apply to back     meclizine (ANTIVERT) 25 MG tablet Take 25 mg by mouth 2 times daily as needed for dizziness     melatonin 3 MG tablet Take 6 mg by mouth At Bedtime     oxyCODONE (ROXICODONE) 5 MG tablet Take 5 mg by mouth every 4 hours as needed for severe pain     polyethylene glycol (MIRALAX) 17 g packet Take 17 g by mouth daily as needed for constipation     sennosides (SENOKOT) 8.6 MG tablet Take 2 tablets by mouth 2 times daily     vitamin D3 (CHOLECALCIFEROL) 50 mcg (2000 units) tablet Take 1 tablet by mouth daily     No current facility-administered medications for this visit.       ROS:  10 point ROS of systems including Constitutional, Eyes, Respiratory, Cardiovascular, Gastroenterology, Genitourinary, Integumentary, Musculoskeletal, Psychiatric were all negative except for pertinent positives noted in my HPI.    Vitals:  BP (!) 140/65   Pulse 64   Temp 97.6  F (36.4  C)   Resp 18   Ht 1.575 m (5' 2\")   SpO2 97%   BMI 18.29 kg/m    Exam:  GENERAL APPEARANCE:  Alert, in no distress, thin, back pain  ENT:  Mouth and posterior oropharynx normal, moist mucous membranes  NECK:  No adenopathy,masses or thyromegaly  RESP:  lungs clear to auscultation , no respiratory distress, RA  CV:  regular rate and rhythm, no murmur, rub, or gallop  ABDOMEN:  normal bowel sounds, soft, nontender, no hepatosplenomegaly or other masses  M/S: TLSO brace on when greater than 30   SKIN:  chin and anterior neck " bruising  NEURO:   able to move all extremieis, numbness/tingling of BLE, no focal deficits  PSYCH:  oriented X 3, normal insight, judgement and memory    Lab/Diagnostic data:  WBC 8.8  Hgb 11.9    Na 132  K 4.4  Cr 0.82  GFR >60    ASSESSMENT/PLAN:    T12 Burst Fracture  S/P R TKA in 11/2022  Given conservative management, TLSO brace on when >30  in bed, sitting, and standing. Up as tolerated with no bending, twisting, or lifting >5lbs.  Continue vitamin D supplementation.   T12 fracture follow up and x-rays on 6/16 at [facility]     Pain  Continue calcitonin nasal spray, oxycodone 5mg q4hr as needed, lidocaine 4% patch to back daily, schedule tylenol 1000mg TID, start vistaril 25mg TID, restart celebrex 100mg TID, continue gabapentin 200mg at HS     HTN  Lisinopril discontinued inpatient due to cough, monitor blood pressure twice daily. Continue PTA amlodipine 5mg daily and aspirin 81mg daily    Constipation  Continue PTA miraLAX 17g packet daily prn and 2 tabs senna 8.6mg BID     Renal function  Last CR 0.82 with GFR >60, recheck BMP on 6/8    Mild hyponatremia  Last Na 132, recheck BMP 6/8    Normocytic anemia  Last Hgb 11.9, recheck CBC on 6/8    Essential thrombocytopenia   Continue PTA hydroxyurea 500mg daily, last , recheck CBC on 6/8    Hypercholesterolemia  Continue PTA atorvastatin 10mg daily    Vertigo  Continue meclizine 25 mg twice daily as needed    Insomnia  Start melatonin 6mg HS    Orders:  Increase blood pressure monitoring, increase Tylenol, restart Celebrex, start Vistaril, start melatonin, BMP/CBC recheck    Electronically signed by:  Carter Wylie NP      I was present with the student who particpated in the serivce and documentation of the note. I have verified the history and personally peformed the physical exam and medical decision making. I agree with the assessment and plan of care as documented in the note.

## 2023-06-08 LAB
ANION GAP SERPL CALCULATED.3IONS-SCNC: 7 MMOL/L (ref 7–15)
BUN SERPL-MCNC: 24.9 MG/DL (ref 8–23)
CALCIUM SERPL-MCNC: 8.2 MG/DL (ref 8.8–10.2)
CHLORIDE SERPL-SCNC: 104 MMOL/L (ref 98–107)
CREAT SERPL-MCNC: 0.81 MG/DL (ref 0.51–0.95)
DEPRECATED HCO3 PLAS-SCNC: 25 MMOL/L (ref 22–29)
ERYTHROCYTE [DISTWIDTH] IN BLOOD BY AUTOMATED COUNT: 15.4 % (ref 10–15)
GFR SERPL CREATININE-BSD FRML MDRD: 70 ML/MIN/1.73M2
GLUCOSE SERPL-MCNC: 99 MG/DL (ref 70–99)
HCT VFR BLD AUTO: 37.8 % (ref 35–47)
HGB BLD-MCNC: 12.1 G/DL (ref 11.7–15.7)
MCH RBC QN AUTO: 33.2 PG (ref 26.5–33)
MCHC RBC AUTO-ENTMCNC: 32 G/DL (ref 31.5–36.5)
MCV RBC AUTO: 104 FL (ref 78–100)
PLATELET # BLD AUTO: 436 10E3/UL (ref 150–450)
POTASSIUM SERPL-SCNC: 3.5 MMOL/L (ref 3.4–5.3)
RBC # BLD AUTO: 3.64 10E6/UL (ref 3.8–5.2)
SODIUM SERPL-SCNC: 136 MMOL/L (ref 136–145)
WBC # BLD AUTO: 8.9 10E3/UL (ref 4–11)

## 2023-06-08 PROCEDURE — 80048 BASIC METABOLIC PNL TOTAL CA: CPT | Performed by: NURSE PRACTITIONER

## 2023-06-08 PROCEDURE — 36415 COLL VENOUS BLD VENIPUNCTURE: CPT | Performed by: NURSE PRACTITIONER

## 2023-06-08 PROCEDURE — 85027 COMPLETE CBC AUTOMATED: CPT | Performed by: NURSE PRACTITIONER

## 2023-06-08 PROCEDURE — P9604 ONE-WAY ALLOW PRORATED TRIP: HCPCS | Performed by: NURSE PRACTITIONER

## 2023-06-08 NOTE — PROGRESS NOTES
[facility]  INITIAL VISIT NOTE  June 9, 2023    PRIMARY CARE PROVIDER AND CLINIC:  Carlos Méndez [address] / [place] [address]    CHIEF COMPLAINT:  Hospital follow-up/Initial visit    HPI:    Mejia Ordaz is a 87 year old  (1935) female who was seen at [facility] on June 9, 2023 for an initial visit.     Medical history is notable for hypertension, dyslipidemia, essential thrombocythemia, anemia, giant cell arteritis, UTI, Bell's palsy, trigeminal neuralgia, atrophic vaginitis, depression, osteoarthritis, and osteoporosis.    Summary of hospital course:  Patient was hospitalized at [facility] from Gr[facility] 3 through June 6, 2023 for nondisplaced right L1 transverse process fracture as well as T12 burst fracture with greater than 75% vertebral body height loss sustained after mechanical fall.  Notably, she had been recently seen at [facility] for weakness and low back pain and imaging study had shown an indeterminate mild T12 compression fracture.  She was evaluated by spine surgery and no surgical intervention was indicated.  TLSO brace was ordered.  TCU was recommended per therapies.    Patient is admitted to this facility for medical management, nursing care, and rehab.     Of note, history was obtained from patient, facility RN, and extensive review of the chart.    Today's visit:  Patient was seen in her room, while lying in bed.  She appears frail but comfortable.  She reports no significant back pain at rest but she does rate her back pain at 7- 8 out of 10 with ambulation.  She had a bowel movement yesterday.  She denies fever, chills, chest pain, palpitation, dyspnea, nausea, vomiting, abdominal pain, or urinary symptoms.      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, per CT cervical spine on Grisel 3, 2023  UTI  Left Bell's palsy  Trigeminal neuralgia  Atrophic vaginitis  Depression  Osteoarthritis  DDD of lumbar spine  Osteoporosis      Past Medical History:   Diagnosis Date     Anemia      Arteritis (H)     CHRONIC     Arthritis      Atrophic vaginitis 11/13/2012     Bell's palsy     rt facial pain controlled by Tegretal     Chronic steroid use 08/19/2015     Chronic UTI 11/13/2012     CKD (chronic kidney disease) stage 2, GFR 60-89 ml/min 06/02/2012     Depression      Headache(784.0)      Hyperlipidemia LDL goal <100 06/02/2012     Hypertension goal BP (blood pressure) < 140/90 09/27/2011     Microalbuminuria 11/13/2012     Mixed hyperlipidemia 11/13/2012     Need for prophylactic hormone replacement therapy (postmenopausal)      Osteoporosis, unspecified 02/2003    on HRT, calcium and Vit.     Other and unspecified hyperlipidemia      PONV (postoperative nausea and vomiting) 08/19/2015     Thrombocytopenia (H)      Trigeminal neuralgia        PAST SURGICAL HISTORY:   Past Surgical History:   Procedure Laterality Date     ARTHROPLASTY HIP ANTERIOR Right 11/28/2022    Procedure: RIGHT DIRECT ANTERIOR TOTAL HIP  ARTHROPLASTY;  Surgeon: Carter Diaz MD;  Location:  OR     BALLOON COMPRESSION RHIZOTOMY Right 8/20/2015    Procedure: BALLOON COMPRESSION RHIZOTOMY;  Surgeon: Luis Daniel Marino MD;  Location: [facility] OR     COLONOSCOPY  2007     EXCISE LESION HEAD Left 8/9/2017    Procedure: EXCISE LESION HEAD;  EXCISION OF PREAURICULAR CYST LEFT EAR;  Surgeon: Yordan Starkey MD;  Location: [facility] ESOPHAGOSCOPY, DIAGNOSTIC  2009    gastritis     [facility] NONSPECIFIC PROCEDURE  1970    hysterectomy BSO     [facility] NONSPECIFIC PROCEDURE  1999    flex       FAMILY HISTORY:   Family History   Problem Relation Age of Onset     Cerebrovascular  Disease Father        SOCIAL HISTORY:  Social History     Tobacco Use     Smoking status: Never     Smokeless tobacco: Never   Vaping Use     Vaping status: Not on file   Substance Use Topics     Alcohol use: No     Alcohol/week: 0.0 standard drinks of alcohol       MEDICATIONS:  Current Outpatient Medications   Medication Sig Dispense Refill     acetaminophen (TYLENOL) 500 MG tablet Take 1,000 mg by mouth 3 times daily       albuterol (PROAIR HFA/PROVENTIL HFA/VENTOLIN HFA) 108 (90 Base) MCG/ACT inhaler Inhale 1-2 puffs into the lungs 4 times daily as needed for shortness of breath, wheezing or cough       amLODIPine (NORVASC) 5 MG tablet Take 5 mg by mouth daily       aspirin 81 MG EC tablet Take 1 tablet (81 mg) by mouth daily Restart once daily (81 mg) after completing course of 325 mg daily prescribed by Ortho.       atorvastatin (LIPITOR) 10 MG tablet Take 1 tablet (10 mg) by mouth daily 90 tablet 0     calcitonin, salmon, (MIACALCIN) 200 UNIT/ACT nasal spray 1 spray Alternating nostrils one time a day for Acute midline thoracic back pain for 23 Days       celecoxib (CELEBREX) 100 MG capsule Take 100 mg by mouth 2 times daily       gabapentin (NEURONTIN) 100 MG capsule Take 200 mg by mouth At Bedtime       hydroxyurea (HYDREA) 500 MG capsule Take by mouth daily       hydrOXYzine (VISTARIL) 25 MG capsule Take 25 mg by mouth 3 times daily       Lidocaine (LIDOCARE) 4 % Patch Place 1 patch onto the skin every 24 hours To prevent lidocaine toxicity, patient should be patch free for 12 hrs daily. Apply to back       meclizine (ANTIVERT) 25 MG tablet Take 25 mg by mouth 2 times daily as needed for dizziness       melatonin 3 MG tablet Take 6 mg by mouth At Bedtime       oxyCODONE (ROXICODONE) 5 MG tablet Take 5 mg by mouth every 4 hours as needed for severe pain       polyethylene glycol (MIRALAX) 17 g packet Take 17 g by mouth daily as needed for constipation       sennosides (SENOKOT) 8.6 MG tablet Take 2 tablets  "by mouth 2 times daily       vitamin D3 (CHOLECALCIFEROL) 50 mcg (2000 units) tablet Take 1 tablet by mouth daily         MED REC REQUIRED  Post Medication Reconciliation Status: medication reconcilation previously completed during another office visit         ALLERGIES:  Allergies   Allergen Reactions     Hydrochlorothiazide      Other reaction(s): Hyponatremia     Penicillins Itching     Scopolamine Nausea     Nausea for two weeks after patch was removed.       ROS:  10 point ROS were negative other than the symptoms noted above in the HPI.    PHYSICAL EXAM:  Vital signs were reviewed in the chart.  Vital Signs: /68   Pulse 82   Temp 98.2  F (36.8  C)   Resp 16   Ht 1.575 m (5' 2\")   Wt 44.5 kg (98 lb 3.2 oz)   SpO2 98%   BMI 17.96 kg/m    General: Frail appearing but comfortable and in no acute distress  HEENT: No conjunctival pallor, no scleral icterus or injection, moist oral mucosa  Cardiovascular: Normal S1, S2, RRR  Respiratory: Lungs clear to auscultation bilaterally  GI: Abdomen soft, non-tender, non-distended, +BS  Extremities: No LE edema  Neuro: Normal speech, left facial droop, no focal weakness on gross examination  Psych: Alert and oriented x3; normal affect  Skin: No acute rash    LABORATORY/IMAGING DATA:  All relevant labs and imaging data in [facility] and/or Care Everywhere were personally reviewed today.      Most Recent 3 CBC's:Recent Labs   Lab Test 06/08/23  0639 01/27/23  1318 01/05/23  1701   WBC 8.9 7.8 9.4   HGB 12.1 12.6 11.8   * 92 94    602* 803*     Most Recent 3 BMP's:Recent Labs   Lab Test 06/08/23  0639 01/27/23  1318 01/05/23  1701    132* 138   POTASSIUM 3.5 3.9 4.0   CHLORIDE 104 95* 101   CO2 25 28 30   BUN 24.9* 18.7 24   CR 0.81 0.82 0.78   ANIONGAP 7 9 7   LEIGH 8.2* 9.3 9.1   GLC 99 112* 94         ASSESSMENT/PLAN:  Mechanical fall, subsequent encounter,  T12 burst fracture, subsequent encounter,  Nondisplaced right L1 transverse process fracture, " subsequent encounter,  Age-related osteoporosis with current pathologic fractures,  Physical deconditioning  Patient is hemodynamically stable.  Moderate pain with ambulation.  Plan:  Fall precautions  Discontinue Medrol Dosepak (methylprednisolone)  Continue pain management with acetaminophen 1000 mg 3 times daily, celecoxib 100 mg twice daily, hydroxyzine 25 mg 3 times daily, lidocaine patch, oxycodone 5 mg every 4 hours PRN, and gabapentin 200 mg at bedtime  Continue calcitonin nasal spray 200 units, 1 spray alternating nostrils daily through June 30, 2023  Continue TLSO brace when out of bed  Continue PT/OT evaluation and therapy  Follow-up with spine surgery as directed    Essential hypertension.  Previously on lisinopril; recently discontinued due to cough.  Blood pressure is now fairly controlled.  Plan:  Continue amlodipine 5 mg daily  Monitor blood pressure    Dyslipidemia.  Plan:  Continue atorvastatin 10 mg daily    Essential thrombocythemia.  Last platelet count 436,000 on June 8.  Plan:  Continue hydroxyurea 500 mg daily  Monitor platelets periodically  Follow-up with hematology as directed    Chronic mild anemia.  Baseline Hgb around 12.  Last CBC on June 8 with hemoglobin 12.1 and .  Plan:  Monitor hemoglobin periodically    Aneurysmal dilation of the aortic arch.  Measuring 3.3 cm, per CT cervical spine on Grisel 3, 2023.  Plan:  Follow-up as outpatient    Slow transit constipation.  Plan:  Continue the bowel regimen        Orders written by provider at facility:  Discontinue Medrol Dosepak (methylprednisolone)        Disclaimer: This note may contain text created using speech-recognition software and may contain unintended word substitutions.      Electronically signed by:  Misti Gonsalez MD

## 2023-06-09 ENCOUNTER — TRANSITIONAL CARE UNIT VISIT (OUTPATIENT)
Dept: GERIATRICS | Facility: CLINIC | Age: 88
End: 2023-06-09
Payer: COMMERCIAL

## 2023-06-09 VITALS
SYSTOLIC BLOOD PRESSURE: 108 MMHG | HEART RATE: 82 BPM | RESPIRATION RATE: 16 BRPM | WEIGHT: 98.2 LBS | TEMPERATURE: 98.2 F | OXYGEN SATURATION: 98 % | HEIGHT: 62 IN | BODY MASS INDEX: 18.07 KG/M2 | DIASTOLIC BLOOD PRESSURE: 68 MMHG

## 2023-06-09 DIAGNOSIS — I10 ESSENTIAL HYPERTENSION: ICD-10-CM

## 2023-06-09 DIAGNOSIS — D47.3 ESSENTIAL THROMBOCYTHEMIA (H): ICD-10-CM

## 2023-06-09 DIAGNOSIS — I71.22 ANEURYSM OF AORTIC ARCH WITHOUT RUPTURE (H): ICD-10-CM

## 2023-06-09 DIAGNOSIS — M80.00XD AGE-RELATED OSTEOPOROSIS WITH CURRENT PATHOLOGICAL FRACTURE WITH ROUTINE HEALING, SUBSEQUENT ENCOUNTER: ICD-10-CM

## 2023-06-09 DIAGNOSIS — S22.081A T12 BURST FRACTURE (H): ICD-10-CM

## 2023-06-09 DIAGNOSIS — D64.9 CHRONIC ANEMIA: ICD-10-CM
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
## 2023-06-09 DIAGNOSIS — E78.5 DYSLIPIDEMIA: ICD-10-CM

## 2023-06-09 DIAGNOSIS — K59.01 SLOW TRANSIT CONSTIPATION: ICD-10-CM

## 2023-06-09 DIAGNOSIS — R53.81 PHYSICAL DECONDITIONING: ICD-10-CM

## 2023-06-09 LAB
GAMMA INTERFERON BACKGROUND BLD IA-ACNC: 0 IU/ML
M TB IFN-G BLD-IMP: NEGATIVE
M TB IFN-G CD4+ BCKGRND COR BLD-ACNC: 2.96 IU/ML
MITOGEN IGNF BCKGRD COR BLD-ACNC: 0 IU/ML
MITOGEN IGNF BCKGRD COR BLD-ACNC: 0.02 IU/ML
QUANTIFERON MITOGEN: 2.96 IU/ML
QUANTIFERON NIL TUBE: 0 IU/ML
QUANTIFERON TB1 TUBE: 0 IU/ML
QUANTIFERON TB2 TUBE: 0.02

## 2023-06-09 PROCEDURE — 99305 1ST NF CARE MODERATE MDM 35: CPT | Performed by: INTERNAL MEDICINE

## 2023-06-09 NOTE — LETTER
6/9/2023        RE: Mejia Ordaz  [address] [facility] [address]        [place] [facility]  INITIAL VISIT NOTE  June 9, 2023    PRIMARY CARE PROVIDER AND CLINIC:  Carlos Méndez [address] / [place] [address]    CHIEF COMPLAINT:  Hospital follow-up/Initial visit    HPI:    Mejia Ordaz is a 87 year old  (1935) female who was seen at [facility] on June 9, 2023 for an initial visit.     Medical history is notable for hypertension, dyslipidemia, essential thrombocythemia, anemia, giant cell arteritis, UTI, Bell's palsy, trigeminal neuralgia, atrophic vaginitis, depression, osteoarthritis, and osteoporosis.    Summary of hospital course:  Patient was hospitalized at [facility] from Grisel 3 through June 6, 2023 for nondisplaced right L1 transverse process fracture as well as T12 burst fracture with greater than 75% vertebral body height loss sustained after mechanical fall.  Notably, she had been recently seen at [facility] for weakness and low back pain and imaging study had shown an indeterminate mild T12 compression fracture.  She was evaluated by spine surgery and no surgical intervention was indicated.  TLSO brace was ordered.  TCU was recommended per therapies.    Patient is admitted to this facility for medical management, nursing care, and rehab.     Of note, history was obtained from patient, facility RN, and extensive review of the chart.    Today's visit:  Patient was seen in her room, while lying in bed.  She appears frail but comfortable.  She reports no significant back pain at rest but she does rate her back pain at 7- 8 out of 10 with ambulation.  She had a bowel movement yesterday.  She denies fever, chills, chest pain, palpitation, dyspnea, nausea, vomiting, abdominal pain, or urinary symptoms.      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, per CT cervical spine on Grisel 3, 2023  UTI  Left Bell's palsy  Trigeminal neuralgia  Atrophic vaginitis  Depression  Osteoarthritis  DDD of lumbar spine  Osteoporosis      Past Medical History:   Diagnosis Date     Anemia      Arteritis (H)     CHRONIC     Arthritis      Atrophic vaginitis 11/13/2012     Bell's palsy     rt facial pain controlled by Tegretal     Chronic steroid use 08/19/2015     Chronic UTI 11/13/2012     CKD (chronic kidney disease) stage 2, GFR 60-89 ml/min 06/02/2012     Depression      Headache(784.0)      Hyperlipidemia LDL goal <100 06/02/2012     Hypertension goal BP (blood pressure) < 140/90 09/27/2011     Microalbuminuria 11/13/2012     Mixed hyperlipidemia 11/13/2012     Need for prophylactic hormone replacement therapy (postmenopausal)      Osteoporosis, unspecified 02/2003    on HRT, calcium and Vit.     Other and unspecified hyperlipidemia      PONV (postoperative nausea and vomiting) 08/19/2015     Thrombocytopenia (H)      Trigeminal neuralgia        PAST SURGICAL HISTORY:   Past Surgical History:   Procedure Laterality Date     ARTHROPLASTY HIP ANTERIOR Right 11/28/2022    Procedure: RIGHT DIRECT ANTERIOR TOTAL HIP  ARTHROPLASTY;  Surgeon: Carter Diaz MD;  Location:  OR     BALLOON COMPRESSION RHIZOTOMY Right 8/20/2015    Procedure: BALLOON COMPRESSION RHIZOTOMY;  Surgeon: Luis Daniel Marino MD;  Location: [facility] OR     COLONOSCOPY  2007     EXCISE LESION HEAD Left 8/9/2017    Procedure: EXCISE LESION HEAD;  EXCISION OF PREAURICULAR CYST LEFT EAR;  Surgeon: Yordan Starkey MD;  Location: [facility] ESOPHAGOSCOPY, DIAGNOSTIC  2009    gastritis     [facility] NONSPECIFIC PROCEDURE  1970    hysterectomy BSO     Z NONSPECIFIC PROCEDURE  1999    flex        FAMILY HISTORY:   Family History   Problem Relation Age of Onset     Cerebrovascular Disease Father        SOCIAL HISTORY:  Social History     Tobacco Use     Smoking status: Never     Smokeless tobacco: Never   Vaping Use     Vaping status: Not on file   Substance Use Topics     Alcohol use: No     Alcohol/week: 0.0 standard drinks of alcohol       MEDICATIONS:  Current Outpatient Medications   Medication Sig Dispense Refill     acetaminophen (TYLENOL) 500 MG tablet Take 1,000 mg by mouth 3 times daily       albuterol (PROAIR HFA/PROVENTIL HFA/VENTOLIN HFA) 108 (90 Base) MCG/ACT inhaler Inhale 1-2 puffs into the lungs 4 times daily as needed for shortness of breath, wheezing or cough       amLODIPine (NORVASC) 5 MG tablet Take 5 mg by mouth daily       aspirin 81 MG EC tablet Take 1 tablet (81 mg) by mouth daily Restart once daily (81 mg) after completing course of 325 mg daily prescribed by Ortho.       atorvastatin (LIPITOR) 10 MG tablet Take 1 tablet (10 mg) by mouth daily 90 tablet 0     calcitonin, salmon, (MIACALCIN) 200 UNIT/ACT nasal spray 1 spray Alternating nostrils one time a day for Acute midline thoracic back pain for 23 Days       celecoxib (CELEBREX) 100 MG capsule Take 100 mg by mouth 2 times daily       gabapentin (NEURONTIN) 100 MG capsule Take 200 mg by mouth At Bedtime       hydroxyurea (HYDREA) 500 MG capsule Take by mouth daily       hydrOXYzine (VISTARIL) 25 MG capsule Take 25 mg by mouth 3 times daily       Lidocaine (LIDOCARE) 4 % Patch Place 1 patch onto the skin every 24 hours To prevent lidocaine toxicity, patient should be patch free for 12 hrs daily. Apply to back       meclizine (ANTIVERT) 25 MG tablet Take 25 mg by mouth 2 times daily as needed for dizziness       melatonin 3 MG tablet Take 6 mg by mouth At Bedtime       oxyCODONE (ROXICODONE) 5 MG tablet Take 5 mg by mouth every 4 hours as needed for severe pain       polyethylene glycol (MIRALAX) 17 g packet Take 17 g by  "mouth daily as needed for constipation       sennosides (SENOKOT) 8.6 MG tablet Take 2 tablets by mouth 2 times daily       vitamin D3 (CHOLECALCIFEROL) 50 mcg (2000 units) tablet Take 1 tablet by mouth daily         MED REC REQUIRED  Post Medication Reconciliation Status: medication reconcilation previously completed during another office visit         ALLERGIES:  Allergies   Allergen Reactions     Hydrochlorothiazide      Other reaction(s): Hyponatremia     Penicillins Itching     Scopolamine Nausea     Nausea for two weeks after patch was removed.       ROS:  10 point ROS were negative other than the symptoms noted above in the HPI.    PHYSICAL EXAM:  Vital signs were reviewed in the chart.  Vital Signs: /68   Pulse 82   Temp 98.2  F (36.8  C)   Resp 16   Ht 1.575 m (5' 2\")   Wt 44.5 kg (98 lb 3.2 oz)   SpO2 98%   BMI 17.96 kg/m    General: Frail appearing but comfortable and in no acute distress  HEENT: No conjunctival pallor, no scleral icterus or injection, moist oral mucosa  Cardiovascular: Normal S1, S2, RRR  Respiratory: Lungs clear to auscultation bilaterally  GI: Abdomen soft, non-tender, non-distended, +BS  Extremities: No LE edema  Neuro: Normal speech, left facial droop, no focal weakness on gross examination  Psych: Alert and oriented x3; normal affect  Skin: No acute rash    LABORATORY/IMAGING DATA:  All relevant labs and imaging data in [facility] and/or Care Everywhere were personally reviewed today.      Most Recent 3 CBC's:Recent Labs   Lab Test 06/08/23  0639 01/27/23  1318 01/05/23  1701   WBC 8.9 7.8 9.4   HGB 12.1 12.6 11.8   * 92 94    602* 803*     Most Recent 3 BMP's:Recent Labs   Lab Test 06/08/23  0639 01/27/23  1318 01/05/23  1701    132* 138   POTASSIUM 3.5 3.9 4.0   CHLORIDE 104 95* 101   CO2 25 28 30   BUN 24.9* 18.7 24   CR 0.81 0.82 0.78   ANIONGAP 7 9 7   LEIGH 8.2* 9.3 9.1   GLC 99 112* 94         ASSESSMENT/PLAN:  Mechanical fall, subsequent " encounter,  T12 burst fracture, subsequent encounter,  Nondisplaced right L1 transverse process fracture, subsequent encounter,  Age-related osteoporosis with current pathologic fractures,  Physical deconditioning  Patient is hemodynamically stable.  Moderate pain with ambulation.  Plan:  Fall precautions  Discontinue Medrol Dosepak (methylprednisolone)  Continue pain management with acetaminophen 1000 mg 3 times daily, celecoxib 100 mg twice daily, hydroxyzine 25 mg 3 times daily, lidocaine patch, oxycodone 5 mg every 4 hours PRN, and gabapentin 200 mg at bedtime  Continue calcitonin nasal spray 200 units, 1 spray alternating nostrils daily through June 30, 2023  Continue TLSO brace when out of bed  Continue PT/OT evaluation and therapy  Follow-up with spine surgery as directed    Essential hypertension.  Previously on lisinopril; recently discontinued due to cough.  Blood pressure is now fairly controlled.  Plan:  Continue amlodipine 5 mg daily  Monitor blood pressure    Dyslipidemia.  Plan:  Continue atorvastatin 10 mg daily    Essential thrombocythemia.  Last platelet count 436,000 on June 8.  Plan:  Continue hydroxyurea 500 mg daily  Monitor platelets periodically  Follow-up with hematology as directed    Chronic mild anemia.  Baseline Hgb around 12.  Last CBC on June 8 with hemoglobin 12.1 and .  Plan:  Monitor hemoglobin periodically    Aneurysmal dilation of the aortic arch.  Measuring 3.3 cm, per CT cervical spine on Grisel 3, 2023.  Plan:  Follow-up as outpatient    Slow transit constipation.  Plan:  Continue the bowel regimen        Orders written by provider at facility:  Discontinue Medrol Dosepak (methylprednisolone)        Disclaimer: This note may contain text created using speech-recognition software and may contain unintended word substitutions.      Electronically signed by:  Misti Gonsalez MD                          Sincerely,        Misti Gonsalez MD

## 2023-06-12 VITALS
BODY MASS INDEX: 18 KG/M2 | HEART RATE: 76 BPM | WEIGHT: 97.8 LBS | DIASTOLIC BLOOD PRESSURE: 60 MMHG | OXYGEN SATURATION: 97 % | SYSTOLIC BLOOD PRESSURE: 106 MMHG | TEMPERATURE: 97.2 F | HEIGHT: 62 IN | RESPIRATION RATE: 18 BRPM

## 2023-06-13 ENCOUNTER — TRANSITIONAL CARE UNIT VISIT (OUTPATIENT)
Dept: GERIATRICS | Facility: CLINIC | Age: 88
End: 2023-06-13
Payer: COMMERCIAL

## 2023-06-13 DIAGNOSIS — Z96.641 STATUS POST TOTAL REPLACEMENT OF RIGHT HIP: ICD-10-CM

## 2023-06-13 DIAGNOSIS — N18.2 CKD (CHRONIC KIDNEY DISEASE) STAGE 2, GFR 60-89 ML/MIN: ICD-10-CM

## 2023-06-13 DIAGNOSIS — E87.1 HYPONATREMIA: ICD-10-CM

## 2023-06-13 DIAGNOSIS — K59.01 SLOW TRANSIT CONSTIPATION: ICD-10-CM

## 2023-06-13 DIAGNOSIS — I10 HYPERTENSION GOAL BP (BLOOD PRESSURE) < 140/90: Primary | ICD-10-CM

## 2023-06-13 PROCEDURE — 99309 SBSQ NF CARE MODERATE MDM 30: CPT | Performed by: NURSE PRACTITIONER

## 2023-06-13 NOTE — PROGRESS NOTES
"[facility] GERIATRICS    Chief Complaint   Patient presents with     RECHECK     HPI:  Mejia Ordaz is a 87 year old  (1935), who is being seen today for an episodic care visit at: [facility] ([facility]) [[id number]].     This is an 87 y.o. female with a PMHx of osteoarthritis, HTN, hypercholesterolemia, thrombocytopenia, R MARGI, and giant cell arteritis who sustained a fall and presented to the hospital with back pain. She was found to have a mild T12 compression fracture, was treated with a TLSO brace, and discharged home with home health care where she unfortunately sustained another fall that resulted in severe back pain that shot down her legs and caused episodic numbness. She again presented to the hospital with repeat imaging showing a severe burst fracture of T12 with greater than 75% vertebral body height loss. She was transferred to another facility for an orthopedic spine consult and possible surgery, however conservative management with TLSO brace and pain control was again recommended. She was discharged to [facility] for rehab.     Today's concern is:  Pain control, therapy progress    Allergies, and PMH/PSH reviewed in [facility] today.  REVIEW OF SYSTEMS:  4 point ROS including Respiratory, CV, GI and , other than that noted in the HPI,  is negative    Objective:   /60   Pulse 76   Temp 97.2  F (36.2  C)   Resp 18   Ht 1.575 m (5' 2\")   Wt 44.4 kg (97 lb 12.8 oz)   SpO2 97%   BMI 17.89 kg/m    GENERAL APPEARANCE:  Alert, in no distress, thin, back pain  RESP:  lungs clear to auscultation , no respiratory distress, RA  CV:  regular rate and rhythm, no murmur, rub, or gallop  PSYCH:  oriented X 3, SLUMS 19/30      Most Recent 3 CBC's:  Recent Labs   Lab Test 06/08/23  0639 01/27/23  1318 01/05/23  1701   WBC 8.9 7.8 9.4   HGB 12.1 12.6 11.8   * 92 94    602* 803*     Most Recent 3 BMP's:  Recent Labs   Lab Test 06/08/23  0639 01/27/23  1318 01/05/23  1701    132* 138 "   POTASSIUM 3.5 3.9 4.0   CHLORIDE 104 95* 101   CO2 25 28 30   BUN 24.9* 18.7 24   CR 0.81 0.82 0.78   ANIONGAP 7 9 7   LEIGH 8.2* 9.3 9.1   GLC 99 112* 94       Assessment/Plan:    T12 Burst Fracture  S/P R TKA in 11/2022  Given conservative management, TLSO brace on when >30  in bed, sitting, and standing. Up as tolerated with no bending, twisting, or lifting >5lbs.  Continue vitamin D supplementation.   T12 fracture follow up and x-rays on 6/16 at [facility]      Pain  Continue calcitonin nasal spray, lidocaine 4% patch to back daily, tylenol 1000mg TID, celebrex 100mg TID, continue gabapentin 200mg at HS. today and discontinue oxycodone and Vistaril.  Denies pain     HTN  Lisinopril discontinued inpatient due to cough. Continue PTA amlodipine 5mg daily and aspirin 81mg daily.  -130s, HR <70s     Constipation  Continue PTA miraLAX 17g packet daily prn and 2 tabs senna 8.6mg BID, adequate     Renal function  Last CR 0.82 with GFR >60, recheck BMP on 6/8     Mild hyponatremia  Last Na 136 on 6/8     Normocytic anemia  Last Hgb 12.1 on 6/8     Essential thrombocytopenia   Continue PTA hydroxyurea 500mg daily, last  on 6/8     Hypercholesterolemia  Continue PTA atorvastatin 10mg daily     Vertigo  Continue meclizine 25 mg twice daily as needed     Insomnia  Using melatonin 6mg HS    Therapy  Ambulating 250 feet with FWW, SBA    Orders:  Discontinue oxycodone and vistaril    Electronically signed by: Carter Wylie NP

## 2023-06-14 VITALS
RESPIRATION RATE: 18 BRPM | BODY MASS INDEX: 18.29 KG/M2 | HEART RATE: 80 BPM | WEIGHT: 99.4 LBS | DIASTOLIC BLOOD PRESSURE: 65 MMHG | HEIGHT: 62 IN | SYSTOLIC BLOOD PRESSURE: 99 MMHG | TEMPERATURE: 97.6 F | OXYGEN SATURATION: 99 %

## 2023-06-15 ENCOUNTER — DISCHARGE SUMMARY NURSING HOME (OUTPATIENT)
Dept: GERIATRICS | Facility: CLINIC | Age: 88
End: 2023-06-15
Payer: COMMERCIAL

## 2023-06-15 DIAGNOSIS — I10 HYPERTENSION GOAL BP (BLOOD PRESSURE) < 140/90: ICD-10-CM

## 2023-06-15 DIAGNOSIS — K59.01 SLOW TRANSIT CONSTIPATION: ICD-10-CM

## 2023-06-15 DIAGNOSIS — I10 PRIMARY HYPERTENSION: Primary | ICD-10-CM

## 2023-06-15 DIAGNOSIS — E78.5 DYSLIPIDEMIA: ICD-10-CM

## 2023-06-15 DIAGNOSIS — E87.1 HYPONATREMIA: ICD-10-CM

## 2023-06-15 DIAGNOSIS — D47.3 ESSENTIAL THROMBOCYTHEMIA (H): ICD-10-CM

## 2023-06-15 DIAGNOSIS — S22.081A T12 BURST FRACTURE (H): ICD-10-CM

## 2023-06-15 DIAGNOSIS — N18.2 CKD (CHRONIC KIDNEY DISEASE) STAGE 2, GFR 60-89 ML/MIN: ICD-10-CM

## 2023-06-15 PROCEDURE — 99316 NF DSCHRG MGMT 30 MIN+: CPT | Performed by: NURSE PRACTITIONER

## 2023-06-15 RX ORDER — ATORVASTATIN CALCIUM 10 MG/1
10 TABLET, FILM COATED ORAL DAILY
Qty: 30 TABLET | Refills: 0 | Status: SHIPPED | OUTPATIENT
Start: 2023-06-15

## 2023-06-15 RX ORDER — AMLODIPINE BESYLATE 5 MG/1
5 TABLET ORAL DAILY
Qty: 30 TABLET | Refills: 0 | Status: SHIPPED | OUTPATIENT
Start: 2023-06-15

## 2023-06-15 RX ORDER — CELECOXIB 100 MG/1
100 CAPSULE ORAL 2 TIMES DAILY
Qty: 60 CAPSULE | Refills: 0 | Status: SHIPPED | OUTPATIENT
Start: 2023-06-15

## 2023-06-15 RX ORDER — HYDROXYUREA 500 MG/1
500 CAPSULE ORAL DAILY
Qty: 30 CAPSULE | Refills: 0 | Status: SHIPPED | OUTPATIENT
Start: 2023-06-15

## 2023-06-15 RX ORDER — GABAPENTIN 100 MG/1
200 CAPSULE ORAL AT BEDTIME
Qty: 60 CAPSULE | Refills: 0 | Status: SHIPPED | OUTPATIENT
Start: 2023-06-15

## 2023-06-15 NOTE — PROGRESS NOTES
[facility] GERIATRICS DISCHARGE SUMMARY  PATIENT'S NAME: Mejia Ordaz  YOB: 1935  MEDICAL RECORD NUMBER:  [id number]  Place of Service where encounter took place:  [facility] (TCU) [[id number]]    PRIMARY CARE PROVIDER AND CLINIC RESPONSIBLE AFTER TRANSFER:   Carlos Méndez MD, [address] / [place] [address]    [facility] Provider     Transferring providers: Carter Wylie NP, Dr. Wallace MD  Recent Hospitalization/ED:  [facility]  stay 6/3/23 to 6/6/23.  Date of SNF Admission: 6/6/23  Date of SNF (anticipated) Discharge: June 16, 2023  Discharged to: previous independent home  Cognitive Scores: SLUMS: 19/30  Physical Function: Ambulating >250 ft with FWW  DME: No new DME needed    CODE STATUS/ADVANCE DIRECTIVES DISCUSSION:  Prior   ALLERGIES: Hydrochlorothiazide, Penicillins, and Scopolamine    HPI  This is an 87 y.o. female with a PMHx of osteoarthritis, HTN, hypercholesterolemia, thrombocytopenia, R MARGI, and giant cell arteritis who sustained a fall and presented to the hospital with back pain. She was found to have a mild T12 compression fracture, was treated with a TLSO brace, and discharged home with home health care where she unfortunately sustained another fall that resulted in severe back pain that shot down her legs and caused episodic numbness. She again presented to the hospital with repeat imaging showing a severe burst fracture of T12 with greater than 75% vertebral body height loss. She was transferred to another facility for an orthopedic spine consult and possible surgery, however conservative management with TLSO brace and pain control was again recommended. She was discharged to [facility] for rehab.     Summary of nursing facility stay:     T12 Burst Fracture  S/P R TKA in 11/2022  Given conservative management, TLSO brace on when >30  in bed, sitting, and standing. Up as tolerated with no bending, twisting, or lifting >5lbs.  Continue  vitamin D supplementation.   T12 fracture follow up and x-rays on 6/16 at [facility]      Pain  Continue calcitonin nasal spray, lidocaine 4% patch to back daily, tylenol 1000mg TID, celebrex 100mg TID, continue gabapentin 200mg at HS     HTN  Lisinopril discontinued inpatient due to cough. Continue PTA amlodipine 5mg daily and aspirin 81mg daily.  -130s, HR <70s     Constipation  Continue PTA miraLAX 17g packet daily prn and 2 tabs senna 8.6mg BID     Renal function  Last CR 0.81 with GFR >60 on 6/8     Mild hyponatremia  Last Na 136 on 6/8     Normocytic anemia  Last Hgb 12.1 on 6/8     Essential thrombocytopenia   Continue PTA hydroxyurea 500mg daily, last  on 6/8     Hypercholesterolemia  Continue PTA atorvastatin 10mg daily     Vertigo  Continue meclizine 25 mg twice daily as needed     Insomnia  Using melatonin 6mg HS    Discharge Medications:    Current Outpatient Medications   Medication Sig Dispense Refill     acetaminophen (TYLENOL) 500 MG tablet Take 1,000 mg by mouth 3 times daily       albuterol (PROAIR HFA/PROVENTIL HFA/VENTOLIN HFA) 108 (90 Base) MCG/ACT inhaler Inhale 1-2 puffs into the lungs 4 times daily as needed for shortness of breath, wheezing or cough       amLODIPine (NORVASC) 5 MG tablet Take 5 mg by mouth daily       aspirin 81 MG EC tablet Take 1 tablet (81 mg) by mouth daily Restart once daily (81 mg) after completing course of 325 mg daily prescribed by Ortho.       atorvastatin (LIPITOR) 10 MG tablet Take 1 tablet (10 mg) by mouth daily 90 tablet 0     calcitonin, salmon, (MIACALCIN) 200 UNIT/ACT nasal spray 1 spray Alternating nostrils one time a day for Acute midline thoracic back pain for 23 Days       celecoxib (CELEBREX) 100 MG capsule Take 100 mg by mouth 2 times daily       gabapentin (NEURONTIN) 100 MG capsule Take 200 mg by mouth At Bedtime       hydroxyurea (HYDREA) 500 MG capsule Take by mouth daily       Lidocaine (LIDOCARE) 4 % Patch  "Place 1 patch onto the skin every 24 hours To prevent lidocaine toxicity, patient should be patch free for 12 hrs daily. Apply to back       meclizine (ANTIVERT) 25 MG tablet Take 25 mg by mouth 2 times daily as needed for dizziness       melatonin 3 MG tablet Take 6 mg by mouth At Bedtime       polyethylene glycol (MIRALAX) 17 g packet Take 17 g by mouth daily as needed for constipation       sennosides (SENOKOT) 8.6 MG tablet Take 2 tablets by mouth 2 times daily       vitamin D3 (CHOLECALCIFEROL) 50 mcg (2000 units) tablet Take 1 tablet by mouth daily       Controlled medications:   not applicable/none     Past Medical History:   Past Medical History:   Diagnosis Date     Anemia      Arteritis (H)     CHRONIC     Arthritis      Atrophic vaginitis 11/13/2012     Bell's palsy     rt facial pain controlled by Tegretal     Chronic steroid use 08/19/2015     Chronic UTI 11/13/2012     CKD (chronic kidney disease) stage 2, GFR 60-89 ml/min 06/02/2012     Depression      Headache(784.0)      Hyperlipidemia LDL goal <100 06/02/2012     Hypertension goal BP (blood pressure) < 140/90 09/27/2011     Microalbuminuria 11/13/2012     Mixed hyperlipidemia 11/13/2012     Need for prophylactic hormone replacement therapy (postmenopausal)      Osteoporosis, unspecified 02/2003    on HRT, calcium and Vit.     Other and unspecified hyperlipidemia      PONV (postoperative nausea and vomiting) 08/19/2015     Thrombocytopenia (H)      Trigeminal neuralgia      Physical Exam:   Vitals: BP 99/65   Pulse 80   Temp 97.6  F (36.4  C)   Resp 18   Ht 1.575 m (5' 2\")   Wt 45.1 kg (99 lb 6.4 oz)   SpO2 99%   BMI 18.18 kg/m    BMI: Body mass index is 18.18 kg/m .  GENERAL APPEARANCE:  Alert, in no distress, thin, back pain  RESP:  lungs clear to auscultation , no respiratory distress, RA  CV:  regular rate and rhythm, no murmur, rub, or gallop  PSYCH:  oriented X 3, SLUMS 19/30    SNF labs:   Most Recent 3 CBC's:Recent Labs   Lab Test " 06/08/23  0639 01/27/23  1318 01/05/23  1701   WBC 8.9 7.8 9.4   HGB 12.1 12.6 11.8   * 92 94    602* 803*     Most Recent 3 BMP's:Recent Labs   Lab Test 06/08/23  0639 01/27/23  1318 01/05/23  1701    132* 138   POTASSIUM 3.5 3.9 4.0   CHLORIDE 104 95* 101   CO2 25 28 30   BUN 24.9* 18.7 24   CR 0.81 0.82 0.78   ANIONGAP 7 9 7   LEIGH 8.2* 9.3 9.1   GLC 99 112* 94       DISCHARGE PLAN:    Follow up labs: No labs orders/due    Medical Follow Up:      Follow up with primary care provider in 1-2 weeks    Current [place] scheduled appointments:       Discharge Services: Home Care:  Occupational Therapy, Physical Therapy, Registered Nurse and Home Health Aide    Discharge Instructions Verbalized to Patient at Discharge:     None    TOTAL DISCHARGE TIME:   Greater than 30 minutes  Electronically signed by:  Carter Wylie NP     Home care Face to Face documentation done in [facility] attached to Home care orders for [facility].

## 2023-08-20 ENCOUNTER — HEALTH MAINTENANCE LETTER (OUTPATIENT)
Age: 88
End: 2023-08-20

## 2024-10-13 ENCOUNTER — HEALTH MAINTENANCE LETTER (OUTPATIENT)
Age: 89
End: 2024-10-13

## (undated) DEVICE — NDL 27GA 1.25" 305136

## (undated) DEVICE — GLOVE PROTEXIS MICRO 7.5  2D73PM75

## (undated) DEVICE — SOL WATER IRRIG 1000ML BOTTLE 2F7114

## (undated) DEVICE — SOL NACL 0.9% IRRIG 1000ML BOTTLE 07138-09

## (undated) DEVICE — DRAPE SHEET REV FOLD 3/4 9349

## (undated) DEVICE — Device

## (undated) DEVICE — BLADE SAW SAGITTAL STRK 25X79.5X1.24MM 4/2000 2108-318-000

## (undated) DEVICE — LINEN TOWEL PACK X5 5464

## (undated) DEVICE — GLOVE SENSICARE PI ORTHO PF 7.0 LATEX FREE MSG9470

## (undated) DEVICE — BONE CLEANING TIP INTERPULSE  0210-010-000

## (undated) DEVICE — SU VICRYL 0 CT-1 CR 8X18" J740D

## (undated) DEVICE — DRILL BIT BIOM QUICK CONNECTING RING LOCK 3.2X20MM 31-323220

## (undated) DEVICE — SU VICRYL 1 CT-1 27" UND J261H

## (undated) DEVICE — DRAPE CONVERTORS U-DRAPE 60X72" 8476

## (undated) DEVICE — PACK MINOR SBA15MIFSE

## (undated) DEVICE — GLOVE BIOGEL PI MICRO INDICATOR UNDERGLOVE SZ 8.0 48980

## (undated) DEVICE — DRAPE SPLIT EENT 76X124" 3X28" 9447

## (undated) DEVICE — ESU GROUND PAD UNIVERSAL W/O CORD

## (undated) DEVICE — SOL NACL 0.9% IRRIG 1000ML BOTTLE 2F7124

## (undated) DEVICE — GLOVE SENSICARE PI MICRO PF 6.5 LATEX FREE MSG9665

## (undated) DEVICE — GLOVE BIOGEL PI SZ 7.5 40875

## (undated) DEVICE — SPONGE BALL KERLIX ROUND XL W/O STRING LATEX 4935

## (undated) DEVICE — SU PLAIN FAST ABSORB 6-0 PC-1 18" 1916G

## (undated) DEVICE — GLOVE BIOGEL PI MICRO INDICATOR UNDERGLOVE SZ 7.5 48975

## (undated) DEVICE — DRSG AQUACEL AG 3.5X9.75" HYDROFIBER 412011

## (undated) DEVICE — DRAPE STERI TOWEL LG 1010

## (undated) DEVICE — SU ETHIBOND 1 CT-1 30" X425H

## (undated) DEVICE — SU VICRYL 2-0 CT-1 27" UND J259H

## (undated) DEVICE — GOWN IMPERVIOUS SPECIALTY XLG/XLONG 32474

## (undated) DEVICE — SOLUTION WOUND CLEANSING 3/4OZ 10% PVP EA-L3011FB-50

## (undated) DEVICE — MANIFOLD NEPTUNE 4 PORT 700-20

## (undated) DEVICE — SU VICRYL 1 CTX 36" J371H

## (undated) DEVICE — SUCTION IRR SYSTEM W/O TIP INTERPULSE HANDPIECE 0210-100-000

## (undated) DEVICE — DRAPE IOBAN INCISE 23X17" 6650EZ

## (undated) DEVICE — PACK TOTAL HIP W/U DRAPE SOP15HUFSC

## (undated) DEVICE — DRAPE C-ARM 60X42" 1013

## (undated) DEVICE — SU CHROMIC 4-0 P-3 18" 1654G

## (undated) DEVICE — SOL NACL 0.9% INJ 1000ML BAG 2B1324X

## (undated) RX ORDER — DEXAMETHASONE SODIUM PHOSPHATE 4 MG/ML
INJECTION, SOLUTION INTRA-ARTICULAR; INTRALESIONAL; INTRAMUSCULAR; INTRAVENOUS; SOFT TISSUE
Status: DISPENSED
Start: 2017-08-09

## (undated) RX ORDER — APREPITANT 40 MG/1
CAPSULE ORAL
Status: DISPENSED
Start: 2022-11-28

## (undated) RX ORDER — FENTANYL CITRATE 50 UG/ML
INJECTION, SOLUTION INTRAMUSCULAR; INTRAVENOUS
Status: DISPENSED
Start: 2022-11-28

## (undated) RX ORDER — FENTANYL CITRATE 0.05 MG/ML
INJECTION, SOLUTION INTRAMUSCULAR; INTRAVENOUS
Status: DISPENSED
Start: 2022-11-28

## (undated) RX ORDER — VANCOMYCIN HYDROCHLORIDE 1 G/20ML
INJECTION, POWDER, LYOPHILIZED, FOR SOLUTION INTRAVENOUS
Status: DISPENSED
Start: 2022-11-28

## (undated) RX ORDER — ONDANSETRON 2 MG/ML
INJECTION INTRAMUSCULAR; INTRAVENOUS
Status: DISPENSED
Start: 2017-08-09

## (undated) RX ORDER — PROPOFOL 10 MG/ML
INJECTION, EMULSION INTRAVENOUS
Status: DISPENSED
Start: 2017-08-09

## (undated) RX ORDER — FENTANYL CITRATE 50 UG/ML
INJECTION, SOLUTION INTRAMUSCULAR; INTRAVENOUS
Status: DISPENSED
Start: 2017-08-09

## (undated) RX ORDER — TRANEXAMIC ACID 10 MG/ML
INJECTION, SOLUTION INTRAVENOUS
Status: DISPENSED
Start: 2022-11-28

## (undated) RX ORDER — HYDROMORPHONE HCL IN WATER/PF 6 MG/30 ML
PATIENT CONTROLLED ANALGESIA SYRINGE INTRAVENOUS
Status: DISPENSED
Start: 2022-11-28

## (undated) RX ORDER — LIDOCAINE HYDROCHLORIDE 20 MG/ML
INJECTION, SOLUTION EPIDURAL; INFILTRATION; INTRACAUDAL; PERINEURAL
Status: DISPENSED
Start: 2017-08-09